# Patient Record
Sex: MALE | Race: WHITE | NOT HISPANIC OR LATINO | Employment: OTHER | URBAN - METROPOLITAN AREA
[De-identification: names, ages, dates, MRNs, and addresses within clinical notes are randomized per-mention and may not be internally consistent; named-entity substitution may affect disease eponyms.]

---

## 2017-03-17 ENCOUNTER — HOSPITAL ENCOUNTER (OUTPATIENT)
Dept: OTHER | Facility: HOSPITAL | Age: 37
Discharge: HOME | End: 2017-03-17
Attending: SURGERY | Admitting: SURGERY

## 2017-03-17 LAB
ALBUMIN SERPL-MCNC: 3.6 G/DL (ref 3.5–4.8)
ALBUMIN/GLOB SERPL: 0.8 {RATIO} (ref 1–1.7)
ALP SERPL-CCNC: 63 IU/L (ref 32–91)
ALT SERPL-CCNC: 31 IU/L (ref 17–63)
ANION GAP SERPL CALC-SCNC: 15 MMOL/L (ref 10–20)
AST SERPL-CCNC: 26 IU/L (ref 15–41)
BASOPHILS # BLD AUTO: 0.1 10*3/UL (ref 0–0.2)
BASOPHILS NFR BLD AUTO: 1 % (ref 0–2)
BILIRUB SERPL-MCNC: 0.7 MG/DL (ref 0.3–1.2)
BUN SERPL-MCNC: 12 MG/DL (ref 8–20)
BUN/CREAT SERPL: 17.1 (ref 6.2–20.3)
CALCIUM SERPL-MCNC: 8.9 MG/DL (ref 8.9–10.3)
CHLORIDE SERPL-SCNC: 104 MMOL/L (ref 101–111)
CONV CO2: 23 MMOL/L (ref 22–32)
CONV TOTAL PROTEIN: 8 G/DL (ref 6.1–7.9)
CREAT UR-MCNC: 0.7 MG/DL (ref 0.7–1.2)
DIFFERENTIAL METHOD BLD: (no result)
EOSINOPHIL # BLD AUTO: 0.2 10*3/UL (ref 0–0.3)
EOSINOPHIL # BLD AUTO: 3 % (ref 0–3)
ERYTHROCYTE [DISTWIDTH] IN BLOOD BY AUTOMATED COUNT: 14.1 % (ref 11.5–14.5)
GLOBULIN UR ELPH-MCNC: 4.4 G/DL (ref 2.5–3.8)
GLUCOSE SERPL-MCNC: 93 MG/DL (ref 65–99)
HCT VFR BLD AUTO: 43.8 % (ref 40–54)
HGB BLD-MCNC: 14.7 G/DL (ref 14–18)
LYMPHOCYTES # BLD AUTO: 2.6 10*3/UL (ref 0.8–4.8)
LYMPHOCYTES NFR BLD AUTO: 29 % (ref 18–42)
MCH RBC QN AUTO: 29.1 PG (ref 26–32)
MCHC RBC AUTO-ENTMCNC: 33.6 G/DL (ref 32–36)
MCV RBC AUTO: 86.8 FL (ref 80–94)
MONOCYTES # BLD AUTO: 0.5 10*3/UL (ref 0.1–1.3)
MONOCYTES NFR BLD AUTO: 6 % (ref 2–11)
NEUTROPHILS # BLD AUTO: 5.4 10*3/UL (ref 2.3–8.6)
NEUTROPHILS NFR BLD AUTO: 61 % (ref 50–75)
NRBC BLD AUTO-RTO: 0 /100{WBCS}
NRBC/RBC NFR BLD MANUAL: 0 10*3/UL
PLATELET # BLD AUTO: 221 10*3/UL (ref 150–450)
PMV BLD AUTO: 9.5 FL (ref 7.4–10.4)
POTASSIUM SERPL-SCNC: 4 MMOL/L (ref 3.6–5.1)
RBC # BLD AUTO: 5.05 10*6/UL (ref 4.6–6)
SODIUM SERPL-SCNC: 138 MMOL/L (ref 136–144)
WBC # BLD AUTO: 8.8 10*3/UL (ref 4.5–11.5)

## 2017-03-22 ENCOUNTER — HOSPITAL ENCOUNTER (OUTPATIENT)
Dept: PREOP | Facility: HOSPITAL | Age: 37
Setting detail: HOSPITAL OUTPATIENT SURGERY
Discharge: HOME | End: 2017-03-22
Attending: SURGERY | Admitting: SURGERY

## 2017-03-22 LAB
APTT BLD: 24.4 SEC (ref 24–31)
GLUCOSE BLD-MCNC: 100 MG/DL (ref 70–105)
GLUCOSE BLD-MCNC: 213 MG/DL (ref 70–105)
INR PPP: 0.9
PROTHROMBIN TIME: 10.8 SEC (ref 9.6–11.7)

## 2021-04-02 ENCOUNTER — TELEPHONE (OUTPATIENT)
Dept: SURGERY | Facility: CLINIC | Age: 41
End: 2021-04-02

## 2021-04-02 NOTE — TELEPHONE ENCOUNTER
Spoke with pt to see if he could bring disc of last imaging and medical records to us on Tuesday April 6,2021 for his appt.  Pt agreed to  all records needed.  I called [facility] to request those images.

## 2023-07-17 PROBLEM — D68.51 FACTOR V LEIDEN MUTATION: Status: ACTIVE | Noted: 2017-08-31

## 2023-07-17 PROBLEM — I73.9 PERIPHERAL VASCULAR DISEASE: Status: ACTIVE | Noted: 2019-07-27

## 2023-07-17 PROBLEM — J44.9 CHRONIC OBSTRUCTIVE PULMONARY DISEASE: Status: ACTIVE | Noted: 2023-07-17

## 2023-07-17 PROBLEM — E11.9 TYPE 2 DIABETES MELLITUS: Status: ACTIVE | Noted: 2019-07-27

## 2023-07-17 PROBLEM — I63.9 CEREBROVASCULAR ACCIDENT: Status: ACTIVE | Noted: 2023-07-17

## 2023-07-17 PROBLEM — G40.909 SEIZURE DISORDER: Status: ACTIVE | Noted: 2023-07-17

## 2023-07-18 PROBLEM — E66.01 MORBID OBESITY WITH BMI OF 60.0-69.9, ADULT: Status: ACTIVE | Noted: 2023-07-18

## 2023-07-18 PROBLEM — I82.409 DEEP VEIN THROMBOSIS (DVT): Status: ACTIVE | Noted: 2023-07-18

## 2023-07-18 PROBLEM — D64.9 ANEMIA: Status: ACTIVE | Noted: 2021-04-22

## 2023-07-18 PROBLEM — F41.9 ANXIETY AND DEPRESSION: Status: ACTIVE | Noted: 2023-07-18

## 2023-07-18 PROBLEM — R29.818 SUSPECTED SLEEP APNEA: Status: ACTIVE | Noted: 2023-07-18

## 2023-07-18 PROBLEM — K21.9 GERD (GASTROESOPHAGEAL REFLUX DISEASE): Status: ACTIVE | Noted: 2023-07-18

## 2023-07-18 PROBLEM — Z76.89 ENCOUNTER TO ESTABLISH CARE: Status: ACTIVE | Noted: 2023-07-18

## 2023-07-18 PROBLEM — G89.29 CHRONIC BACK PAIN: Status: ACTIVE | Noted: 2023-07-18

## 2023-07-18 PROBLEM — F32.A ANXIETY AND DEPRESSION: Status: ACTIVE | Noted: 2023-07-18

## 2023-07-18 PROBLEM — N18.2 CKD (CHRONIC KIDNEY DISEASE) STAGE 2, GFR 60-89 ML/MIN: Status: ACTIVE | Noted: 2021-10-07

## 2023-07-18 PROBLEM — G40.909 SEIZURE DISORDER: Status: RESOLVED | Noted: 2023-07-17 | Resolved: 2023-07-18

## 2023-07-18 PROBLEM — M54.9 CHRONIC BACK PAIN: Status: ACTIVE | Noted: 2023-07-18

## 2023-09-14 ENCOUNTER — TELEPHONE (OUTPATIENT)
Dept: FAMILY MEDICINE CLINIC | Facility: CLINIC | Age: 43
End: 2023-09-14

## 2023-09-14 NOTE — TELEPHONE ENCOUNTER
Caller: Farhat Hein    Relationship: Self    Best call back number: [phone]     What orders are you requesting (i.e. lab or imaging): SLEEP STUDY     In what timeframe would the patient need to come in: ASAP    Where will you receive your lab/imaging services: [facility]    Additional notes: PLEASE ADVISE

## 2023-09-21 ENCOUNTER — OFFICE VISIT (OUTPATIENT)
Dept: SLEEP MEDICINE | Facility: CLINIC | Age: 43
End: 2023-09-21
Payer: MEDICARE

## 2023-09-21 VITALS
SYSTOLIC BLOOD PRESSURE: 150 MMHG | HEIGHT: 71 IN | RESPIRATION RATE: 14 BRPM | WEIGHT: 315 LBS | HEART RATE: 75 BPM | OXYGEN SATURATION: 95 % | BODY MASS INDEX: 44.1 KG/M2 | DIASTOLIC BLOOD PRESSURE: 86 MMHG

## 2023-09-21 DIAGNOSIS — J44.9 CHRONIC OBSTRUCTIVE PULMONARY DISEASE, UNSPECIFIED COPD TYPE: ICD-10-CM

## 2023-09-21 DIAGNOSIS — E66.01 MORBID (SEVERE) OBESITY DUE TO EXCESS CALORIES: ICD-10-CM

## 2023-09-21 DIAGNOSIS — Z86.73 HISTORY OF STROKE: ICD-10-CM

## 2023-09-21 DIAGNOSIS — R06.83 SNORING: ICD-10-CM

## 2023-09-21 DIAGNOSIS — G47.10 HYPERSOMNOLENCE: Primary | ICD-10-CM

## 2023-09-21 PROCEDURE — 1160F RVW MEDS BY RX/DR IN RCRD: CPT | Performed by: NURSE PRACTITIONER

## 2023-09-21 PROCEDURE — G0463 HOSPITAL OUTPT CLINIC VISIT: HCPCS

## 2023-09-21 PROCEDURE — 99204 OFFICE O/P NEW MOD 45 MIN: CPT | Performed by: NURSE PRACTITIONER

## 2023-09-21 PROCEDURE — 1159F MED LIST DOCD IN RCRD: CPT | Performed by: NURSE PRACTITIONER

## 2023-09-21 PROCEDURE — 3077F SYST BP >= 140 MM HG: CPT | Performed by: NURSE PRACTITIONER

## 2023-09-21 PROCEDURE — 3079F DIAST BP 80-89 MM HG: CPT | Performed by: NURSE PRACTITIONER

## 2023-09-21 NOTE — PROGRESS NOTES
Expand All Collapse All      [address] [address]  Phone   Fax         Farhat Hein  [phone]   1980  43 y.o.  male        PCP:Mis Boggs APRN     Type of service: Initial New Patient Office Visit  Date of service: 9/21/2023              CHIEF COMPLAINT: Suspected sleep apnea        HISTORY OF PRESENT ILLNESS:  Farhat Hein 43 y.o.  presents to the clinic today as a new patient to me and was seen for sleep-related problems of snoring, non-restorative sleep, and witnessed apneas. The symptoms are chronic and persistent in nature.  Patient has no history of tonsillectomy, adenoidectomy, nasal surgery, UPPP.  He is going to be having gastric bypass surgery but has to be treated for sleep apnea 6 weeks prior to this.           PATIENT HISTORY:  Sleep schedule:  Bedtime: 11 PM to midnight  Wake time: 7 to 8 AM  Normally takes 1 to 2 hours to fall asleep  Average hours of sleep: 4-5  Number of naps per day: 2-3     Symptoms:  In addition to the above, patient reports:   Have you ever awakened gasping for breath, coughing, choking: Yes   Change in weight:  Yes, gained 150 pounds  Morning headaches:  Yes   Awaken with a sore throat or dry mouth:  Yes   Leg jerking at night:  Yes   Creepy crawly feeling in legs/urge to move legs:    Teeth grinding: Yes   Have you ever awakened at night with a sour taste or burning sensation in your chest:  No   Do you have muscle weakness with laughing or anger:  No   Have you ever felt paralyzed while going to sleep or waking up:  No   Sleepwalking: No   Nightmares: No   Nocturia (urination at night): 4-5 times per night  Memory Problem: No      Past medical history: (Relevant to sleep medicine)  Hypertension  COPD  Factor V Leiden  History of stroke  Peripheral neuropathy        Social history:  Do you drive a commercial vehicle:  No   Shift work:  No   Tobacco use:  No  Alcohol use:  0 per  "week  Caffeinated drinks: 2 per day  Occupation:         Family history (parents, siblings, children) (relevant to sleep medicine):  Heart disease  Hypertension  Thyroid disorder  COPD  Asthma  Diabetes  Obesity  Stroke  Sleep apnea  Cancer     Medications: reviewed      ALLERGIES: Promethazine, Calamine-zinc oxide, and Vancomycin           REVIEW OF SYSTEMS:  Full review of systems available on the intake form which is scanned in the media tab.  The relevant positives are noted below:  [place] Sleepiness Scale:   16  Fatigue  Snoring  Frequent urination  Chronic dyspnea  Heartburn        PHYSICAL EXAM:  Vitals:    09/21/23 0900   BP: 150/86   BP Location: Right arm   Patient Position: Sitting   Pulse: 75   Resp: 14   SpO2: 95%   Weight: (!) 205 kg (451 lb 12.8 oz)   Height: 180.3 cm (71\")        There is no height or weight on file to calculate BMI.    HEAD: atraumatic, normocephalic  RESPIRATORY SYSTEM: Respirations even, unlabored, normal rate  CARDIOVASULAR SYSTEM: Normal rate  EXTREMITES: No cyanosis or clubbing  NEUROLOGICAL SYSTEM: Alert and oriented x 3, no gross motor defects, gait normal     Office notes from care team reviewed. Office note(s) dated 7/18/2023, reviewed.        Labs/ Test Results Reviewed:  TSH Results:         Most Recent A1C Result:   Most Recent A1C            7/18/2023    14:03   HGBA1C Most Recent   Hemoglobin A1C 5.90                      ASSESSMENT AND PLAN:   Witnessed apnea (R06.81): patient's symptoms and physical examination are concerning for possible sleep apnea (G47.30).   I discussed the signs, symptoms, and pathophysiology of sleep apnea with this patient.  I also discussed the potential complications of untreated sleep apnea including but not limited to resistant hypertension, insulin resistance, pulmonary hypertension, atrial fibrillation, stroke, nonrestorative sleep with hypersomnia which can increase risk for motor vehicle accidents, etc.   Different testing " methods including home-based and lab based sleep studies were discussed with this patient.   Based on patient history and physical examination, will proceed with in-lab polysomnogram.  The order for the sleep study is placed in [facility].  The test will be scheduled after prior authorization has been obtained through patient's insurance.  Treatment and management will be discussed in more detail with this patient after the test is completed.  All questions were answered to patient's satisfaction.  Did discuss possible one-time low-dose Ambien night of in lab sleep study only to help aid in sleep efficiency as patient does have a lot of trouble sleeping at night.  Discussed potential risks including potential risk of fall.  Patient does not have a history of falls.  He would not take with alcohol or other sedative medications.  He would not take for home study, only in lab setting which is monitored.  He would not take before arriving or drive or operate heavy machinery on medication.  Snoring (R06.83): snoring is the sound created by turbulent airflow vibrating upper airway soft tissue.  I have also discussed factors affecting snoring including sleep deprivation, sleeping on the back and alcohol ingestion. To minimize snoring, patient is advised to have adequate sleep, sleep on their side, and avoid alcohol and sedative medications around bedtime.   Excessive daytime sleepiness: [place] Sleepiness Scale of  16.  There are many causes of excessive daytime sleepiness including but not limited to sleep apnea, shiftwork syndrome, depression, and other medical disorders such as heart disease, kidney disease, and liver failure.  The most serious cause of excessive sleepiness is due to neurological conditions such as narcolepsy/cataplexy.  More commonly excessive sleepiness is caused by sleep apnea with frequent awakenings during sleep time.  I have discussed safety of driving and to remain vigilant while driving.  Obesity: BMI  63.  Patients who are overweight or obese are at increased risk of sleep apnea/ sleep disordered breathing. Weight reduction and healthy lifestyle are encouraged in overweight/ obese patients as part of a comprehensive approach to sleep apnea treatment.    Hypertension  History of RLS  Hyperlipidemia  Factor V  History of CVA  Type 2 diabetes  CKD  Chronic back pain     I have also discussed the following:  Sleep hygiene: try to maintain a regular bed time and wake time, avoid watching TV/ using electronic devices in bed (including cell phones), limit caffeinated and alcoholic beverages before bed, try to maintain a cool and quiet sleep environment, avoid daytime naps  Adequate amount of sleep: most people need around 7 to 8 hours of sleep each night           Patient will follow-up after study, 31 to 90 days after PAP therapy initiated if applicable, or sooner for issues or concerns.  Patient's questions were answered.           Thank you for allowing me to participate in the care of this patient.     Toshia Kline DNP, APRN  [facility]

## 2023-11-03 ENCOUNTER — OFFICE VISIT (OUTPATIENT)
Dept: FAMILY MEDICINE CLINIC | Facility: CLINIC | Age: 43
End: 2023-11-03
Payer: MEDICARE

## 2023-11-03 VITALS
HEART RATE: 84 BPM | SYSTOLIC BLOOD PRESSURE: 162 MMHG | HEIGHT: 71 IN | BODY MASS INDEX: 44.1 KG/M2 | OXYGEN SATURATION: 96 % | DIASTOLIC BLOOD PRESSURE: 97 MMHG | TEMPERATURE: 99.1 F | WEIGHT: 315 LBS

## 2023-11-03 DIAGNOSIS — J02.9 SORE THROAT: ICD-10-CM

## 2023-11-03 DIAGNOSIS — R05.9 COUGH, UNSPECIFIED TYPE: ICD-10-CM

## 2023-11-03 DIAGNOSIS — R51.9 NONINTRACTABLE HEADACHE, UNSPECIFIED CHRONICITY PATTERN, UNSPECIFIED HEADACHE TYPE: Primary | ICD-10-CM

## 2023-11-03 DIAGNOSIS — S81.801A WOUND OF RIGHT LOWER EXTREMITY, INITIAL ENCOUNTER: ICD-10-CM

## 2023-11-03 DIAGNOSIS — R52 GENERALIZED BODY ACHES: ICD-10-CM

## 2023-11-03 DIAGNOSIS — E66.01 MORBID OBESITY WITH BMI OF 60.0-69.9, ADULT: ICD-10-CM

## 2023-11-03 DIAGNOSIS — R09.89 CHEST CONGESTION: ICD-10-CM

## 2023-11-03 PROBLEM — Z76.89 ENCOUNTER TO ESTABLISH CARE: Status: RESOLVED | Noted: 2023-07-18 | Resolved: 2023-11-03

## 2023-11-03 LAB
EXPIRATION DATE: NORMAL
EXPIRATION DATE: NORMAL
FLUAV AG UPPER RESP QL IA.RAPID: NOT DETECTED
FLUBV AG UPPER RESP QL IA.RAPID: NOT DETECTED
INTERNAL CONTROL: NORMAL
INTERNAL CONTROL: NORMAL
Lab: NORMAL
Lab: NORMAL
S PYO AG THROAT QL: NEGATIVE
SARS-COV-2 AG UPPER RESP QL IA.RAPID: NOT DETECTED

## 2023-11-03 RX ORDER — TORSEMIDE 100 MG/1
100 TABLET ORAL DAILY
Qty: 90 TABLET | Refills: 3 | Status: SHIPPED | OUTPATIENT
Start: 2023-11-03

## 2023-11-03 RX ORDER — AZITHROMYCIN 250 MG/1
TABLET, FILM COATED ORAL
Qty: 6 TABLET | Refills: 0 | Status: SHIPPED | OUTPATIENT
Start: 2023-11-03

## 2023-11-03 RX ORDER — PREDNISONE 10 MG/1
TABLET ORAL
Qty: 48 TABLET | Refills: 0 | Status: SHIPPED | OUTPATIENT
Start: 2023-11-03

## 2023-11-03 RX ORDER — POTASSIUM CHLORIDE 750 MG/1
10 CAPSULE, EXTENDED RELEASE ORAL DAILY
Qty: 90 CAPSULE | Refills: 3 | Status: SHIPPED | OUTPATIENT
Start: 2023-11-03

## 2023-11-03 RX ORDER — BENZONATATE 100 MG/1
100 CAPSULE ORAL 3 TIMES DAILY PRN
Qty: 30 CAPSULE | Refills: 0 | Status: SHIPPED | OUTPATIENT
Start: 2023-11-03

## 2023-11-03 NOTE — PATIENT INSTRUCTIONS
Prescribed predisone, Z-pack, and tesslon perles   Encourage to stay hydrated   Clean your eyes out

## 2023-11-03 NOTE — ASSESSMENT & PLAN NOTE
Poor circulation of RLE, two small wounds noted with serous drainage    Wound dressing changed in office- good erythema noted, small serous drainage    Patient has follow-up with wound care center

## 2023-11-03 NOTE — PROGRESS NOTES
"Chief Complaint  Chief Complaint   Patient presents with    Headache     SOS - yesterday 11-3-23; possible exposure at work     Cough    Vomiting    Generalized Body Aches    Sore Throat        Subjective          Farhat Hein is a 43-year-old male who presents to the office today with complaints of congestion.    Congestion- Started yesterday where he started to feel bad. He explains he vomited a couple of times. He has felt like he has been \"hit by a irwin truck.\" He has had fever, chills, body aches, cough non sputum productions. Sickness is going around at work. He has two employees that have COVID. He has had COVID before. He has not received his flu vaccine this year yet. He has taken some mucinex to try to help.          Review of Systems   Constitutional:  Positive for chills and fatigue.   HENT:  Positive for congestion and sore throat. Negative for ear pain.    Respiratory:  Positive for cough. Negative for shortness of breath.    Cardiovascular:  Negative for chest pain.   Gastrointestinal:  Positive for nausea and vomiting. Negative for abdominal pain.   Genitourinary:  Negative for difficulty urinating.   Musculoskeletal:  Positive for myalgias.   Skin: Negative.    Allergic/Immunologic: Positive for environmental allergies.   Neurological:  Positive for dizziness and headache. Negative for light-headedness.        Objective   Vital Signs:   Vitals:    11/03/23 1415   BP: 162/97   Pulse: 84   Temp: 99.1 °F (37.3 °C)   SpO2: 96%      Estimated body mass index is 63.04 kg/m² as calculated from the following:    Height as of this encounter: 180.3 cm (70.98\").    Weight as of this encounter: 205 kg (451 lb 12.8 oz).                  Patient screened positive for depression based on a PHQ-9 score of 11 on 7/18/2023. Follow-up recommendations include: PCP managing depression.        Physical Exam  Vitals reviewed.   Constitutional:       Appearance: He is obese. He is ill-appearing.   HENT:      Head: " Normocephalic and atraumatic.      Nose: Nose normal. Congestion present.      Mouth/Throat:      Mouth: Mucous membranes are moist.      Pharynx: Oropharynx is clear.   Eyes:      Extraocular Movements: Extraocular movements intact.      Conjunctiva/sclera: Conjunctivae normal.      Pupils: Pupils are equal, round, and reactive to light.   Cardiovascular:      Rate and Rhythm: Normal rate and regular rhythm.      Pulses: Normal pulses.      Heart sounds: Normal heart sounds.      Comments: S1, S2 audible  Pulmonary:      Effort: Pulmonary effort is normal.      Breath sounds: Normal breath sounds.      Comments: On room air - diminished breath sounds at the bases   Abdominal:      General: Abdomen is flat. Bowel sounds are normal.      Palpations: Abdomen is soft.   Musculoskeletal:         General: Normal range of motion.      Cervical back: Normal range of motion.   Skin:     General: Skin is warm and dry.      Comments: RLE wound noted, erythema noted with serous drainage noted, two small areas noted   Neurological:      General: No focal deficit present.      Mental Status: He is alert and oriented to person, place, and time. Mental status is at baseline.   Psychiatric:         Mood and Affect: Mood normal.         Behavior: Behavior normal.         Thought Content: Thought content normal.         Judgment: Judgment normal.                Physical Exam   Result Review :             Procedures       Assessment and Plan     Diagnoses and all orders for this visit:    1. Nonintractable headache, unspecified chronicity pattern, unspecified headache type (Primary)  -     POCT SARS-CoV-2 + Flu Antigen IGOR    2. Generalized body aches  -     POCT SARS-CoV-2 + Flu Antigen IGOR    3. Cough, unspecified type  -     POCT SARS-CoV-2 + Flu Antigen IGOR    4. Sore throat  -     POC Rapid Strep A    5. Chest congestion  Assessment & Plan:  Started yesterday where he started to feel bad. He explains he vomited a couple of times.  "He has felt like he has been \"hit by a mack truck.\" He has had fever, chills, body aches, cough non sputum productions. Sickness is going around at work. He has two employees that have COVID. He has had COVID before. He has not received his flu vaccine this year yet. He has taken some mucinex to try to help.     COVID/Flu negative     Prescribed predisone, Z-pack, and tesslon perles   Encourage to stay hydrated   Clean your eyes out       6. Wound of right lower extremity, initial encounter  Assessment & Plan:  Poor circulation of RLE, two small wounds noted with serous drainage    Wound dressing changed in office- good erythema noted, small serous drainage    Patient has follow-up with wound care center       7. Morbid obesity with BMI of 60.0-69.9, adult  Assessment & Plan:  Patient's (Body mass index is 63.04 kg/m².)     Referral to Bariatric surgery/nutritionist    Patient has been evaluated for bariatric surgery in the past, however his sleep apnea machine got recalled and his stars have not aligned to do so, he's been watching what he eats     Orders:  -     Ambulatory Referral to Bariatric Surgery    Other orders  -     predniSONE (DELTASONE) 10 MG (48) dose pack; Take per package instructions  Dispense: 48 tablet; Refill: 0  -     torsemide (DEMADEX) 100 MG tablet; Take 1 tablet by mouth Daily.  Dispense: 90 tablet; Refill: 3  -     potassium chloride (MICRO-K) 10 MEQ CR capsule; Take 1 capsule by mouth Daily.  Dispense: 90 capsule; Refill: 3  -     azithromycin (Zithromax Z-Vincent) 250 MG tablet; Take 2 tablets by mouth on day 1, then 1 tablet daily on days 2-5  Dispense: 6 tablet; Refill: 0  -     benzonatate (Tessalon Perles) 100 MG capsule; Take 1 capsule by mouth 3 (Three) Times a Day As Needed for Cough.  Dispense: 30 capsule; Refill: 0              Follow Up   Return for Next scheduled follow up.   Patient was given instructions and counseling regarding her condition or for health maintenance advice. " Please see specific information pulled into the AVS if appropriate.

## 2023-11-03 NOTE — ASSESSMENT & PLAN NOTE
Patient's (Body mass index is 63.04 kg/m².)     Referral to Bariatric surgery/nutritionist    Patient has been evaluated for bariatric surgery in the past, however his sleep apnea machine got recalled and his stars have not aligned to do so, he's been watching what he eats

## 2023-11-03 NOTE — LETTER
November 3, 2023     Patient: Farhat Hein   YOB: 1980   Date of Visit: 11/3/2023       To Whom It May Concern:    It is my medical opinion that Farhat Hein may return to November 7th, please excuse patient from work on 11/3, 11/4, and 11/5 due to patient having respiratory illness.         Sincerely,        SAMI Sherman    CC: No Recipients

## 2023-11-03 NOTE — ASSESSMENT & PLAN NOTE
"Started yesterday where he started to feel bad. He explains he vomited a couple of times. He has felt like he has been \"hit by a irwin truck.\" He has had fever, chills, body aches, cough non sputum productions. Sickness is going around at work. He has two employees that have COVID. He has had COVID before. He has not received his flu vaccine this year yet. He has taken some mucinex to try to help.     COVID/Flu negative     Prescribed predisone, Z-pack, and anikasjesus perles   Encourage to stay hydrated   Clean your eyes out   "

## 2023-11-09 ENCOUNTER — HOSPITAL ENCOUNTER (EMERGENCY)
Facility: HOSPITAL | Age: 43
Discharge: HOME | End: 2023-11-10
Attending: EMERGENCY MEDICINE
Payer: MEDICARE

## 2023-11-09 ENCOUNTER — APPOINTMENT (OUTPATIENT)
Dept: GENERAL RADIOLOGY | Facility: HOSPITAL | Age: 43
End: 2023-11-09
Payer: MEDICARE

## 2023-11-09 VITALS
RESPIRATION RATE: 22 BRPM | BODY MASS INDEX: 42.66 KG/M2 | WEIGHT: 315 LBS | HEIGHT: 72 IN | HEART RATE: 76 BPM | DIASTOLIC BLOOD PRESSURE: 87 MMHG | OXYGEN SATURATION: 95 % | TEMPERATURE: 98.3 F | SYSTOLIC BLOOD PRESSURE: 134 MMHG

## 2023-11-09 DIAGNOSIS — J40 BRONCHITIS: Primary | ICD-10-CM

## 2023-11-09 PROCEDURE — 99283 EMERGENCY DEPT VISIT LOW MDM: CPT

## 2023-11-09 PROCEDURE — 71045 X-RAY EXAM CHEST 1 VIEW: CPT

## 2023-11-09 RX ORDER — DOXYCYCLINE 100 MG/1
100 CAPSULE ORAL 2 TIMES DAILY
Qty: 20 CAPSULE | Refills: 0 | Status: SHIPPED | OUTPATIENT
Start: 2023-11-09 | End: 2023-11-19

## 2023-11-09 RX ORDER — ALBUTEROL SULFATE 2.5 MG/3ML
2.5 SOLUTION RESPIRATORY (INHALATION) ONCE
Status: COMPLETED | OUTPATIENT
Start: 2023-11-09 | End: 2023-11-09

## 2023-11-09 RX ORDER — DOXYCYCLINE 100 MG/1
100 CAPSULE ORAL ONCE
Status: COMPLETED | OUTPATIENT
Start: 2023-11-09 | End: 2023-11-09

## 2023-11-09 RX ADMIN — ALBUTEROL SULFATE 2.5 MG: 2.5 SOLUTION RESPIRATORY (INHALATION) at 23:24

## 2023-11-09 RX ADMIN — DOXYCYCLINE 100 MG: 100 CAPSULE ORAL at 23:24

## 2023-11-09 NOTE — Clinical Note
[facility] [place] [id number] [place] [address] [place] [address]  Phone: [phone]    Farhat Hein was seen and treated in our emergency department on 11/9/2023.  He may return to work on 11/13/2023.         Thank you for choosing [facility].    Jj Perez MD

## 2023-11-10 NOTE — FSED PROVIDER NOTE
Subjective   History of Present Illness  Patient 43-year-old man who presents complaining of persistent cough with sputum production.  Symptoms have been ongoing for approximately 1 week.  Patient states at onset of his symptoms have been seen and evaluated the following day by his primary provider.  Strep test and COVID and flu test were all negative.  Patient had presumed pneumonia and was placed on a Z-Vincent with steroids.  Patient finished his medications 1 to 2 days ago however still feels similar symptoms and is not feeling any better.  He denies any vomiting or diarrhea.  No fevers.  No extremity pain or swelling.  At times with his cough he does have difficulty breathing.      Review of Systems    Past Medical History:   Diagnosis Date    Allergic 1980    seasonal    Arthritis 01/05/2020    legs    Chronic obstructive pulmonary disease 07/17/2023    COPD (chronic obstructive pulmonary disease) 04/01/2011    GERD (gastroesophageal reflux disease) 06/06/2006    Hypertension 06/06/2006    Kidney stone 08/08/2010    Low back pain 01/01/2020    Obesity 01/21/1997    Stroke 01/21/2015 01/21/2016    Type 2 diabetes mellitus 07/27/2019    Urinary tract infection 03/15/2020       Allergies   Allergen Reactions    Promethazine Itching    Calamine-Zinc Oxide Itching and Rash    Vancomycin Rash       Past Surgical History:   Procedure Laterality Date    CHOLECYSTECTOMY  02/2016       Family History   Problem Relation Age of Onset    Arthritis Mother     COPD Mother     Diabetes Mother     Hyperlipidemia Mother     Hypertension Mother     Alcohol abuse Father     Arthritis Father     Cancer Father         SKIN    Diabetes Father     Heart disease Father     Hyperlipidemia Father     Hypertension Father     Obesity Father     Arthritis Sister     Cancer Sister         BREAST    Diabetes Sister     Hyperlipidemia Sister     Heart disease Sister     Hypertension Sister     Obesity Sister     Sleep apnea Sister      Arthritis Brother     Cancer Brother         KIDNEY/BRAIN/SKIN    Hyperlipidemia Brother     Kidney disease Brother     Hypertension Brother     Alcohol abuse Brother     Cancer Sister         COLON    Diabetes Sister             Hyperlipidemia Sister     Hypertension Sister     Diabetes Brother     Heart disease Brother     Hyperlipidemia Brother     Hypertension Brother     Alcohol abuse Brother     Cancer Maternal Grandmother     Cancer Maternal Uncle        Social History     Socioeconomic History    Marital status:    Tobacco Use    Smoking status: Former     Packs/day: 2.00     Years: 10.00     Additional pack years: 0.00     Total pack years: 20.00     Types: Cigarettes     Start date: 1998     Quit date: 2008     Years since quitting: 15.4     Passive exposure: Past    Smokeless tobacco: Never   Vaping Use    Vaping Use: Never used   Substance and Sexual Activity    Alcohol use: Not Currently     Alcohol/week: 12.0 standard drinks of alcohol     Types: 12 Cans of beer per week     Comment: PER DAY    Drug use: Yes     Frequency: 7.0 times per week     Types: Marijuana    Sexual activity: Yes     Partners: Female     Birth control/protection: Condom           Objective   Physical Exam  Vitals and nursing note reviewed.   Constitutional:       General: He is not in acute distress.     Appearance: Normal appearance. He is well-developed. He is not ill-appearing or toxic-appearing.   HENT:      Head: Normocephalic and atraumatic.      Nose: Nose normal. No rhinorrhea.      Mouth/Throat:      Mouth: Mucous membranes are moist.      Pharynx: Oropharynx is clear.   Eyes:      Extraocular Movements: Extraocular movements intact.      Pupils: Pupils are equal, round, and reactive to light.   Cardiovascular:      Rate and Rhythm: Normal rate and regular rhythm.      Pulses: Normal pulses.      Heart sounds: Normal heart sounds.   Pulmonary:      Effort: Pulmonary effort is normal. No  respiratory distress.      Breath sounds: Normal breath sounds. No stridor.   Abdominal:      Palpations: Abdomen is soft.      Tenderness: There is no abdominal tenderness.   Musculoskeletal:         General: No swelling or tenderness. Normal range of motion.      Cervical back: Normal range of motion and neck supple. No tenderness.      Right lower leg: No edema.      Left lower leg: No edema.   Skin:     General: Skin is warm and dry.      Capillary Refill: Capillary refill takes less than 2 seconds.   Neurological:      General: No focal deficit present.      Mental Status: He is alert.      Sensory: No sensory deficit.      Motor: No weakness.         Procedures           ED Course                                           Medical Decision Making  Amount and/or Complexity of Data Reviewed  Radiology: ordered.    Risk  Prescription drug management.    Patient 43-year-old male with presents complaining of cough with sputum production for approximately 1 week.  Patient had been treated with a Z-Vincent and also steroids which she completed 1 or 2 days ago however continues to have symptoms.  He denies any vomiting or diarrhea.  On examination he does have clear lungs on auscultation but room air O2 sat 95%.  Pulse within normal range.  He is afebrile.  Patient will undergo chest x-ray will be given albuterol neb treatment.  Based on the patient's recent treatment with azithromycin we will place patient on doxycycline.    Final diagnoses:   Bronchitis       ED Disposition  ED Disposition       ED Disposition   Discharge    Condition   Stable    Comment   --               Mis Boggs, APRN  [address] [facility] [address]  [place] [address]  [phone]    In 1 week           Medication List        New Prescriptions      doxycycline 100 MG capsule  Commonly known as: MONODOX  Take 1 capsule by mouth 2 (Two) Times a Day for 10 days.               Where to Get Your Medications        These medications were sent to [id number] - [place] - [address] [address] [address]  - [phone]  - [phone] FX  [address] [address], [place] [address]      Phone: [phone]   doxycycline 100 MG capsule

## 2023-11-10 NOTE — DISCHARGE INSTRUCTIONS
Please continue taking your steroids until completed.  Take doxycycline as prescribed.  Please follow-up with your provider.  Seek immediate medical attention if having worsening symptoms or any concerns.

## 2023-11-28 ENCOUNTER — OFFICE VISIT (OUTPATIENT)
Dept: FAMILY MEDICINE CLINIC | Facility: CLINIC | Age: 43
End: 2023-11-28
Payer: MEDICARE

## 2023-11-28 VITALS
DIASTOLIC BLOOD PRESSURE: 85 MMHG | BODY MASS INDEX: 42.66 KG/M2 | WEIGHT: 315 LBS | HEART RATE: 90 BPM | SYSTOLIC BLOOD PRESSURE: 125 MMHG | HEIGHT: 72 IN | TEMPERATURE: 97.5 F | OXYGEN SATURATION: 98 %

## 2023-11-28 DIAGNOSIS — S81.801A WOUND OF RIGHT LOWER EXTREMITY, INITIAL ENCOUNTER: Primary | ICD-10-CM

## 2023-11-28 DIAGNOSIS — Z00.00 ANNUAL PHYSICAL EXAM: ICD-10-CM

## 2023-11-28 PROCEDURE — 87147 CULTURE TYPE IMMUNOLOGIC: CPT | Performed by: NURSE PRACTITIONER

## 2023-11-28 PROCEDURE — 87205 SMEAR GRAM STAIN: CPT | Performed by: NURSE PRACTITIONER

## 2023-11-28 PROCEDURE — 87186 SC STD MICRODIL/AGAR DIL: CPT | Performed by: NURSE PRACTITIONER

## 2023-11-28 PROCEDURE — 87070 CULTURE OTHR SPECIMN AEROBIC: CPT | Performed by: NURSE PRACTITIONER

## 2023-11-28 PROCEDURE — 87077 CULTURE AEROBIC IDENTIFY: CPT | Performed by: NURSE PRACTITIONER

## 2023-11-28 RX ORDER — TRAMADOL HYDROCHLORIDE 50 MG/1
50 TABLET ORAL EVERY 6 HOURS PRN
Qty: 30 TABLET | Refills: 0 | Status: SHIPPED | OUTPATIENT
Start: 2023-11-28

## 2023-11-28 RX ORDER — GABAPENTIN 400 MG/1
400 CAPSULE ORAL 3 TIMES DAILY
COMMUNITY
Start: 2023-11-27

## 2023-11-28 RX ORDER — CEPHALEXIN 500 MG/1
500 CAPSULE ORAL 2 TIMES DAILY
Qty: 14 CAPSULE | Refills: 0 | Status: SHIPPED | OUTPATIENT
Start: 2023-11-28 | End: 2023-12-01

## 2023-11-28 NOTE — PATIENT INSTRUCTIONS
Follow-up with wound care center  Prescribed keflex  Wound culture ordered  Work note   ultram prescribed

## 2023-11-28 NOTE — PROGRESS NOTES
Immunization  Immunization flu performed in left arm  by Jeanna Liu MA. Patient tolerated the procedure well without complications.  11/28/23   Jeanna Liu MA          Immunization  Immunization prevnar 13performed in right arm  by Jeanna Liu MA. Patient tolerated the procedure well without complications.  11/28/23   Jeanna Liu MA

## 2023-11-28 NOTE — LETTER
November 28, 2023     Patient: Farhat Hein   YOB: 1980   Date of Visit: 11/28/2023       To Whom It May Concern:    It is my medical opinion that Farhat Hein needs to be excused for Saturday and Sunday. He will need to go part time as of the first of the year due to his chronic medical conditions.          Sincerely,        SAMI Sherman    CC: No Recipients

## 2023-11-28 NOTE — ASSESSMENT & PLAN NOTE
Wounds on right leg- Started last week. He has been putting mepilex on it and creams and it has not helped. He reports it is painful and he missed two days of work because of this. Right leg pain currently 7/10. Elevating leg helps a little. Denies aggravating factors. His leg wound is draining yellow. Denies fever. He denies any injuries. He reports the wounds come and go.     Wound culture ordered    Prescribed keflex and ultram    Follows up with wound center next week

## 2023-11-28 NOTE — PROGRESS NOTES
"Chief Complaint  Chief Complaint   Patient presents with    Wound Check     SOS - about a week ago        Subjective          Farhat Hein is a 43-year-old male who presents to the office today due to wounds on the right leg.    Wounds on right leg- Started last week. He has been putting mepilex on it and creams and it has not helped. He reports it is painful and he missed two days of work because of this. Right leg pain currently 7/10. Elevating leg helps a little. Denies aggravating factors. His leg wound is draining yellow. Denies fever. He denies any injuries. He reports the wounds come and go.     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 He wants to go part time as it is difficult for him to get around and physically demanding job. He needs a work note.          Review of Systems   Constitutional:  Negative for chills and fever.   HENT:  Negative for congestion.    Respiratory:  Negative for shortness of breath.    Cardiovascular:  Negative for chest pain.   Gastrointestinal:  Negative for abdominal pain, nausea and vomiting.   Genitourinary:  Negative for difficulty urinating.   Musculoskeletal:  Negative for myalgias.   Skin:  Positive for wound.   Neurological:  Negative for dizziness, light-headedness and headache.        Objective   Vital Signs:   Vitals:    11/28/23 1130   BP: 125/85   Pulse:    Temp:    SpO2:       Estimated body mass index is 62.51 kg/m² as calculated from the following:    Height as of this encounter: 182.9 cm (72.01\").    Weight as of this encounter: 209 kg (461 lb).                        Physical Exam  Vitals reviewed.   Constitutional:       Appearance: Normal appearance. He is obese.   HENT:      Head: Normocephalic and atraumatic.      Nose: Nose normal.      Mouth/Throat:      Mouth: Mucous membranes are moist.      Pharynx: Oropharynx is clear.   Eyes:      Extraocular Movements: Extraocular movements intact.      Conjunctiva/sclera: Conjunctivae normal. "   Cardiovascular:      Rate and Rhythm: Normal rate and regular rhythm.      Pulses: Normal pulses.      Heart sounds: Normal heart sounds.      Comments: S1, S2 audible  Pulmonary:      Effort: Pulmonary effort is normal.      Breath sounds: Normal breath sounds.      Comments: On room air   Abdominal:      General: Abdomen is flat.      Palpations: Abdomen is soft.   Musculoskeletal:         General: Normal range of motion.      Cervical back: Normal range of motion.   Skin:     General: Skin is warm and dry.      Comments: Nonhealing diabetic ulcer noted RLE with yellow drainage, wound the size of 4X4cm   Neurological:      General: No focal deficit present.      Mental Status: He is alert and oriented to person, place, and time. Mental status is at baseline.   Psychiatric:         Mood and Affect: Mood normal.         Behavior: Behavior normal.         Thought Content: Thought content normal.         Judgment: Judgment normal.                Physical Exam   Result Review :             Procedures       Assessment and Plan     Diagnoses and all orders for this visit:    1. Wound of right lower extremity, initial encounter (Primary)  Assessment & Plan:  Wounds on right leg- Started last week. He has been putting mepilex on it and creams and it has not helped. He reports it is painful and he missed two days of work because of this. Right leg pain currently 7/10. Elevating leg helps a little. Denies aggravating factors. His leg wound is draining yellow. Denies fever. He denies any injuries. He reports the wounds come and go.     Wound culture ordered    Prescribed keflex and ultram    Follows up with wound center next week     Orders:  -     traMADol (Ultram) 50 MG tablet; Take 1 tablet by mouth Every 6 (Six) Hours As Needed for Moderate Pain.  Dispense: 30 tablet; Refill: 0    2. Annual physical exam  -     pneumococcal conj. 13-valent (PREVNAR-13) vaccine 0.5 mL    Other orders  -     cephalexin (Keflex) 500 MG  capsule; Take 1 capsule by mouth 2 (Two) Times a Day.  Dispense: 14 capsule; Refill: 0  -     Fluzone (or Fluarix & Flulaval for VFC) >6mos              Follow Up   Return for Next scheduled follow up.   Patient was given instructions and counseling regarding her condition or for health maintenance advice. Please see specific information pulled into the AVS if appropriate.

## 2023-11-30 ENCOUNTER — TELEPHONE (OUTPATIENT)
Dept: FAMILY MEDICINE CLINIC | Facility: CLINIC | Age: 43
End: 2023-11-30

## 2023-11-30 NOTE — TELEPHONE ENCOUNTER
Caller: Farhat Hein    Relationship: Self    Best call back number:     [phone] (Home)       What was the call regarding: POSITIVE FOR   Streptococcus.  AND WANTED TO KNOW IF HE IS CONTAGIOUS ? CAN HE GO BACK TO WORK     Is it okay if the provider responds through MyChart: CALL OR MYCHART

## 2023-11-30 NOTE — PROGRESS NOTES
Please call patient and let him know that his wound culture did come back growing Streptococcus.  Please make sure that he is following up with the wound care center on this.  Please verify that he has made his wound care appointment.

## 2023-12-01 ENCOUNTER — TELEPHONE (OUTPATIENT)
Dept: FAMILY MEDICINE CLINIC | Facility: CLINIC | Age: 43
End: 2023-12-01
Payer: MEDICARE

## 2023-12-01 LAB
BACTERIA SPEC AEROBE CULT: ABNORMAL
BACTERIA SPEC AEROBE CULT: ABNORMAL
GRAM STN SPEC: ABNORMAL

## 2023-12-01 RX ORDER — CEFDINIR 300 MG/1
300 CAPSULE ORAL 2 TIMES DAILY
Qty: 20 CAPSULE | Refills: 0 | Status: SHIPPED | OUTPATIENT
Start: 2023-12-01

## 2023-12-01 NOTE — PROGRESS NOTES
Please call patient and let him know that I switch his antibiotic due to his wound culture coming back.  He can stop the Keflex.  I prescribed him Omnicef.

## 2023-12-01 NOTE — TELEPHONE ENCOUNTER
RELAYED THIS INFORMATION TO PATIENT. HE HAS AN APPT WITH WOUND CARE ON WED. HE WILL STOP KEFLEX AND  NEW PRESCRIPTION AS WELL.

## 2023-12-08 ENCOUNTER — TELEPHONE (OUTPATIENT)
Dept: FAMILY MEDICINE CLINIC | Facility: CLINIC | Age: 43
End: 2023-12-08
Payer: MEDICARE

## 2023-12-08 NOTE — TELEPHONE ENCOUNTER
PATIENT CALLED STATING HE STARTED HAVING A BAD COUGH 2 DAYS AFTER STARTING ANTIBIOTIC. IT IS GETTING WORSE AND MAKES IT HARD TO BREATH AND TALK. PATIENT IS ASKING IF HE SHOULD CONTINUE WITH THIS ANTIBIOTIC OR IF HE SHOULD STOP IT. HE HAS 2 DAYS LEFT OF IT. PATIENT HAS ALSO SCHEDULED AN APPT FOR MONDAY ABOUT THIS AND IS WONDERING IF HE SHOULD KEEP IT OR CANCEL. PLEASE ADVISE.

## 2023-12-08 NOTE — TELEPHONE ENCOUNTER
RELAY.    LEFT MESSAGE LETTING PATIENT KNOW CHANCE STATES HE SHOULD STILL COME IN MONDAY. THE ANTIBIOTIC SHOULD NOT CAUSE A COUGH AND HE SHOULD CONTINUE THE ANTIBIOTIC.

## 2023-12-13 ENCOUNTER — OFFICE VISIT (OUTPATIENT)
Dept: FAMILY MEDICINE CLINIC | Facility: CLINIC | Age: 43
End: 2023-12-13
Payer: MEDICARE

## 2023-12-13 VITALS
WEIGHT: 315 LBS | TEMPERATURE: 98.2 F | HEIGHT: 72 IN | BODY MASS INDEX: 42.66 KG/M2 | HEART RATE: 88 BPM | SYSTOLIC BLOOD PRESSURE: 185 MMHG | DIASTOLIC BLOOD PRESSURE: 100 MMHG | OXYGEN SATURATION: 99 %

## 2023-12-13 DIAGNOSIS — R05.9 COUGH, UNSPECIFIED TYPE: Primary | ICD-10-CM

## 2023-12-13 DIAGNOSIS — H10.31 ACUTE BACTERIAL CONJUNCTIVITIS OF RIGHT EYE: ICD-10-CM

## 2023-12-13 DIAGNOSIS — J06.9 UPPER RESPIRATORY TRACT INFECTION, UNSPECIFIED TYPE: ICD-10-CM

## 2023-12-13 DIAGNOSIS — I10 PRIMARY HYPERTENSION: ICD-10-CM

## 2023-12-13 DIAGNOSIS — R09.81 NASAL CONGESTION: ICD-10-CM

## 2023-12-13 DIAGNOSIS — R09.89 CHEST CONGESTION: ICD-10-CM

## 2023-12-13 LAB
EXPIRATION DATE: NORMAL
FLUAV AG UPPER RESP QL IA.RAPID: NOT DETECTED
FLUBV AG UPPER RESP QL IA.RAPID: NOT DETECTED
INTERNAL CONTROL: NORMAL
Lab: NORMAL
SARS-COV-2 AG UPPER RESP QL IA.RAPID: NOT DETECTED

## 2023-12-13 PROCEDURE — 3080F DIAST BP >= 90 MM HG: CPT | Performed by: NURSE PRACTITIONER

## 2023-12-13 PROCEDURE — 99214 OFFICE O/P EST MOD 30 MIN: CPT | Performed by: NURSE PRACTITIONER

## 2023-12-13 PROCEDURE — 3077F SYST BP >= 140 MM HG: CPT | Performed by: NURSE PRACTITIONER

## 2023-12-13 PROCEDURE — 87428 SARSCOV & INF VIR A&B AG IA: CPT | Performed by: NURSE PRACTITIONER

## 2023-12-13 PROCEDURE — 3044F HG A1C LEVEL LT 7.0%: CPT | Performed by: NURSE PRACTITIONER

## 2023-12-13 RX ORDER — POLYMYXIN B SULFATE AND TRIMETHOPRIM 1; 10000 MG/ML; [USP'U]/ML
1 SOLUTION OPHTHALMIC EVERY 4 HOURS
Qty: 10 ML | Refills: 0 | Status: SHIPPED | OUTPATIENT
Start: 2023-12-13

## 2023-12-13 RX ORDER — AZITHROMYCIN 500 MG/1
1000 TABLET, FILM COATED ORAL DAILY
Qty: 10 TABLET | Refills: 0 | Status: SHIPPED | OUTPATIENT
Start: 2023-12-13

## 2023-12-13 RX ORDER — HYDROCODONE POLISTIREX AND CHLORPHENIRAMINE POLISTIREX 10; 8 MG/5ML; MG/5ML
5 SUSPENSION, EXTENDED RELEASE ORAL EVERY 12 HOURS PRN
Qty: 60 ML | Refills: 0 | Status: SHIPPED | OUTPATIENT
Start: 2023-12-13

## 2023-12-13 RX ORDER — ALBUTEROL SULFATE 90 UG/1
2 AEROSOL, METERED RESPIRATORY (INHALATION) EVERY 4 HOURS PRN
Qty: 6.7 G | Refills: 1 | Status: SHIPPED | OUTPATIENT
Start: 2023-12-13

## 2023-12-13 RX ORDER — DOXYCYCLINE HYCLATE 100 MG/1
100 CAPSULE ORAL 2 TIMES DAILY
Qty: 14 CAPSULE | Refills: 0 | Status: SHIPPED | OUTPATIENT
Start: 2023-12-13

## 2023-12-13 RX ORDER — PREDNISONE 10 MG/1
TABLET ORAL
Qty: 48 TABLET | Refills: 0 | Status: SHIPPED | OUTPATIENT
Start: 2023-12-13

## 2023-12-13 NOTE — PROGRESS NOTES
"Chief Complaint  Chief Complaint   Patient presents with    Cough     Pt stated SOS about a week     URI    Conjunctivitis     SOS last night     Nasal Congestion     SOS stated about a week         Subjective          Farhat Hein is a 43 year old male who presents to the office today due to eye issues and congestion.    Eye issues/congestion-  He reports the cough started 1 week ago. He has been coughing up green sputum. Denies fever or chills. He has had a headache and body aches. He reports last night his right eye started to get drainage and itchy. His right eye is draining yellow. He reports tesslon perles do not help and he has always tried robitussin for the cough and that does not help either. He just finished an antibiotic for his leg wound.          Review of Systems   Constitutional:  Negative for chills and fever.   HENT:  Positive for congestion and sore throat.    Eyes:  Positive for discharge and itching.   Respiratory:  Positive for cough.    Cardiovascular:  Negative for chest pain.   Gastrointestinal:  Negative for abdominal pain, nausea and vomiting.   Genitourinary:  Negative for difficulty urinating.   Musculoskeletal:  Negative for myalgias.   Skin: Negative.    Neurological:  Positive for dizziness, light-headedness and headache.        Objective   Vital Signs:   Vitals:    12/13/23 1527   BP: (!) 185/100   Pulse:    Temp:    SpO2:       Estimated body mass index is 62.51 kg/m² as calculated from the following:    Height as of this encounter: 182.9 cm (72.01\").    Weight as of this encounter: 209 kg (461 lb).                          Physical Exam  Vitals reviewed.   Constitutional:       Appearance: He is obese. He is ill-appearing.   HENT:      Head: Normocephalic and atraumatic.      Ears:      Comments: Moisture noted in both ears with erythema     Nose: Congestion present.      Mouth/Throat:      Mouth: Mucous membranes are moist.      Pharynx: Oropharynx is clear.   Eyes:      General:   "       Right eye: Discharge present.      Extraocular Movements: Extraocular movements intact.      Conjunctiva/sclera: Conjunctivae normal.      Pupils: Pupils are equal, round, and reactive to light.   Cardiovascular:      Rate and Rhythm: Normal rate and regular rhythm.      Pulses: Normal pulses.      Heart sounds: Normal heart sounds.      Comments: S1, S2 audible  Pulmonary:      Effort: Pulmonary effort is normal.      Comments: On room air   Inspiratory wheezing noted upper lobes  Abdominal:      General: Abdomen is flat.      Palpations: Abdomen is soft.   Musculoskeletal:         General: Normal range of motion.      Cervical back: Normal range of motion.   Skin:     General: Skin is warm and dry.   Neurological:      General: No focal deficit present.      Mental Status: He is alert and oriented to person, place, and time. Mental status is at baseline.   Psychiatric:         Mood and Affect: Mood normal.         Behavior: Behavior normal.         Thought Content: Thought content normal.         Judgment: Judgment normal.                Physical Exam   Result Review :             Procedures       Assessment and Plan     Diagnoses and all orders for this visit:    1. Cough, unspecified type (Primary)  -     POCT SARS-CoV-2 + Flu Antigen IGOR    2. Nasal congestion  -     POCT SARS-CoV-2 + Flu Antigen IGOR    3. Chest congestion  -     POCT SARS-CoV-2 + Flu Antigen IGOR    4. Upper respiratory tract infection, unspecified type  Assessment & Plan:  Eye issues/congestion-  He reports the cough started 1 week ago. He has been coughing up green sputum. Denies fever or chills. He has had a headache and body aches. He reports last night his right eye started to get drainage and itchy. His right eye is draining yellow. He reports tesslon perles do not help and he has always tried robitussin for the cough and that does not help either. He just finished an antibiotic for his leg wound.     COVID/Flu  negative    Prescribed tussionex, steroid, azithromycin, doxycycline  Encourage to use albuterol  Encourage to stay hydration     Orders:  -     Hydrocod Edward-Chlorphe Edward ER (TUSSIONEX PENNKINETIC) 10-8 MG/5ML ER suspension; Take 5 mL by mouth Every 12 (Twelve) Hours As Needed for Cough.  Dispense: 60 mL; Refill: 0    5. Acute bacterial conjunctivitis of right eye  Assessment & Plan:  Eye issues/congestion-  He reports the cough started 1 week ago. He has been coughing up green sputum. Denies fever or chills. He has had a headache and body aches. He reports last night his right eye started to get drainage and itchy. His right eye is draining yellow. He reports tesslon perles do not help and he has always tried robitussin for the cough and that does not help either. He just finished an antibiotic for his leg wound.     Prescribed polytrim       6. Primary hypertension  Assessment & Plan:  Denies CP, SOA, lightheadedness. He has had headache and dizziness   Reports he took his medication this morning    Patient to send me BP log for next 7 days       Other orders  -     doxycycline (VIBRAMYCIN) 100 MG capsule; Take 1 capsule by mouth 2 (Two) Times a Day.  Dispense: 14 capsule; Refill: 0  -     azithromycin (Zithromax) 500 MG tablet; Take 2 tablets by mouth Daily.  Dispense: 10 tablet; Refill: 0  -     trimethoprim-polymyxin b (Polytrim) 38844-5.1 UNIT/ML-% ophthalmic solution; Administer 1 drop to both eyes Every 4 (Four) Hours.  Dispense: 10 mL; Refill: 0  -     predniSONE (DELTASONE) 10 MG (48) dose pack; Take per package instructions  Dispense: 48 tablet; Refill: 0  -     albuterol sulfate  (90 Base) MCG/ACT inhaler; Inhale 2 puffs Every 4 (Four) Hours As Needed for Wheezing or Shortness of Air.  Dispense: 6.7 g; Refill: 1              Follow Up   Return for Next scheduled follow up.   Patient was given instructions and counseling regarding her condition or for health maintenance advice. Please see  specific information pulled into the AVS if appropriate.

## 2023-12-13 NOTE — ASSESSMENT & PLAN NOTE
Eye issues/congestion-  He reports the cough started 1 week ago. He has been coughing up green sputum. Denies fever or chills. He has had a headache and body aches. He reports last night his right eye started to get drainage and itchy. His right eye is draining yellow. He reports tesslon perles do not help and he has always tried robitussin for the cough and that does not help either. He just finished an antibiotic for his leg wound.     COVID/Flu negative    Prescribed tussionex, steroid, azithromycin, doxycycline  Encourage to use albuterol  Encourage to stay hydration

## 2023-12-13 NOTE — ASSESSMENT & PLAN NOTE
Eye issues/congestion-  He reports the cough started 1 week ago. He has been coughing up green sputum. Denies fever or chills. He has had a headache and body aches. He reports last night his right eye started to get drainage and itchy. His right eye is draining yellow. He reports tesslon perles do not help and he has always tried robitussin for the cough and that does not help either. He just finished an antibiotic for his leg wound.     Prescribed polytrim

## 2023-12-13 NOTE — LETTER
December 13, 2023     Patient: Farhat Hein   YOB: 1980   Date of Visit: 12/13/2023       To Whom It May Concern:    It is my medical opinion that Farhat Hein may return to work on 12/18/2024.       Sincerely,        SAMI Sherman    CC: No Recipients

## 2023-12-13 NOTE — ASSESSMENT & PLAN NOTE
Denies CP, SOA, lightheadedness. He has had headache and dizziness   Reports he took his medication this morning    Patient to send me BP log for next 7 days

## 2023-12-19 ENCOUNTER — OFFICE VISIT (OUTPATIENT)
Dept: FAMILY MEDICINE CLINIC | Facility: CLINIC | Age: 43
End: 2023-12-19
Payer: MEDICARE

## 2023-12-19 ENCOUNTER — NURSE TRIAGE (OUTPATIENT)
Dept: CALL CENTER | Facility: HOSPITAL | Age: 43
End: 2023-12-19
Payer: MEDICARE

## 2023-12-19 VITALS
SYSTOLIC BLOOD PRESSURE: 130 MMHG | TEMPERATURE: 97.8 F | HEIGHT: 72 IN | DIASTOLIC BLOOD PRESSURE: 85 MMHG | OXYGEN SATURATION: 95 % | BODY MASS INDEX: 42.66 KG/M2 | HEART RATE: 80 BPM | WEIGHT: 315 LBS

## 2023-12-19 DIAGNOSIS — J06.9 UPPER RESPIRATORY TRACT INFECTION, UNSPECIFIED TYPE: Primary | ICD-10-CM

## 2023-12-19 PROCEDURE — 99213 OFFICE O/P EST LOW 20 MIN: CPT | Performed by: NURSE PRACTITIONER

## 2023-12-19 PROCEDURE — 1159F MED LIST DOCD IN RCRD: CPT | Performed by: NURSE PRACTITIONER

## 2023-12-19 PROCEDURE — 3079F DIAST BP 80-89 MM HG: CPT | Performed by: NURSE PRACTITIONER

## 2023-12-19 PROCEDURE — 3044F HG A1C LEVEL LT 7.0%: CPT | Performed by: NURSE PRACTITIONER

## 2023-12-19 PROCEDURE — 3075F SYST BP GE 130 - 139MM HG: CPT | Performed by: NURSE PRACTITIONER

## 2023-12-19 PROCEDURE — 1160F RVW MEDS BY RX/DR IN RCRD: CPT | Performed by: NURSE PRACTITIONER

## 2023-12-19 RX ORDER — BUDESONIDE AND FORMOTEROL FUMARATE DIHYDRATE 80; 4.5 UG/1; UG/1
2 AEROSOL RESPIRATORY (INHALATION)
Qty: 10.2 G | Refills: 11 | Status: SHIPPED | OUTPATIENT
Start: 2023-12-19

## 2023-12-19 RX ORDER — METHYLPREDNISOLONE SODIUM SUCCINATE 40 MG/ML
40 INJECTION, POWDER, LYOPHILIZED, FOR SOLUTION INTRAMUSCULAR; INTRAVENOUS ONCE
Status: COMPLETED | OUTPATIENT
Start: 2023-12-19 | End: 2023-12-19

## 2023-12-19 RX ORDER — PREDNISONE 10 MG/1
TABLET ORAL
Qty: 48 TABLET | Refills: 0 | Status: SHIPPED | OUTPATIENT
Start: 2023-12-19 | End: 2023-12-22

## 2023-12-19 RX ORDER — CEFDINIR 300 MG/1
300 CAPSULE ORAL 2 TIMES DAILY
Qty: 20 CAPSULE | Refills: 0 | Status: SHIPPED | OUTPATIENT
Start: 2023-12-19

## 2023-12-19 RX ADMIN — METHYLPREDNISOLONE SODIUM SUCCINATE 40 MG: 40 INJECTION, POWDER, LYOPHILIZED, FOR SOLUTION INTRAMUSCULAR; INTRAVENOUS at 14:34

## 2023-12-19 NOTE — TELEPHONE ENCOUNTER
"Saw PCP last week for pink eye and bronchitis. Steroids and antibiotics given. Still wheezing. Has missed 2 weeks of work. Feeling better but still coughing and wheezing. No other issues. Asked to use pulse ox to check o2 sat. Used while on phone. Reading was 4% and HR 79.     Primarily keeps him up at night with the cough.     Covid flu etc negative     Reason for Disposition   Continuous (nonstop) coughing interferes with work, school, or sleeping    Additional Information   Negative: SEVERE difficulty breathing (e.g., struggling for each breath, speaks in single words)   Negative: [1] Breathing stopped AND [2] hasn't returned   Negative: Choking on something   Negative: Bluish (or gray) lips or face now   Negative: Difficult to awaken or acting confused (e.g., disoriented, slurred speech)   Negative: Passed out (i.e., lost consciousness, collapsed and was not responding)   Negative: Wheezing started suddenly after medicine, an allergic food or bee sting   Negative: Stridor (harsh sound while breathing in)   Negative: Slow, shallow and weak breathing   Negative: Sounds like a life-threatening emergency to the triager   Negative: Chest pain   Negative: [1] Wheezing (high pitched whistling sound) AND [2] previous asthma attacks or use of asthma medicines   Negative: [1] Difficulty breathing AND [2] only present when coughing   Negative: [1] Difficulty breathing AND [2] only from stuffy or runny nose   Negative: [1] Difficulty breathing AND [2] within 14 days of COVID-19 Exposure   Negative: [1] MODERATE difficulty breathing (e.g., speaks in phrases, SOB even at rest, pulse 100-120) AND [2] NEW-onset or WORSE than normal   Negative: Oxygen level (e.g., pulse oximetry) 90 percent or lower   Negative: Wheezing can be heard across the room   Negative: Drooling or spitting out saliva (because can't swallow)   Negative: History of prior \"blood clot\" in leg or lungs (i.e., deep vein thrombosis, pulmonary embolism)   " "Negative: History of inherited increased risk of blood clots (e.g., Factor 5 Leiden, Anti-thrombin 3, Protein C or Protein S deficiency, Prothrombin mutation)   Negative: Major surgery in the past month   Negative: Hip or leg fracture (broken bone) in past month (or had cast on leg or ankle in past month)   Negative: Illness requiring prolonged bedrest in past month (e.g., immobilization, long hospital stay)   Negative: Long-distance travel in past month (e.g., car, bus, train, plane; with trip lasting 6 or more hours)   Negative: Cancer treatment in past six months (or has cancer now)   Negative: Extra heartbeats, irregular heart beating, or heart is beating very fast  (i.e., \"palpitations\")   Negative: Fever > 103 F (39.4 C)   Negative: [1] Fever > 101 F (38.3 C) AND [2] age > 60 years   Negative: [1] Fever > 100.0 F (37.8 C) AND [2] bedridden (e.g., CVA, chronic illness, recovering from surgery)   Negative: [1] Fever > 100.0 F (37.8 C) AND [2] diabetes mellitus or weak immune system (e.g., HIV positive, cancer chemo, splenectomy, organ transplant, chronic steroids)   Negative: [1] Periods where breathing stops and then resumes normally AND [2] bedridden (e.g., CVA)   Negative: Pregnant or postpartum (from 0 to 6 weeks after delivery)   Negative: Patient sounds very sick or weak to the triager   Negative: [1] MILD difficulty breathing (e.g., minimal/no SOB at rest, SOB with walking, pulse <100) AND [2] NEW-onset or WORSE than normal   Negative: [1] Longstanding difficulty breathing (e.g., CHF, COPD, emphysema) AND [2] WORSE than normal   Negative: [1] Longstanding difficulty breathing AND [2] not responding to usual therapy    Answer Assessment - Initial Assessment Questions  1. RESPIRATORY STATUS: \"Describe your breathing?\" (e.g., wheezing, shortness of breath, unable to speak, severe coughing)       Saw PCP last week for pink eye and bronchitis. Steroids and antibiotics given. Still wheezing. Has missed 2 weeks " "of work. Feeling better but still coughing and wheezing.  2. ONSET: \"When did this breathing problem begin?\"       A week or so agin   3. PATTERN \"Does the difficult breathing come and go, or has it been constant since it started?\"       Comes and goes   4. SEVERITY: \"How bad is your breathing?\" (e.g., mild, moderate, severe)     - MILD: No SOB at rest, mild SOB with walking, speaks normally in sentences, can lie down, no retractions, pulse < 100.     - MODERATE: SOB at rest, SOB with minimal exertion and prefers to sit, cannot lie down flat, speaks in phrases, mild retractions, audible wheezing, pulse 100-120.     - SEVERE: Very SOB at rest, speaks in single words, struggling to breathe, sitting hunched forward, retractions, pulse > 120       mild  5. RECURRENT SYMPTOM: \"Have you had difficulty breathing before?\" If Yes, ask: \"When was the last time?\" and \"What happened that time?\"       no  6. CARDIAC HISTORY: \"Do you have any history of heart disease?\" (e.g., heart attack, angina, bypass surgery, angioplasty)       no  7. LUNG HISTORY: \"Do you have any history of lung disease?\"  (e.g., pulmonary embolus, asthma, emphysema)      no  8. CAUSE: \"What do you think is causing the breathing problem?\"       Bronchitis   9. OTHER SYMPTOMS: \"Do you have any other symptoms? (e.g., dizziness, runny nose, cough, chest pain, fever)      no  10. O2 SATURATION MONITOR:  \"Do you use an oxygen saturation monitor (pulse oximeter) at home?\" If Yes, ask: \"What is your reading (oxygen level) today?\" \"What is your usual oxygen saturation reading?\" (e.g., 95%)        no  11. PREGNANCY: \"Is there any chance you are pregnant?\" \"When was your last menstrual period?\"        no  12. TRAVEL: \"Have you traveled out of the country in the last month?\" (e.g., travel history, exposures)        no    Protocols used: Breathing Difficulty-ADULT-AH    "

## 2023-12-19 NOTE — PROGRESS NOTES
The ABCs of the Annual Wellness Visit  Subsequent Medicare Wellness Visit    Subjective    Farhat Hein is a 43 y.o. male who presents for a Subsequent Medicare Wellness Visit.    The following portions of the patient's history were reviewed and   updated as appropriate: allergies, current medications, past family history, past medical history, past social history, past surgical history, and problem list.    Compared to one year ago, the patient feels his physical   health is better.    Compared to one year ago, the patient feels his mental   health is better.    Recent Hospitalizations:  He was admitted within the past 365 days at [facility].       Current Medical Providers:  Patient Care Team:  Mis Boggs APRN as PCP - General (Nurse Practitioner)    Outpatient Medications Prior to Visit   Medication Sig Dispense Refill    albuterol sulfate  (90 Base) MCG/ACT inhaler Inhale 2 puffs Every 4 (Four) Hours As Needed for Wheezing or Shortness of Air. 6.7 g 1    apixaban (ELIQUIS) 5 MG tablet tablet Take 1 tablet by mouth 2 (Two) Times a Day. 60 tablet 3    atorvastatin (LIPITOR) 20 MG tablet Take 0.5 tablets by mouth Daily.      budesonide-formoterol (Symbicort) 80-4.5 MCG/ACT inhaler Inhale 2 puffs 2 (Two) Times a Day. 10.2 g 11    carvedilol (COREG) 25 MG tablet Take 1 tablet by mouth 2 (Two) Times a Day With Meals.      cefdinir (OMNICEF) 300 MG capsule Take 1 capsule by mouth 2 (Two) Times a Day. 20 capsule 0    gabapentin (NEURONTIN) 400 MG capsule Take 1 capsule by mouth 3 (Three) Times a Day.      hydroCHLOROthiazide (HYDRODIURIL) 25 MG tablet Take 1 tablet by mouth Daily.      Hydrocod Edward-Chlorphe Edward ER (TUSSIONEX PENNKINETIC) 10-8 MG/5ML ER suspension Take 5 mL by mouth Every 12 (Twelve) Hours As Needed for Cough. 60 mL 0    multivitamin (THERAGRAN) tablet tablet Take  by mouth Daily. mens      NON FORMULARY Accucheck glucose strips      pantoprazole (PROTONIX) 40 MG EC tablet  Take 1 tablet by mouth.      potassium chloride (MICRO-K) 10 MEQ CR capsule Take 1 capsule by mouth Daily. 90 capsule 3    torsemide (DEMADEX) 100 MG tablet Take 1 tablet by mouth Daily. 90 tablet 3    traMADol (Ultram) 50 MG tablet Take 1 tablet by mouth Every 6 (Six) Hours As Needed for Moderate Pain. 30 tablet 0    vitamin D3 125 MCG (5000 UT) capsule capsule Take 1 capsule by mouth Daily.      predniSONE (DELTASONE) 10 MG (48) dose pack Take per package instructions 48 tablet 0    trimethoprim-polymyxin b (Polytrim) 80635-6.1 UNIT/ML-% ophthalmic solution Administer 1 drop to both eyes Every 4 (Four) Hours. 10 mL 0    azithromycin (Zithromax) 500 MG tablet Take 2 tablets by mouth Daily. (Patient not taking: Reported on 12/22/2023) 10 tablet 0    doxycycline (VIBRAMYCIN) 100 MG capsule Take 1 capsule by mouth 2 (Two) Times a Day. (Patient not taking: Reported on 12/22/2023) 14 capsule 0    predniSONE (DELTASONE) 10 MG (48) dose pack Take per package instructions (Patient not taking: Reported on 12/22/2023) 48 tablet 0     No facility-administered medications prior to visit.       Opioid medication/s are on active medication list.  and I have evaluated his active treatment plan and pain score trends (see table).  There were no vitals filed for this visit.  I have reviewed the chart for potential of high risk medication and harmful drug interactions in the elderly.          Aspirin is not on active medication list.  Aspirin use is not indicated based on review of current medical condition/s. Risk of harm outweighs potential benefits.  .    Patient Active Problem List   Diagnosis    Chronic cholecystitis    Chronic obstructive pulmonary disease    Compression fracture of lumbar vertebra    Diabetic peripheral neuropathy    Factor V Leiden mutation    Hyperlipidemia    Hypertension    Peripheral vascular disease    Restless legs syndrome    Type 2 diabetes mellitus    Anemia    Anxiety and depression    CKD (chronic  "kidney disease) stage 2, GFR 60-89 ml/min    Deep vein thrombosis (DVT)    Morbid obesity with BMI of 60.0-69.9, adult    GERD (gastroesophageal reflux disease)    Suspected sleep apnea    Chronic back pain    Wound of right leg    History of CVA (cerebrovascular accident)     Advance Care Planning   Advance Care Planning     Advance Directive is not on file.  ACP discussion was declined by the patient. Patient does not have an advance directive, declines further assistance.     Objective    Vitals:    23 0823 23 0859   BP: 148/96 (!) 172/109   Pulse: 67    Temp: 97.5 °F (36.4 °C)    TempSrc: Infrared    SpO2: 97%    Weight: (!) 215 kg (474 lb)    Height: 182.9 cm (72.01\")      Estimated body mass index is 64.27 kg/m² as calculated from the following:    Height as of this encounter: 182.9 cm (72.01\").    Weight as of this encounter: 215 kg (474 lb).           Does the patient have evidence of cognitive impairment? No          HEALTH RISK ASSESSMENT    Smoking Status:  Social History     Tobacco Use   Smoking Status Former    Packs/day: 2.00    Years: 10.00    Additional pack years: 0.00    Total pack years: 20.00    Types: Cigarettes    Start date: 1998    Quit date: 2008    Years since quitting: 15.5    Passive exposure: Past   Smokeless Tobacco Never     Alcohol Consumption:  Social History     Substance and Sexual Activity   Alcohol Use Not Currently    Alcohol/week: 12.0 standard drinks of alcohol    Types: 12 Cans of beer per week    Comment: PER DAY     Fall Risk Screen:    STEADI Fall Risk Assessment was completed, and patient is at MODERATE risk for falls. Assessment completed on:2023    Depression Screenin/22/2023     8:00 AM   PHQ-2/PHQ-9 Depression Screening   Little Interest or Pleasure in Doing Things 1-->several days   Feeling Down, Depressed or Hopeless 1-->several days   Trouble Falling or Staying Asleep, or Sleeping Too Much 3-->nearly every day   Feeling Tired " or Having Little Energy 3-->nearly every day   Poor Appetite or Overeating 0-->not at all   Feeling Bad about Yourself - or that You are a Failure or Have Let Yourself or Your Family Down 1-->several days   Trouble Concentrating on Things, Such as Reading the Newspaper or Watching Television 1-->several days   Moving or Speaking So Slowly that Other People Could Have Noticed? Or the Opposite - Being So Fidgety 0-->not at all   Thoughts that You Would be Better Off Dead or of Hurting Yourself in Some Way 0-->not at all   PHQ-9: Brief Depression Severity Measure Score 10   If You Checked Off Any Problems, How Difficult Have These Problems Made It For You to Do Your Work, Take Care of Things at Home, or Get Along with Other People? somewhat difficult       Health Habits and Functional and Cognitive Screenin/22/2023     8:00 AM   Functional & Cognitive Status   Do you have difficulty preparing food and eating? No   Do you have difficulty bathing yourself, getting dressed or grooming yourself? No   Do you have difficulty using the toilet? No   Do you have difficulty moving around from place to place? Yes   Do you have trouble with steps or getting out of a bed or a chair? No   Current Diet Well Balanced Diet   Dental Exam Up to date   Eye Exam Up to date   Exercise (times per week) 0 times per week   Current Exercises Include No Regular Exercise   Do you need help using the phone?  No   Are you deaf or do you have serious difficulty hearing?  No   Do you need help to go to places out of walking distance? No   Do you need help shopping? No   Do you need help preparing meals?  No   Do you need help with housework?  No   Do you need help with laundry? Yes   Do you need help taking your medications? No   Do you need help managing money? No   Do you ever drive or ride in a car without wearing a seat belt? Yes   Have you felt unusual stress, anger or loneliness in the last month? No   Who do you live with? Spouse    If you need help, do you have trouble finding someone available to you? No   Have you been bothered in the last four weeks by sexual problems? No   Do you have difficulty concentrating, remembering or making decisions? No       Age-appropriate Screening Schedule:  Refer to the list below for future screening recommendations based on patient's age, sex and/or medical conditions. Orders for these recommended tests are listed in the plan section. The patient has been provided with a written plan.    Health Maintenance   Topic Date Due    HEPATITIS C SCREENING  Never done    LIPID PANEL  07/17/2023    COVID-19 Vaccine (4 - 2023-24 season) 09/01/2023    HEMOGLOBIN A1C  01/18/2024    DIABETIC EYE EXAM  03/01/2024    URINE MICROALBUMIN  07/18/2024    ANNUAL WELLNESS VISIT  12/22/2024    DIABETIC FOOT EXAM  12/22/2024    BMI FOLLOWUP  12/22/2024    TDAP/TD VACCINES (2 - Td or Tdap) 04/27/2025    Pneumococcal Vaccine 0-64 (3 - PPSV23 or PCV20) 01/21/2044    INFLUENZA VACCINE  Completed    Hepatitis B  Discontinued                  CMS Preventative Services Quick Reference  Risk Factors Identified During Encounter  None Identified  The above risks/problems have been discussed with the patient.  Pertinent information has been shared with the patient in the After Visit Summary.  An After Visit Summary and PPPS were made available to the patient.    Follow Up:   Next Medicare Wellness visit to be scheduled in 1 year.       Additional E&M Note during same encounter follows:  Patient has multiple medical problems which are significant and separately identifiable that require additional work above and beyond the Medicare Wellness Visit.      Chief Complaint  Medicare Wellness-subsequent    Subjective        Farhat Hein is a 43-year-old male who presents to the office today for subsequent Medicare wellness visit.    HTN- Denies CP, SOA, Dizziness, HA, lightheadedness. He checks his BP at home and has been 150/90 is the highest it  has been. Compliant on medication.      HLD- Compliant on medication.     Factor V- Compliant eliquis. He does not see a Hematology.      History of CVA- He denies any residual affects from the stroke.      RLS- He is not currently on medication for this. Is controlled somedays. He would like to try a medication.      Type II DM- He checks his BG at home and has been 140-150's. He is not currently on any medication.He would like to try medication. He has had an eye exam this year. Due for foot exam.      CKD Stage II-  Reports he was septic and was on dialysis for 6 months, but kidneys have bounced back- happened in 2021. He does not see a kidney doctor- he was released from nephrologist.      Morbid Obesity- He is going to start exercising the first of the year. He tries to eat healthy. He is trying to have weight loss surgery- Jess in [place]- but he decided not to do. He goes to see Dr. Giraldo in January.      Insomnia- Does not sleep well at night, sleep for a couple hours and then up. He has never been tested for sleep apnea. He has been witnessed to stop breathing during the night per wife. He wants to do the at home sleep study at hospital- but he's waiting to switch insurances.      Chronic back pain- he reports he would like to come off the flexeril and do physical therapy. His current back pain is 4/10. Describes the pain as stabbing. Walking makes the pain worse. Denies any alleviating factors.         Labs- Due   PSA- Due       Vaccines:  Flu- Received   PNA- Received   Covid-19-    Dental exam-  Eye exam-      Farhat Hein is also being seen today for Subsequent medicare wellness.     Review of Systems   Constitutional:  Negative for chills and fever.   HENT:  Negative for congestion.    Eyes: Negative.    Respiratory:  Negative for shortness of breath.    Cardiovascular:  Negative for chest pain.   Gastrointestinal:  Negative for abdominal pain, nausea and vomiting.   Genitourinary:  Negative for  "dysuria.   Musculoskeletal:  Negative for myalgias.   Skin:  Positive for wound.   Neurological:  Negative for dizziness and light-headedness.   Psychiatric/Behavioral:  The patient is not nervous/anxious.        Objective   Vital Signs:  BP (!) 172/109   Pulse 67   Temp 97.5 °F (36.4 °C) (Infrared)   Ht 182.9 cm (72.01\")   Wt (!) 215 kg (474 lb)   SpO2 97%   BMI 64.27 kg/m²     Physical Exam  Vitals reviewed.   Constitutional:       Appearance: Normal appearance. He is obese.   HENT:      Head: Normocephalic and atraumatic.      Nose: Nose normal.      Mouth/Throat:      Mouth: Mucous membranes are moist.      Pharynx: Oropharynx is clear.   Eyes:      Extraocular Movements: Extraocular movements intact.      Conjunctiva/sclera: Conjunctivae normal.   Cardiovascular:      Rate and Rhythm: Normal rate and regular rhythm.      Pulses: Normal pulses.      Heart sounds: Normal heart sounds.      Comments: S1, S2 audible  Pulmonary:      Effort: Pulmonary effort is normal.      Breath sounds: Normal breath sounds.      Comments: On room air   Abdominal:      General: Abdomen is flat.      Palpations: Abdomen is soft.   Musculoskeletal:         General: Normal range of motion.      Cervical back: Normal range of motion.   Feet:      Right foot:      Skin integrity: Dry skin present.      Toenail Condition: Right toenails are abnormally thick.      Left foot:      Skin integrity: Dry skin present.      Toenail Condition: Left toenails are abnormally thick.   Skin:     General: Skin is warm and dry.   Neurological:      General: No focal deficit present.      Mental Status: He is alert and oriented to person, place, and time. Mental status is at baseline.   Psychiatric:         Mood and Affect: Mood normal.         Behavior: Behavior normal.         Thought Content: Thought content normal.         Judgment: Judgment normal.          The following data was reviewed by: SAMI Sherman on 12/22/2023:  Common " labs          7/18/2023    14:03   Common Labs   Glucose 103    BUN 15    Creatinine 0.78    Sodium 139    Potassium 4.0    Chloride 104    Calcium 9.6    Albumin 4.0    Total Bilirubin 0.5    Alkaline Phosphatase 74    AST (SGOT) 24    ALT (SGPT) 23    Hemoglobin A1C 5.90    Microalbumin, Urine 8.0                 Assessment and Plan   Diagnoses and all orders for this visit:    1. Primary hypertension (Primary)  Assessment & Plan:  HTN- Denies CP, SOA, Dizziness, HA, lightheadedness. He checks his BP at home and has been 150/90 is the highest it has been. Compliant on medication.     On HCTZ and Coreg     BP: 148/96, 172/109- He has not taken his BP med this morning, he has to take it with food        2. Hyperlipidemia, unspecified hyperlipidemia type  Assessment & Plan:  HLD- Compliant on medication.    Check lipid panel    Orders:  -     Lipid Panel    3. Factor V Leiden mutation  Assessment & Plan:  Factor V- Compliant eliquis. He does not see a Hematology.       4. History of CVA (cerebrovascular accident)  Assessment & Plan:  History of CVA- He denies any residual affects from the stroke.       5. Restless legs syndrome  Assessment & Plan:  RLS- He is not currently on medication for this. Is controlled somedays. He would like to try a medication.       6. Type 2 diabetes mellitus with hyperglycemia, unspecified whether long term insulin use  Assessment & Plan:  Type II DM- He checks his BG at home and has been 140-150's. He is not currently on any medication.He would like to try medication. He has had an eye exam this year. Due for foot exam.     Eye exam: UTD  Foot Exam: Completed    Check CMP and hbg A1C     Prescribed Ozempic     Orders:  -     Hemoglobin A1c    7. CKD (chronic kidney disease) stage 2, GFR 60-89 ml/min  Assessment & Plan:  CKD Stage II-  Reports he was septic and was on dialysis for 6 months, but kidneys have bounced back- happened in 2021. He does not see a kidney doctor- he was released  from nephrologist.     Check Cmp     Consider nephrology referral     Orders:  -     Comprehensive Metabolic Panel    8. Morbid obesity with BMI of 60.0-69.9, adult  Assessment & Plan:  Patient's (Body mass index is 64.27 kg/m².)    Morbid Obesity- He is going to start exercising the first of the year. He tries to eat healthy. He is trying to have weight loss surgery- Jess in [place]- but he decided not to do. He goes to see Dr. Giraldo in January.     Encourage healthy diet and exercise       9. Suspected sleep apnea  Assessment & Plan:  Insomnia- Does not sleep well at night, sleep for a couple hours and then up. He has never been tested for sleep apnea. He has been witnessed to stop breathing during the night per wife. He wants to do the at home sleep study at hospital- but he's waiting to switch insurances.       10. Chronic low back pain with bilateral sciatica, unspecified back pain laterality  Assessment & Plan:  Chronic back pain- he reports he would like to come off the flexeril and do physical therapy. His current back pain is 4/10. Describes the pain as stabbing. Walking makes the pain worse. Denies any alleviating factors.       11. Wound of right lower extremity, initial encounter  Assessment & Plan:  It is healed   Has referral to wound care center- he was sick when he had the appointment       12. Chronic obstructive pulmonary disease, unspecified COPD type  Assessment & Plan:  On symbicort and albuterol           13. Diabetic peripheral neuropathy  Assessment & Plan:  He denies nerve pain    He is going to taper off his gabapentin       14. Screening PSA (prostate specific antigen)    15. Need for hepatitis C screening test  -     Hepatitis C Antibody    Other orders  -     rOPINIRole (REQUIP) 0.25 MG tablet; Take 1 tablet by mouth Every Night. Take 1 hour before bedtime.  Dispense: 90 tablet; Refill: 0  -     Semaglutide,0.25 or 0.5MG/DOS, (Ozempic, 0.25 or 0.5 MG/DOSE,) 2 MG/1.5ML solution  pen-injector; Inject 0.25 mg under the skin into the appropriate area as directed 1 (One) Time Per Week.  Dispense: 1.5 mL; Refill: 0             Follow Up   Return in about 3 months (around 3/22/2024) for Diabetes , Recheck.  Patient was given instructions and counseling regarding his condition or for health maintenance advice. Please see specific information pulled into the AVS if appropriate.

## 2023-12-19 NOTE — PROGRESS NOTES
Injection  Injection performed in right ventroglueatel  by Jeanna Liu MA. Patient tolerated the procedure well without complications.  12/19/23   Jeanna Liu MA

## 2023-12-19 NOTE — LETTER
December 19, 2023     Patient: Farhat Hein   YOB: 1980   Date of Visit: 12/19/2023       To Whom It May Concern:    It is my medical opinion that Farhat Hein may return on 12/23/2023.          Sincerely,        SAMI Sherman    CC: No Recipients

## 2023-12-19 NOTE — ASSESSMENT & PLAN NOTE
Congestion-patient was just seen last week for pinkeye and congestion.  He reports he is still wheezing.  Patient was prescribed antibiotics, steroids, and cough medication at that time.  Patient reports he is still having a cough. He is coughing up greenish color. He finished his antibiotics. His pinkeye has gotten a lot better. He has not been around anyone sick. He has been checking his oxygen sats at home and have been 94%. He reports he has a couple of more doses of the predisone left.     Recent COVID/Flu negative  Reports he was diagnosed with COPD in past but never given anything for this    Prescribed symbicort  Continue albuterol  Steroid injection given in office  Prescribed omnicef and prednisone pack     Encourage if his oxygen sats do not stay above 94% he needs to go to the ER

## 2023-12-19 NOTE — PATIENT INSTRUCTIONS
Prescribed symbicort  Continue albuterol  Steroid injection given in office  Prescribed omnicef and prednisone pack      Encourage if his oxygen sats do not stay above 94% he needs to go to the ER

## 2023-12-19 NOTE — PROGRESS NOTES
"Chief Complaint  Chief Complaint   Patient presents with    Cough        Subjective          Farhat Hein is a 43-year-old male who presents to the office today on 12/19/2023 due to congestion.    Congestion-patient was just seen last week for pinkeye and congestion.  He reports he is still wheezing.  Patient was prescribed antibiotics, steroids, and cough medication at that time.  Patient reports he is still having a cough. He is coughing up greenish color. He finished his antibiotics. His pinkeye has gotten a lot better. He has not been around anyone sick. He has been checking his oxygen sats at home and have been 94%. He reports he has a couple of more doses of the predisone left.          Review of Systems   Constitutional:  Negative for chills and fever.   HENT:  Positive for congestion. Negative for ear pain and sore throat.    Respiratory:  Positive for cough and shortness of breath.    Cardiovascular:  Negative for chest pain.   Gastrointestinal:  Negative for nausea and vomiting.   Genitourinary:  Negative for difficulty urinating.   Musculoskeletal:  Positive for myalgias.   Skin: Negative.    Neurological:  Negative for dizziness, light-headedness and headache.        Objective   Vital Signs:   Vitals:    12/19/23 1343   BP: 130/85   Pulse: 80   Temp: 97.8 °F (36.6 °C)   SpO2: 95%      Estimated body mass index is 62.51 kg/m² as calculated from the following:    Height as of this encounter: 182.9 cm (72.01\").    Weight as of this encounter: 209 kg (461 lb).                          Physical Exam  Vitals reviewed.   Constitutional:       Appearance: He is obese. He is ill-appearing.   HENT:      Head: Normocephalic and atraumatic.      Nose: Nose normal.      Mouth/Throat:      Mouth: Mucous membranes are moist.      Pharynx: Oropharynx is clear.   Eyes:      Extraocular Movements: Extraocular movements intact.      Conjunctiva/sclera: Conjunctivae normal.   Cardiovascular:      Rate and Rhythm: Normal rate " and regular rhythm.      Pulses: Normal pulses.      Heart sounds: Normal heart sounds.      Comments: S1, S2 audible  Pulmonary:      Effort: Pulmonary effort is normal.      Comments: Slight expiratory wheezing with diminished breath sounds at the bases, on room air   Abdominal:      General: Abdomen is flat.      Palpations: Abdomen is soft.   Musculoskeletal:         General: Normal range of motion.      Cervical back: Normal range of motion.   Skin:     General: Skin is warm and dry.   Neurological:      General: No focal deficit present.      Mental Status: He is alert and oriented to person, place, and time. Mental status is at baseline.   Psychiatric:         Mood and Affect: Mood normal.         Behavior: Behavior normal.         Thought Content: Thought content normal.         Judgment: Judgment normal.                Physical Exam   Result Review :             Procedures       Assessment and Plan     Diagnoses and all orders for this visit:    1. Upper respiratory tract infection, unspecified type (Primary)  Assessment & Plan:  Congestion-patient was just seen last week for pinkeye and congestion.  He reports he is still wheezing.  Patient was prescribed antibiotics, steroids, and cough medication at that time.  Patient reports he is still having a cough. He is coughing up greenish color. He finished his antibiotics. His pinkeye has gotten a lot better. He has not been around anyone sick. He has been checking his oxygen sats at home and have been 94%. He reports he has a couple of more doses of the predisone left.     Recent COVID/Flu negative  Reports he was diagnosed with COPD in past but never given anything for this    Prescribed symbicort  Continue albuterol  Steroid injection given in office  Prescribed omnicef and prednisone pack     Encourage if his oxygen sats do not stay above 94% he needs to go to the ER     Orders:  -     methylPREDNISolone sodium succinate (SOLU-Medrol) injection 40  mg    Other orders  -     budesonide-formoterol (Symbicort) 80-4.5 MCG/ACT inhaler; Inhale 2 puffs 2 (Two) Times a Day.  Dispense: 10.2 g; Refill: 11  -     cefdinir (OMNICEF) 300 MG capsule; Take 1 capsule by mouth 2 (Two) Times a Day.  Dispense: 20 capsule; Refill: 0  -     predniSONE (DELTASONE) 10 MG (48) dose pack; Take per package instructions  Dispense: 48 tablet; Refill: 0              Follow Up   Return for Next scheduled follow up.   Patient was given instructions and counseling regarding her condition or for health maintenance advice. Please see specific information pulled into the AVS if appropriate.

## 2023-12-22 ENCOUNTER — OFFICE VISIT (OUTPATIENT)
Dept: FAMILY MEDICINE CLINIC | Facility: CLINIC | Age: 43
End: 2023-12-22
Payer: MEDICARE

## 2023-12-22 VITALS
HEART RATE: 67 BPM | DIASTOLIC BLOOD PRESSURE: 109 MMHG | WEIGHT: 315 LBS | TEMPERATURE: 97.5 F | HEIGHT: 72 IN | OXYGEN SATURATION: 97 % | SYSTOLIC BLOOD PRESSURE: 172 MMHG | BODY MASS INDEX: 42.66 KG/M2

## 2023-12-22 DIAGNOSIS — J44.9 CHRONIC OBSTRUCTIVE PULMONARY DISEASE, UNSPECIFIED COPD TYPE: ICD-10-CM

## 2023-12-22 DIAGNOSIS — M54.41 CHRONIC LOW BACK PAIN WITH BILATERAL SCIATICA, UNSPECIFIED BACK PAIN LATERALITY: ICD-10-CM

## 2023-12-22 DIAGNOSIS — G89.29 CHRONIC LOW BACK PAIN WITH BILATERAL SCIATICA, UNSPECIFIED BACK PAIN LATERALITY: ICD-10-CM

## 2023-12-22 DIAGNOSIS — G25.81 RESTLESS LEGS SYNDROME: ICD-10-CM

## 2023-12-22 DIAGNOSIS — E78.5 HYPERLIPIDEMIA, UNSPECIFIED HYPERLIPIDEMIA TYPE: ICD-10-CM

## 2023-12-22 DIAGNOSIS — M54.42 CHRONIC LOW BACK PAIN WITH BILATERAL SCIATICA, UNSPECIFIED BACK PAIN LATERALITY: ICD-10-CM

## 2023-12-22 DIAGNOSIS — R29.818 SUSPECTED SLEEP APNEA: ICD-10-CM

## 2023-12-22 DIAGNOSIS — E66.01 MORBID OBESITY WITH BMI OF 60.0-69.9, ADULT: ICD-10-CM

## 2023-12-22 DIAGNOSIS — Z11.59 NEED FOR HEPATITIS C SCREENING TEST: ICD-10-CM

## 2023-12-22 DIAGNOSIS — I10 PRIMARY HYPERTENSION: Primary | ICD-10-CM

## 2023-12-22 DIAGNOSIS — E11.65 TYPE 2 DIABETES MELLITUS WITH HYPERGLYCEMIA, UNSPECIFIED WHETHER LONG TERM INSULIN USE: ICD-10-CM

## 2023-12-22 DIAGNOSIS — D68.51 FACTOR V LEIDEN MUTATION: ICD-10-CM

## 2023-12-22 DIAGNOSIS — Z12.5 SCREENING PSA (PROSTATE SPECIFIC ANTIGEN): ICD-10-CM

## 2023-12-22 DIAGNOSIS — S81.801A WOUND OF RIGHT LOWER EXTREMITY, INITIAL ENCOUNTER: ICD-10-CM

## 2023-12-22 DIAGNOSIS — N18.2 CKD (CHRONIC KIDNEY DISEASE) STAGE 2, GFR 60-89 ML/MIN: ICD-10-CM

## 2023-12-22 DIAGNOSIS — Z86.73 HISTORY OF CVA (CEREBROVASCULAR ACCIDENT): ICD-10-CM

## 2023-12-22 DIAGNOSIS — E11.42 DIABETIC PERIPHERAL NEUROPATHY: ICD-10-CM

## 2023-12-22 PROBLEM — H10.31 ACUTE BACTERIAL CONJUNCTIVITIS OF RIGHT EYE: Status: RESOLVED | Noted: 2023-12-13 | Resolved: 2023-12-22

## 2023-12-22 PROBLEM — J06.9 URI (UPPER RESPIRATORY INFECTION): Status: RESOLVED | Noted: 2023-12-13 | Resolved: 2023-12-22

## 2023-12-22 PROBLEM — I63.9 CEREBROVASCULAR ACCIDENT: Status: RESOLVED | Noted: 2023-07-17 | Resolved: 2023-12-22

## 2023-12-22 PROBLEM — R09.89 CHEST CONGESTION: Status: RESOLVED | Noted: 2023-11-03 | Resolved: 2023-12-22

## 2023-12-22 LAB
ALBUMIN SERPL-MCNC: 3.8 G/DL (ref 3.5–5.2)
ALBUMIN/GLOB SERPL: 1.1 G/DL
ALP SERPL-CCNC: 65 U/L (ref 39–117)
ALT SERPL W P-5'-P-CCNC: 29 U/L (ref 1–41)
ANION GAP SERPL CALCULATED.3IONS-SCNC: 10 MMOL/L (ref 5–15)
AST SERPL-CCNC: 21 U/L (ref 1–40)
BILIRUB SERPL-MCNC: 0.5 MG/DL (ref 0–1.2)
BUN SERPL-MCNC: 26 MG/DL (ref 6–20)
BUN/CREAT SERPL: 28.6 (ref 7–25)
CALCIUM SPEC-SCNC: 8.9 MG/DL (ref 8.6–10.5)
CHLORIDE SERPL-SCNC: 101 MMOL/L (ref 98–107)
CHOLEST SERPL-MCNC: 146 MG/DL (ref 0–200)
CO2 SERPL-SCNC: 27 MMOL/L (ref 22–29)
CREAT SERPL-MCNC: 0.91 MG/DL (ref 0.76–1.27)
EGFRCR SERPLBLD CKD-EPI 2021: 106.6 ML/MIN/1.73
GLOBULIN UR ELPH-MCNC: 3.6 GM/DL
GLUCOSE SERPL-MCNC: 132 MG/DL (ref 65–99)
HBA1C MFR BLD: 6.8 % (ref 4.8–5.6)
HDLC SERPL-MCNC: 38 MG/DL (ref 40–60)
LDLC SERPL CALC-MCNC: 87 MG/DL (ref 0–100)
LDLC/HDLC SERPL: 2.23 {RATIO}
POTASSIUM SERPL-SCNC: 4 MMOL/L (ref 3.5–5.2)
PROT SERPL-MCNC: 7.4 G/DL (ref 6–8.5)
SODIUM SERPL-SCNC: 138 MMOL/L (ref 136–145)
TRIGL SERPL-MCNC: 117 MG/DL (ref 0–150)
VLDLC SERPL-MCNC: 21 MG/DL (ref 5–40)

## 2023-12-22 PROCEDURE — 86803 HEPATITIS C AB TEST: CPT | Performed by: NURSE PRACTITIONER

## 2023-12-22 PROCEDURE — 80061 LIPID PANEL: CPT | Performed by: NURSE PRACTITIONER

## 2023-12-22 PROCEDURE — 83036 HEMOGLOBIN GLYCOSYLATED A1C: CPT | Performed by: NURSE PRACTITIONER

## 2023-12-22 PROCEDURE — 80053 COMPREHEN METABOLIC PANEL: CPT | Performed by: NURSE PRACTITIONER

## 2023-12-22 RX ORDER — SEMAGLUTIDE 1.34 MG/ML
0.25 INJECTION, SOLUTION SUBCUTANEOUS WEEKLY
Qty: 1.5 ML | Refills: 0 | Status: SHIPPED | OUTPATIENT
Start: 2023-12-22

## 2023-12-22 RX ORDER — ROPINIROLE 0.25 MG/1
0.25 TABLET, FILM COATED ORAL NIGHTLY
Qty: 90 TABLET | Refills: 0 | Status: SHIPPED | OUTPATIENT
Start: 2023-12-22

## 2023-12-22 NOTE — ASSESSMENT & PLAN NOTE
Type II DM- He checks his BG at home and has been 140-150's. He is not currently on any medication.He would like to try medication. He has had an eye exam this year. Due for foot exam.     Eye exam: UTD  Foot Exam: Completed    Check CMP and hbg A1C     Prescribed Ozempic

## 2023-12-22 NOTE — ASSESSMENT & PLAN NOTE
Insomnia- Does not sleep well at night, sleep for a couple hours and then up. He has never been tested for sleep apnea. He has been witnessed to stop breathing during the night per wife. He wants to do the at home sleep study at hospital- but he's waiting to switch insurances.

## 2023-12-22 NOTE — ASSESSMENT & PLAN NOTE
RLS- He is not currently on medication for this. Is controlled somedays. He would like to try a medication.

## 2023-12-22 NOTE — ASSESSMENT & PLAN NOTE
Chronic back pain- he reports he would like to come off the flexeril and do physical therapy. His current back pain is 4/10. Describes the pain as stabbing. Walking makes the pain worse. Denies any alleviating factors.

## 2023-12-22 NOTE — ASSESSMENT & PLAN NOTE
HTN- Denies CP, SOA, Dizziness, HA, lightheadedness. He checks his BP at home and has been 150/90 is the highest it has been. Compliant on medication.     On HCTZ and Coreg     BP: 148/96, 172/109- He has not taken his BP med this morning, he has to take it with food

## 2023-12-22 NOTE — ASSESSMENT & PLAN NOTE
Patient's (Body mass index is 64.27 kg/m².)    Morbid Obesity- He is going to start exercising the first of the year. He tries to eat healthy. He is trying to have weight loss surgery- [facility] in [place]- but he decided not to do. He goes to see Dr. Giraldo in January.     Encourage healthy diet and exercise

## 2023-12-22 NOTE — PROGRESS NOTES
Venipuncture performed in R HAND by RT Luz  with good hemostasis. Patient tolerated well. 12/22/23 Claudia Jiménez, RT

## 2023-12-22 NOTE — PATIENT INSTRUCTIONS
Check labs  Encourage monthly self testicular exams   Encourage healthy diet and exercise  Prescribed requip   Let me know about sleep study   Prescribed Ozempic- once weekly injection   Encouraged to follow-up with wound care  Send me BP readings over the next 7 days

## 2023-12-23 LAB — HCV AB SER DONR QL: NORMAL

## 2023-12-26 ENCOUNTER — OFFICE VISIT (OUTPATIENT)
Dept: FAMILY MEDICINE CLINIC | Facility: CLINIC | Age: 43
End: 2023-12-26
Payer: MEDICARE

## 2023-12-26 VITALS
TEMPERATURE: 98.2 F | HEART RATE: 89 BPM | WEIGHT: 315 LBS | SYSTOLIC BLOOD PRESSURE: 156 MMHG | HEIGHT: 72 IN | BODY MASS INDEX: 42.66 KG/M2 | OXYGEN SATURATION: 98 % | DIASTOLIC BLOOD PRESSURE: 94 MMHG

## 2023-12-26 DIAGNOSIS — J06.9 UPPER RESPIRATORY TRACT INFECTION, UNSPECIFIED TYPE: ICD-10-CM

## 2023-12-26 DIAGNOSIS — J20.9 ACUTE BRONCHITIS, UNSPECIFIED ORGANISM: Primary | ICD-10-CM

## 2023-12-26 PROCEDURE — 3080F DIAST BP >= 90 MM HG: CPT | Performed by: NURSE PRACTITIONER

## 2023-12-26 PROCEDURE — 3077F SYST BP >= 140 MM HG: CPT | Performed by: NURSE PRACTITIONER

## 2023-12-26 PROCEDURE — 99214 OFFICE O/P EST MOD 30 MIN: CPT | Performed by: NURSE PRACTITIONER

## 2023-12-26 PROCEDURE — 3044F HG A1C LEVEL LT 7.0%: CPT | Performed by: NURSE PRACTITIONER

## 2023-12-26 RX ORDER — HYDROCODONE POLISTIREX AND CHLORPHENIRAMINE POLISTIREX 10; 8 MG/5ML; MG/5ML
5 SUSPENSION, EXTENDED RELEASE ORAL EVERY 12 HOURS PRN
Qty: 60 ML | Refills: 0 | Status: SHIPPED | OUTPATIENT
Start: 2023-12-26

## 2023-12-26 RX ORDER — PREDNISONE 10 MG/1
TABLET ORAL
Qty: 48 TABLET | Refills: 0 | Status: SHIPPED | OUTPATIENT
Start: 2023-12-26

## 2023-12-26 NOTE — ASSESSMENT & PLAN NOTE
Shortness of breath- He reports he was feeling better from previous illness and then woke up this morning short of breath. He used his symbicort and albuterol with no relief. He has not been around anyone sick. He is not coughing up anything. He reports he gets chest pain when he coughs. Denies fever, chills. He has had a headache and bodyache.     Undiagnosed sleep apnea likely contributing     COVID/Flu-negative    Prescribed tussionex and steroid pack     CXR ordered

## 2023-12-26 NOTE — PROGRESS NOTES
"Chief Complaint  Chief Complaint   Patient presents with    Shortness of Breath        Subjective          Farhat Hein is a 43 year old male who presents to the office today still having issues breathing.     Shortness of breath- He reports he was feeling better from previous illness and then woke up this morning short of breath. He used his symbicort and albuterol with no relief. He has not been around anyone sick. He is not coughing up anything. He reports he gets chest pain when he coughs. Denies fever, chills. He has had a headache and bodyache.              Review of Systems   Constitutional:  Negative for chills and fever.   HENT:  Positive for congestion and sore throat.    Respiratory:  Positive for cough, shortness of breath and wheezing.    Cardiovascular:  Positive for chest pain.   Gastrointestinal:  Negative for abdominal pain, nausea and vomiting.   Genitourinary:  Negative for difficulty urinating.   Musculoskeletal:  Positive for myalgias.   Skin: Negative.    Neurological:  Positive for headache.        Objective   Vital Signs:   Vitals:    12/26/23 1450   BP: 156/94   Pulse: 89   Temp: 98.2 °F (36.8 °C)   SpO2: 98%      Estimated body mass index is 64.27 kg/m² as calculated from the following:    Height as of this encounter: 182.9 cm (72.01\").    Weight as of this encounter: 215 kg (474 lb).                          Physical Exam  Vitals reviewed.   Constitutional:       Appearance: He is obese. He is ill-appearing.   HENT:      Head: Normocephalic and atraumatic.      Nose: Nose normal.      Mouth/Throat:      Mouth: Mucous membranes are moist.      Pharynx: Oropharynx is clear.   Eyes:      Extraocular Movements: Extraocular movements intact.      Conjunctiva/sclera: Conjunctivae normal.   Cardiovascular:      Rate and Rhythm: Normal rate and regular rhythm.      Pulses: Normal pulses.      Heart sounds: Normal heart sounds.      Comments: S1, S2 audible  Pulmonary:      Effort: Pulmonary effort " is normal.      Comments: On room air   Diminished breath sounds throughout all lobes  Abdominal:      General: Abdomen is flat.      Palpations: Abdomen is soft.   Musculoskeletal:         General: Normal range of motion.      Cervical back: Normal range of motion.   Skin:     General: Skin is warm and dry.   Neurological:      General: No focal deficit present.      Mental Status: He is alert and oriented to person, place, and time. Mental status is at baseline.   Psychiatric:         Mood and Affect: Mood normal.         Behavior: Behavior normal.         Thought Content: Thought content normal.         Judgment: Judgment normal.                Physical Exam   Result Review :             Procedures       Assessment and Plan     Diagnoses and all orders for this visit:    1. Acute bronchitis, unspecified organism (Primary)  Assessment & Plan:  Shortness of breath- He reports he was feeling better from previous illness and then woke up this morning short of breath. He used his symbicort and albuterol with no relief. He has not been around anyone sick. He is not coughing up anything. He reports he gets chest pain when he coughs. Denies fever, chills. He has had a headache and bodyache.     Undiagnosed sleep apnea likely contributing     COVID/Flu-negative    Prescribed tussionex and steroid pack     CXR ordered    Orders:  -     Hydrocod Edward-Chlorphe Edward ER (TUSSIONEX PENNKINETIC) 10-8 MG/5ML ER suspension; Take 5 mL by mouth Every 12 (Twelve) Hours As Needed for Cough.  Dispense: 60 mL; Refill: 0  -     XR Chest PA & Lateral (In Office)    2. Upper respiratory tract infection, unspecified type  -     Hydrocod Edward-Chlorphe Edward ER (TUSSIONEX PENNKINETIC) 10-8 MG/5ML ER suspension; Take 5 mL by mouth Every 12 (Twelve) Hours As Needed for Cough.  Dispense: 60 mL; Refill: 0    Other orders  -     predniSONE (DELTASONE) 10 MG (48) dose pack; Take per package instructions  Dispense: 48 tablet; Refill: 0               Follow Up   Return for Next scheduled follow up.   Patient was given instructions and counseling regarding her condition or for health maintenance advice. Please see specific information pulled into the AVS if appropriate.

## 2024-01-02 RX ORDER — HYDRALAZINE HYDROCHLORIDE 25 MG/1
25 TABLET, FILM COATED ORAL 3 TIMES DAILY
Qty: 90 TABLET | Refills: 0 | Status: SHIPPED | OUTPATIENT
Start: 2024-01-02

## 2024-01-24 NOTE — PROGRESS NOTES
Nutrition Services    Patient Name: Farhat Hein  YOB: 1980  MRN: [id number]  Date of Service: 01/25/24      ICD-10-CM ICD-9-CM   1. Super obese  E66.9 278.00   2. Class 3 obesity  E66.01 278.01   3. Abnormal weight gain  R63.5 783.1   4. Body mass index (BMI) 60.0-69.9, adult  Z68.44 V85.44        NUTRITION ASSESSMENT - BARIATRIC SURGERY      Reason for Visit [facility] new patient appt 1     H&P      Past Medical History:   Diagnosis Date    Allergic 1980    seasonal    Arthritis 01/05/2020    legs    Chronic obstructive pulmonary disease 07/17/2023    COPD (chronic obstructive pulmonary disease) 04/01/2011    GERD (gastroesophageal reflux disease) 06/06/2006    Hypertension 06/06/2006    Kidney stone 08/08/2010    Low back pain 01/01/2020    Obesity 01/21/1997    Pneumonia 11/2023    Renal insufficiency 03/2021    Stroke 01/21/2015 01/21/2016    Substance abuse     Type 2 diabetes mellitus 07/27/2019    Urinary tract infection 03/15/2020       Past Surgical History:   Procedure Laterality Date    CHOLECYSTECTOMY  02/2016        Previous Goals          Encounter Information        Visit Narrative     Patient reports portions tend to be issues. States he usually overeats at meals. Reviewed importance of balanced meals and having protein carb and color at all meals. Patient planning to add color to breakfast. Patient to add protein to snacks. Patient occasionally drinks big red but usually sticks to diet sodas. Encouraged patient to try eliminating carbonation or aim for 1 diet soda each day.     Diet Recall:   Breakfast: 8 pieces of pierce and 4 pieces of toast coffee with milk  Lunch: leftovers or sandwich or pizza rolls  Dinner: veggies with meat and starch (hot dogs with sauerkraut and mashed potatoes)   Snacks: chip and dip or popcorn or sweets  Beverages: tea with splenda, water (30oz of water), diet pepsi or big red 1-2x/day    Exercise: patient and wife planning to join a gym    Supplements:  "mens MV and vit D    Self Monitoring: patient may start tracking intake         Anthropometrics        Current Height, Weight Height: 178.4 cm (70.25\")  Weight: (!) 215 kg (473 lb) (01/25/24 0856)            Wt Readings from Last 30 Encounters:   01/25/24 0856 (!) 215 kg (473 lb)   12/26/23 1450 (!) 215 kg (474 lb)   12/22/23 0823 (!) 215 kg (474 lb)   12/19/23 1343 (!) 209 kg (461 lb)   12/13/23 1450 (!) 209 kg (461 lb)   11/28/23 1042 (!) 209 kg (461 lb)   11/09/23 2136 (!) 200 kg (440 lb)   11/03/23 1415 (!) 205 kg (451 lb 12.8 oz)   09/21/23 0900 (!) 205 kg (451 lb 12.8 oz)   07/18/23 1301 (!) 207 kg (457 lb)   04/03/17 1354 (!) 195 kg (430 lb 15.9 oz)   03/02/17 0722 (!) 195 kg (430 lb 15.9 oz)   11/15/16 1105 (!) 195 kg (430 lb 15.9 oz)      BMI kg/m2 Body mass index is 67.39 kg/m².       Nutrition Diagnosis         Nutrition Dx Statement Overweight/obesity RT multifactorial biochemical, behavioral and environmental contributors to disease AEB BMI 67.39 kg/m^2         Nutrition Intervention         Nutrition Intervention Nutrition education related to diet modification and physical activity        Monitor/Evaluation        New Goals Patient to add color to breakfast with fruits or vegetables  Patient to aim for 1 diet soda each day and cut out big red  Patient to add protein source with snacks  Patient to try eating more slowly to help with portions       Total time spent with pt 15 minutes of which 15 minutes were spent on education.       Electronically signed by:  Samerea Bethea RD  01/25/24 10:15 EST  "

## 2024-01-25 ENCOUNTER — OFFICE VISIT (OUTPATIENT)
Dept: BARIATRICS/WEIGHT MGMT | Facility: CLINIC | Age: 44
End: 2024-01-25
Payer: MEDICARE

## 2024-01-25 VITALS
BODY MASS INDEX: 45.1 KG/M2 | WEIGHT: 315 LBS | HEIGHT: 70 IN | SYSTOLIC BLOOD PRESSURE: 140 MMHG | DIASTOLIC BLOOD PRESSURE: 95 MMHG | OXYGEN SATURATION: 95 % | HEART RATE: 88 BPM

## 2024-01-25 DIAGNOSIS — K42.9 UMBILICAL HERNIA WITHOUT OBSTRUCTION AND WITHOUT GANGRENE: Primary | ICD-10-CM

## 2024-01-25 DIAGNOSIS — E66.01 CLASS 3 OBESITY: ICD-10-CM

## 2024-01-25 DIAGNOSIS — E66.9 SUPER OBESE: Primary | ICD-10-CM

## 2024-01-25 DIAGNOSIS — Z79.899 MEDICATION MANAGEMENT: ICD-10-CM

## 2024-01-25 DIAGNOSIS — R63.5 ABNORMAL WEIGHT GAIN: ICD-10-CM

## 2024-01-25 DIAGNOSIS — R10.33 PERIUMBILICAL ABDOMINAL PAIN: ICD-10-CM

## 2024-01-25 RX ORDER — SEMAGLUTIDE 0.68 MG/ML
0.5 INJECTION, SOLUTION SUBCUTANEOUS WEEKLY
Qty: 3 ML | Refills: 0 | Status: SHIPPED | OUTPATIENT
Start: 2024-01-25

## 2024-01-25 NOTE — PROGRESS NOTES
"[facility] BAR SURG [facility]  [address] [place] [address]  [address] [place] [address]  Dept: [phone]  1/25/2024      Farhat Hein.  [id number]  [id number]  1980  male      Chief Complaint of weight gain; unable to maintain weight loss    History of Present Illness:   Farhat is a 44 y.o. male who presents today for evaluation, education and consultation regarding medical weight management.      Diet History:See dietician/RN/MA documentation for complete history of weight and diet.          Past weight loss attempts: weight watchers, keto, high protein diet, low carb diet, fasting, calorie counting, slim fast, truv, [place],         Past Medical History:   Diagnosis Date    Allergic 1979    seasonal    Arthritis 01/05/2020    legs    Chronic obstructive pulmonary disease 07/17/2023    COPD (chronic obstructive pulmonary disease) 04/01/2011    GERD (gastroesophageal reflux disease) 06/06/2006    Hypertension 06/06/2006    Kidney stone 08/08/2010    Low back pain 01/01/2020    Obesity 01/21/1997    Pneumonia 11/2023    Renal insufficiency 03/2021    Stroke 01/21/2015 01/21/2016    Substance abuse     Type 2 diabetes mellitus 07/27/2019    Urinary tract infection 03/15/2020   GERD controlled with PPI   Dialysis for 6 months following scope a few year ago- went into septic shock?  Stroke  x 2 [id number]   DVT right leg x several   Factor 5 leiden - managed by PCP   Umbilical hernia- not repaired - having pain   Started bariatric surgery process in [facility] - per pt denied surgery due to hx of factor 5 leiden and \"too high risk for surgery\"      Past Surgical History:   Procedure Laterality Date    CHOLECYSTECTOMY  02/2016   Left knee surgery - meniscus       Allergies   Allergen Reactions    Promethazine Itching    Calamine-Zinc Oxide Itching and Rash    Vancomycin Rash         Current Outpatient " Medications:     albuterol sulfate  (90 Base) MCG/ACT inhaler, Inhale 2 puffs Every 4 (Four) Hours As Needed for Wheezing or Shortness of Air., Disp: 6.7 g, Rfl: 1    apixaban (ELIQUIS) 5 MG tablet tablet, Take 1 tablet by mouth 2 (Two) Times a Day., Disp: 60 tablet, Rfl: 3    atorvastatin (LIPITOR) 20 MG tablet, Take 0.5 tablets by mouth Daily., Disp: , Rfl:     budesonide-formoterol (Symbicort) 80-4.5 MCG/ACT inhaler, Inhale 2 puffs 2 (Two) Times a Day., Disp: 10.2 g, Rfl: 11    carvedilol (COREG) 25 MG tablet, Take 1 tablet by mouth 2 (Two) Times a Day With Meals., Disp: , Rfl:     gabapentin (NEURONTIN) 400 MG capsule, Take 1 capsule by mouth 3 (Three) Times a Day., Disp: , Rfl:     hydrALAZINE (APRESOLINE) 25 MG tablet, Take 1 tablet by mouth 3 (Three) Times a Day., Disp: 90 tablet, Rfl: 0    hydroCHLOROthiazide (HYDRODIURIL) 25 MG tablet, Take 1 tablet by mouth Daily., Disp: , Rfl:     multivitamin (THERAGRAN) tablet tablet, Take  by mouth Daily. mens, Disp: , Rfl:     NON FORMULARY, Accucheck glucose strips, Disp: , Rfl:     pantoprazole (PROTONIX) 40 MG EC tablet, Take 1 tablet by mouth., Disp: , Rfl:     potassium chloride (MICRO-K) 10 MEQ CR capsule, Take 1 capsule by mouth Daily., Disp: 90 capsule, Rfl: 3    rOPINIRole (REQUIP) 0.25 MG tablet, Take 1 tablet by mouth Every Night. Take 1 hour before bedtime., Disp: 90 tablet, Rfl: 0    Semaglutide,0.25 or 0.5MG/DOS, (Ozempic, 0.25 or 0.5 MG/DOSE,) 2 MG/1.5ML solution pen-injector, Inject 0.25 mg under the skin into the appropriate area as directed 1 (One) Time Per Week., Disp: 1.5 mL, Rfl: 0    torsemide (DEMADEX) 100 MG tablet, Take 1 tablet by mouth Daily., Disp: 90 tablet, Rfl: 3    traMADol (Ultram) 50 MG tablet, Take 1 tablet by mouth Every 6 (Six) Hours As Needed for Moderate Pain., Disp: 30 tablet, Rfl: 0    vitamin D3 125 MCG (5000 UT) capsule capsule, Take 1 capsule by mouth Daily., Disp: , Rfl:     cefdinir (OMNICEF) 300 MG capsule, Take 1  capsule by mouth 2 (Two) Times a Day. (Patient not taking: Reported on 2023), Disp: 20 capsule, Rfl: 0    Hydrocod Edward-Chlorphe Edward ER (TUSSIONEX PENNKINETIC) 10-8 MG/5ML ER suspension, Take 5 mL by mouth Every 12 (Twelve) Hours As Needed for Cough., Disp: 60 mL, Rfl: 0    predniSONE (DELTASONE) 10 MG (48) dose pack, Take per package instructions, Disp: 48 tablet, Rfl: 0    Social History     Socioeconomic History    Marital status:    Tobacco Use    Smoking status: Former     Packs/day: 2.00     Years: 10.00     Additional pack years: 0.00     Total pack years: 20.00     Types: Cigarettes     Start date: 1998     Quit date: 2008     Years since quitting: 15.6     Passive exposure: Past    Smokeless tobacco: Never   Vaping Use    Vaping Use: Never used   Substance and Sexual Activity    Alcohol use: Not Currently     Alcohol/week: 12.0 standard drinks of alcohol     Types: 12 Cans of beer per week     Comment: PER DAY    Drug use: Yes     Frequency: 7.0 times per week     Types: Marijuana    Sexual activity: Yes     Partners: Female     Birth control/protection: Condom       Family History   Problem Relation Age of Onset    Arthritis Mother     COPD Mother     Diabetes Mother     Hyperlipidemia Mother     Hypertension Mother     Alcohol abuse Father     Arthritis Father     Cancer Father         SKIN    Diabetes Father     Heart disease Father     Hyperlipidemia Father     Hypertension Father     Obesity Father     Arthritis Sister     Cancer Sister         BREAST    Diabetes Sister     Hyperlipidemia Sister     Heart disease Sister     Hypertension Sister     Obesity Sister     Sleep apnea Sister     Arthritis Brother     Cancer Brother         KIDNEY/BRAIN/SKIN    Hyperlipidemia Brother     Kidney disease Brother     Hypertension Brother     Alcohol abuse Brother     Mental illness Brother         schizophrenia    Cancer Sister         COLON    Diabetes Sister              "Hyperlipidemia Sister     Hypertension Sister     Diabetes Brother     Heart disease Brother     Hyperlipidemia Brother     Hypertension Brother     Alcohol abuse Brother     Cancer Maternal Grandmother     Cancer Maternal Uncle          Review of Systems:  Review of Systems   Constitutional:  Positive for fatigue and unexpected weight change.   Respiratory:  Positive for shortness of breath.         Sleep study Mon 1/26/24- apnea at night, COPD- not controlled per pt   Cardiovascular:  Positive for leg swelling.        Stroke x 2   Gastrointestinal:  Positive for abdominal pain.        Gerd, \" Bulge at umbilicus\"   Endocrine:        DMII   Genitourinary:         On dialysis for 6 months a few years ago after septic shock- per pt?   Musculoskeletal:  Positive for arthralgias, back pain, gait problem and myalgias.   Neurological:         RLS   Hematological:         Factor 5 leiden- managed by pcp, DVT leg       Physical Exam:  Vital Signs:  Weight: (!) 215 kg (473 lb)   Body mass index is 67.39 kg/m².      Heart Rate: 88   BP: 140/95     Physical Exam  Constitutional:       Appearance: Normal appearance. He is obese.   Cardiovascular:      Rate and Rhythm: Normal rate.   Pulmonary:      Effort: Pulmonary effort is normal.      Breath sounds: Normal breath sounds.   Abdominal:      General: Abdomen is flat.      Palpations: Abdomen is soft.   Skin:     General: Skin is warm and dry.   Neurological:      General: No focal deficit present.      Mental Status: He is alert and oriented to person, place, and time.   Psychiatric:         Mood and Affect: Mood normal.         Behavior: Behavior normal.         Thought Content: Thought content normal.         Judgment: Judgment normal.            Assessment:         Farhat Hein is a 44 y.o. year old male with medically complicated severe obesity. Weight: (!) 215 kg (473 lb), Body mass index is 67.39 kg/m². and weight related problems.    CBC,  TSH, Vitamin D  will be drawn. " ( Other labs reviewed from PCP orders)      The patient was advised to start a high protein, low fat and low carbohydrate diet. The patient was given individualized information  along with general group information and handouts.     The patient was encouraged to start routine exercise including but not limited to 150 minutes per week.     The consultation plan was reviewed with the patient.        I think he is a good candidate for medical weight management.Pt was already started on ozempic by his pcp for his DMII. He has done 3 weeks of the 0.25 mg dosage. Pt would like to transfer his care regarding this medication to this office. Will refill at the 0.5 mg weekly dosage. Pt has not had any nausea / vomiting or abdominal pain with ozempic so far. I did alsp speak with the pt about possibly trying mounjaro at some point if needed. Pt did state he started the bariatric process in [place] , [place] but dropped out after the surgeon would not do surgery on him with his factor 5 leiden dx. I spoke with the pt and informed him that this office has done surgery on pt's with this history but pt would like to proceed with non surgical options first. Pt denies hx of medullary thyroid cancer for himself or his immediate family. Pt also denies hx of pancreatitis or hx of multiple endocrine neoplasia type 2. Pt to call office with any nausea, vomiting or abdominal pain. If pt decides to pursue surgery at some point, will need cardiac , pulmonary ( due to uncontrolled copd), and hematology clearances.     Pt is also having pain at him umbilicus that is worse with palpation. Pt did have a noticeable bulge at this area and umbilical hernia suspected. PT states the bariatric surgeon in [place] was going to repair his hernia at the time of his bariatric surgery. Pt reports no imaging done before. I explained to the pt that typically these are not repaired at the time of bariatric surgery due to chance of needing a new repair with rapid  weight loss but since pt was having acute pain, would order CT scan and depending on results send a referral to DR. Hernandez for consult. Pt was informed of symptoms to watch out for to proceed to ER for possible incarceration of the hernia.     Encounter Diagnosis   Name Primary?    Super obese Yes       Plan:    BMI 67. 39, class 3 obesity, medication management: ozempic   Increase ozempic to 0.5 mg weekly  Labs - cbc, tsh, vitamin d  Ct scan to evaluate umbilical hernia     Patient will have evaluations and follow up with bariatric dietician before undergoing a multidisciplinary review of their candidacy.  We also discussed other program requirements.      Total time spent with pt 60 minutes of which 45 minutes were spent on education.     Penny Rueda, APRN  1/25/2024

## 2024-01-29 ENCOUNTER — OFFICE VISIT (OUTPATIENT)
Dept: FAMILY MEDICINE CLINIC | Facility: CLINIC | Age: 44
End: 2024-01-29
Payer: MEDICARE

## 2024-01-29 VITALS
OXYGEN SATURATION: 96 % | WEIGHT: 315 LBS | DIASTOLIC BLOOD PRESSURE: 80 MMHG | BODY MASS INDEX: 45.1 KG/M2 | SYSTOLIC BLOOD PRESSURE: 130 MMHG | TEMPERATURE: 98.2 F | HEART RATE: 85 BPM | HEIGHT: 70 IN

## 2024-01-29 DIAGNOSIS — I87.2 CHRONIC VENOUS INSUFFICIENCY OF LOWER EXTREMITY: ICD-10-CM

## 2024-01-29 DIAGNOSIS — S81.801A WOUND OF RIGHT LOWER EXTREMITY, INITIAL ENCOUNTER: ICD-10-CM

## 2024-01-29 DIAGNOSIS — E66.01 MORBID OBESITY WITH BMI OF 60.0-69.9, ADULT: ICD-10-CM

## 2024-01-29 DIAGNOSIS — F32.A ANXIETY AND DEPRESSION: Primary | ICD-10-CM

## 2024-01-29 DIAGNOSIS — F41.9 ANXIETY AND DEPRESSION: Primary | ICD-10-CM

## 2024-01-29 DIAGNOSIS — I10 ESSENTIAL (PRIMARY) HYPERTENSION: ICD-10-CM

## 2024-01-29 LAB
BASOPHILS # BLD AUTO: 0.06 10*3/MM3 (ref 0–0.2)
BASOPHILS NFR BLD AUTO: 0.7 % (ref 0–1.5)
DEPRECATED RDW RBC AUTO: 46.5 FL (ref 37–54)
EOSINOPHIL # BLD AUTO: 0.2 10*3/MM3 (ref 0–0.4)
EOSINOPHIL NFR BLD AUTO: 2.5 % (ref 0.3–6.2)
ERYTHROCYTE [DISTWIDTH] IN BLOOD BY AUTOMATED COUNT: 14.6 % (ref 12.3–15.4)
HCT VFR BLD AUTO: 41.9 % (ref 37.5–51)
HGB BLD-MCNC: 14.1 G/DL (ref 13–17.7)
IMM GRANULOCYTES # BLD AUTO: 0.02 10*3/MM3 (ref 0–0.05)
IMM GRANULOCYTES NFR BLD AUTO: 0.2 % (ref 0–0.5)
LYMPHOCYTES # BLD AUTO: 2.26 10*3/MM3 (ref 0.7–3.1)
LYMPHOCYTES NFR BLD AUTO: 27.9 % (ref 19.6–45.3)
MCH RBC QN AUTO: 29.7 PG (ref 26.6–33)
MCHC RBC AUTO-ENTMCNC: 33.7 G/DL (ref 31.5–35.7)
MCV RBC AUTO: 88.4 FL (ref 79–97)
MONOCYTES # BLD AUTO: 0.61 10*3/MM3 (ref 0.1–0.9)
MONOCYTES NFR BLD AUTO: 7.5 % (ref 5–12)
NEUTROPHILS NFR BLD AUTO: 4.94 10*3/MM3 (ref 1.7–7)
NEUTROPHILS NFR BLD AUTO: 61.2 % (ref 42.7–76)
NRBC BLD AUTO-RTO: 0 /100 WBC (ref 0–0.2)
PLATELET # BLD AUTO: 235 10*3/MM3 (ref 140–450)
PMV BLD AUTO: 11.1 FL (ref 6–12)
RBC # BLD AUTO: 4.74 10*6/MM3 (ref 4.14–5.8)
T4 FREE SERPL-MCNC: 1.45 NG/DL (ref 0.93–1.7)
TSH SERPL DL<=0.05 MIU/L-ACNC: 0.49 UIU/ML (ref 0.27–4.2)
WBC NRBC COR # BLD AUTO: 8.09 10*3/MM3 (ref 3.4–10.8)

## 2024-01-29 PROCEDURE — 3077F SYST BP >= 140 MM HG: CPT | Performed by: NURSE PRACTITIONER

## 2024-01-29 PROCEDURE — 84439 ASSAY OF FREE THYROXINE: CPT | Performed by: NURSE PRACTITIONER

## 2024-01-29 PROCEDURE — 99214 OFFICE O/P EST MOD 30 MIN: CPT | Performed by: NURSE PRACTITIONER

## 2024-01-29 PROCEDURE — 82306 VITAMIN D 25 HYDROXY: CPT | Performed by: NURSE PRACTITIONER

## 2024-01-29 PROCEDURE — 87070 CULTURE OTHR SPECIMN AEROBIC: CPT | Performed by: NURSE PRACTITIONER

## 2024-01-29 PROCEDURE — 84443 ASSAY THYROID STIM HORMONE: CPT | Performed by: NURSE PRACTITIONER

## 2024-01-29 PROCEDURE — 85025 COMPLETE CBC W/AUTO DIFF WBC: CPT | Performed by: NURSE PRACTITIONER

## 2024-01-29 PROCEDURE — 87186 SC STD MICRODIL/AGAR DIL: CPT | Performed by: NURSE PRACTITIONER

## 2024-01-29 PROCEDURE — 3079F DIAST BP 80-89 MM HG: CPT | Performed by: NURSE PRACTITIONER

## 2024-01-29 PROCEDURE — 87205 SMEAR GRAM STAIN: CPT | Performed by: NURSE PRACTITIONER

## 2024-01-29 RX ORDER — ALBUTEROL SULFATE 90 UG/1
2 AEROSOL, METERED RESPIRATORY (INHALATION) EVERY 4 HOURS PRN
Qty: 6.7 G | Refills: 1 | Status: SHIPPED | OUTPATIENT
Start: 2024-01-29

## 2024-01-29 RX ORDER — SERTRALINE HYDROCHLORIDE 25 MG/1
25 TABLET, FILM COATED ORAL DAILY
Qty: 90 TABLET | Refills: 0 | Status: SHIPPED | OUTPATIENT
Start: 2024-01-29

## 2024-01-29 RX ORDER — CLINDAMYCIN HYDROCHLORIDE 300 MG/1
300 CAPSULE ORAL 3 TIMES DAILY
Qty: 30 CAPSULE | Refills: 0 | Status: SHIPPED | OUTPATIENT
Start: 2024-01-29

## 2024-01-29 RX ORDER — HYDRALAZINE HYDROCHLORIDE 25 MG/1
25 TABLET, FILM COATED ORAL 3 TIMES DAILY
Qty: 90 TABLET | Refills: 0 | Status: SHIPPED | OUTPATIENT
Start: 2024-01-29

## 2024-01-29 NOTE — ASSESSMENT & PLAN NOTE
Depression- He is depressed. Denies SI or HI. He has never been on medication. He has never seen a counselor.     Prescribed zoloft- Takes 4-6 weeks to get in system.   Encourage counseling and exercises

## 2024-01-29 NOTE — PATIENT INSTRUCTIONS
Prescribed zoloft- take at night  Encourage exercise and counseling   Prescribed clindamycin for lower extremity wound

## 2024-01-29 NOTE — PROGRESS NOTES
Venipuncture performed in right hand by Jeanna Liu MA with good hemostasis. Patient tolerated well. Jeanna Liu MA 04/14/23

## 2024-01-29 NOTE — PROGRESS NOTES
"Chief Complaint  Chief Complaint   Patient presents with    Cellulitis        Subjective          Farhat Hein is a 44-year-old male who presents to the office today due to right leg cellulitis.       Right leg cellulitis-  He has right leg wound for a while and it comes and goes. Winter and summer it gets worse. He is having drainage. He was previously on antibiotic before for this. He has not felt very good. He has not had a fever.     Depression- He is depressed. Denies SI or HI. He has never been on medication. He has never seen a counselor.          Review of Systems   Constitutional:  Negative for chills and fever.   HENT:  Negative for congestion.    Respiratory:  Negative for shortness of breath.    Cardiovascular:  Negative for chest pain.   Gastrointestinal:  Negative for abdominal pain, nausea and vomiting.   Genitourinary:  Negative for difficulty urinating.   Musculoskeletal:  Negative for myalgias.   Skin:  Positive for wound.   Neurological:  Negative for dizziness, light-headedness and headache.   Psychiatric/Behavioral:  Positive for depressed mood. Negative for self-injury and suicidal ideas. The patient is nervous/anxious.         Objective   Vital Signs:   Vitals:    01/29/24 1014   BP: 140/88   Pulse: 85   Temp: 98.2 °F (36.8 °C)   SpO2: 96%      Estimated body mass index is 67.84 kg/m² as calculated from the following:    Height as of this encounter: 178.4 cm (70.24\").    Weight as of this encounter: 216 kg (476 lb).                  Patient screened positive for depression based on a PHQ-9 score of 10 on 12/22/2023. Follow-up recommendations include: PCP managing depression.        Physical Exam  Vitals reviewed.   Constitutional:       Appearance: Normal appearance. He is obese.   HENT:      Head: Normocephalic and atraumatic.      Nose: Nose normal.      Mouth/Throat:      Mouth: Mucous membranes are moist.      Pharynx: Oropharynx is clear.   Eyes:      Extraocular Movements: Extraocular " movements intact.      Conjunctiva/sclera: Conjunctivae normal.   Cardiovascular:      Rate and Rhythm: Normal rate and regular rhythm.      Pulses: Normal pulses.      Heart sounds: Normal heart sounds.      Comments: S1, S2 audible  Pulmonary:      Effort: Pulmonary effort is normal.      Breath sounds: Normal breath sounds.      Comments: On room air   Abdominal:      General: Abdomen is flat.      Palpations: Abdomen is soft.   Musculoskeletal:         General: Normal range of motion.      Cervical back: Normal range of motion.   Skin:     General: Skin is warm.      Comments: Right leg wound notes, open, serous drainage    Neurological:      General: No focal deficit present.      Mental Status: He is alert and oriented to person, place, and time. Mental status is at baseline.   Psychiatric:         Mood and Affect: Mood normal.         Behavior: Behavior normal.         Thought Content: Thought content normal.         Judgment: Judgment normal.                Physical Exam   Result Review :             Procedures       Assessment and Plan     Diagnoses and all orders for this visit:    1. Anxiety and depression (Primary)  Assessment & Plan:  Depression- He is depressed. Denies SI or HI. He has never been on medication. He has never seen a counselor.     Prescribed zoloft- Takes 4-6 weeks to get in system.   Encourage counseling and exercises       2. Wound of right lower extremity, initial encounter  Assessment & Plan:  Right leg cellulitis-  He has right leg wound for a while and it comes and goes. Winter and summer it gets worse. He is having drainage. He was previously on antibiotic before for this. He has not felt very good. He has not had a fever.     Wound culture obtained    Serous drainage noted on dressing  Wound culture ordered  Prescribed clindamycin     Orders:  -     Wound Culture - Wound, Leg, Right  -     CBC & Differential    3. Chronic venous insufficiency of lower extremity    4. Morbid  obesity with BMI of 60.0-69.9, adult  -     TSH+Free T4  -     Vitamin D,25-Hydroxy    5. Essential (primary) hypertension  -     TSH+Free T4    Other orders  -     sertraline (Zoloft) 25 MG tablet; Take 1 tablet by mouth Daily.  Dispense: 90 tablet; Refill: 0  -     clindamycin (Cleocin) 300 MG capsule; Take 1 capsule by mouth 3 (Three) Times a Day.  Dispense: 30 capsule; Refill: 0              Follow Up   Return for Depression , Next scheduled follow up.   Patient was given instructions and counseling regarding her condition or for health maintenance advice. Please see specific information pulled into the AVS if appropriate.

## 2024-01-29 NOTE — ASSESSMENT & PLAN NOTE
Right leg cellulitis-  He has right leg wound for a while and it comes and goes. Winter and summer it gets worse. He is having drainage. He was previously on antibiotic before for this. He has not felt very good. He has not had a fever.     Wound culture obtained    Serous drainage noted on dressing  Wound culture ordered  Prescribed clindamycin

## 2024-01-30 ENCOUNTER — TELEPHONE (OUTPATIENT)
Dept: FAMILY MEDICINE CLINIC | Facility: CLINIC | Age: 44
End: 2024-01-30
Payer: MEDICARE

## 2024-01-30 LAB — 25(OH)D3 SERPL-MCNC: 33.6 NG/ML (ref 30–100)

## 2024-01-30 NOTE — TELEPHONE ENCOUNTER
[facility] CALLED ASKING FOR US TO FAX OVER WOUND CULTURE RESULTS FROM YESTERDAY TO [facility]. I TOLD THEM ONLY THE PRELIM WERE AVAILABLE AND THEY STATED THIS WAS FINE BUT THEY WANT US TO FAX THE FULL RESULT WHEN WE GET THAT BACK, THEY WILL ALSO FAX US NOTES FROM THEIR VISIT AS WELL. THE FAX NUMBER FOR THE WOUND CENTER [phone].

## 2024-01-31 ENCOUNTER — TELEPHONE (OUTPATIENT)
Dept: BARIATRICS/WEIGHT MGMT | Facility: CLINIC | Age: 44
End: 2024-01-31
Payer: MEDICARE

## 2024-01-31 ENCOUNTER — PRIOR AUTHORIZATION (OUTPATIENT)
Dept: BARIATRICS/WEIGHT MGMT | Facility: CLINIC | Age: 44
End: 2024-01-31
Payer: MEDICARE

## 2024-02-01 LAB
BACTERIA SPEC AEROBE CULT: ABNORMAL
BACTERIA SPEC AEROBE CULT: ABNORMAL
GRAM STN SPEC: ABNORMAL

## 2024-02-02 RX ORDER — CEFDINIR 300 MG/1
300 CAPSULE ORAL 2 TIMES DAILY
Qty: 20 CAPSULE | Refills: 0 | Status: SHIPPED | OUTPATIENT
Start: 2024-02-02

## 2024-02-02 NOTE — PROGRESS NOTES
Please call patient and let him know I reviewed his wound culture.  Please advise him to stop the clindamycin and it does not cover the organism that is growing.  I prescribed him Omnicef.  He is to start taking his antibiotic.  I encouraged him to follow-up with wound care.

## 2024-02-05 ENCOUNTER — TELEPHONE (OUTPATIENT)
Dept: FAMILY MEDICINE CLINIC | Facility: CLINIC | Age: 44
End: 2024-02-05
Payer: MEDICARE

## 2024-02-05 NOTE — TELEPHONE ENCOUNTER
TI Finksea LORELEI Boggs, APRN  2/2/2024  7:52 AM EST       Please let him know I reviewed his wound culture.  Please advise him to stop the clindamycin and it does not cover the organism that is growing.  I prescribed him Omnicef.  He is to start taking his antibiotic.  I encouraged him to follow-up with wound care.        LVM informing pt

## 2024-02-16 NOTE — PROGRESS NOTES
"Nutrition Services    Patient Name: Farhat Hein  YOB: 1980  MRN: [id number]  Date of Service: 02/19/24      ICD-10-CM ICD-9-CM   1. Super obese  E66.9 278.00        NUTRITION ASSESSMENT - BARIATRIC SURGERY      Reason for Visit MWM follow up, appt 2     H&P      Past Medical History:   Diagnosis Date    Allergic 1979    seasonal    Arthritis 01/05/2020    legs    Chronic obstructive pulmonary disease 07/17/2023    COPD (chronic obstructive pulmonary disease) 04/01/2011    GERD (gastroesophageal reflux disease) 06/06/2006    Hypertension 06/06/2006    Kidney stone 08/08/2010    Low back pain 01/01/2020    Obesity 01/21/1997    Pneumonia 11/2023    Renal insufficiency 03/2021    Stroke 01/21/2015 01/21/2016    Substance abuse     Type 2 diabetes mellitus 07/27/2019    Urinary tract infection 03/15/2020       Past Surgical History:   Procedure Laterality Date    CHOLECYSTECTOMY  02/2016        Previous Goals   Patient to add color to breakfast with fruits or vegetables - met  Patient to aim for 1 diet soda each day and cut out big red - met  Patient to add protein source with snacks - met   Patient to try eating more slowly to help with portions - working on       Encounter Information        Visit Narrative     Patient has been trying to get in more color (fruits/veggies). Patient reports eating more slowly and states he has been going back for seconds of veggies. Patient and wife have been trying to make healthier choices together.     Diet Recall:   Breakfast: eggs, pierce, biscuits/gravy  Lunch: sandwich  Dinner: meat with potatoes and veggies  Snacks: PB crackers, popcorn  Beverages:  oz of water, 3-4x/week will have a big red or diet soda    Exercise: sees  Thursday     Supplements:    Self Monitoring: patient is tracking on My Fitness Pal         Anthropometrics        Current Height, Weight Height: 178.4 cm (70.25\")  Weight: (!) 215 kg (473 lb 14.4 oz) (02/19/24 " 1245)            Wt Readings from Last 30 Encounters:   02/19/24 1245 (!) 215 kg (473 lb 14.4 oz)   01/29/24 1014 (!) 216 kg (476 lb)   01/25/24 0856 (!) 215 kg (473 lb)   12/26/23 1450 (!) 215 kg (474 lb)   12/22/23 0823 (!) 215 kg (474 lb)   12/19/23 1343 (!) 209 kg (461 lb)   12/13/23 1450 (!) 209 kg (461 lb)   11/28/23 1042 (!) 209 kg (461 lb)   11/09/23 2136 (!) 200 kg (440 lb)   11/03/23 1415 (!) 205 kg (451 lb 12.8 oz)   09/21/23 0900 (!) 205 kg (451 lb 12.8 oz)   07/18/23 1301 (!) 207 kg (457 lb)   04/03/17 1354 (!) 195 kg (430 lb 15.9 oz)   03/02/17 0722 (!) 195 kg (430 lb 15.9 oz)   11/15/16 1105 (!) 195 kg (430 lb 15.9 oz)      BMI kg/m2 Body mass index is 67.51 kg/m².       Nutrition Diagnosis         Nutrition Dx Statement Overweight/obesity RT multifactorial biochemical, behavioral and environmental contributors to disease AEB BMI 67.51 kg/m^2         Nutrition Intervention         Nutrition Intervention Nutrition education related to diet modification and physical activity        Monitor/Evaluation        New Goals Patient to continue balanced meals with protein, color and carbs  Patient to start exercise, working with   Patient to continue water intake       Total time spent with pt 15 minutes of which 15 minutes were spent on education.       Electronically signed by:  Sameera Bethea RD  02/19/24 13:22 EST

## 2024-02-19 ENCOUNTER — OFFICE VISIT (OUTPATIENT)
Dept: BARIATRICS/WEIGHT MGMT | Facility: CLINIC | Age: 44
End: 2024-02-19
Payer: MEDICARE

## 2024-02-19 VITALS
SYSTOLIC BLOOD PRESSURE: 139 MMHG | BODY MASS INDEX: 45.1 KG/M2 | HEIGHT: 70 IN | OXYGEN SATURATION: 97 % | WEIGHT: 315 LBS | DIASTOLIC BLOOD PRESSURE: 87 MMHG | HEART RATE: 77 BPM

## 2024-02-19 DIAGNOSIS — E66.9 SUPER OBESE: Primary | ICD-10-CM

## 2024-02-19 DIAGNOSIS — Z79.899 MEDICATION MANAGEMENT: ICD-10-CM

## 2024-02-19 DIAGNOSIS — E66.01 CLASS 3 OBESITY: ICD-10-CM

## 2024-02-19 PROCEDURE — 3075F SYST BP GE 130 - 139MM HG: CPT | Performed by: NURSE PRACTITIONER

## 2024-02-19 PROCEDURE — 3079F DIAST BP 80-89 MM HG: CPT | Performed by: NURSE PRACTITIONER

## 2024-02-19 PROCEDURE — 99214 OFFICE O/P EST MOD 30 MIN: CPT | Performed by: NURSE PRACTITIONER

## 2024-02-19 PROCEDURE — 1159F MED LIST DOCD IN RCRD: CPT | Performed by: NURSE PRACTITIONER

## 2024-02-19 PROCEDURE — 1160F RVW MEDS BY RX/DR IN RCRD: CPT | Performed by: NURSE PRACTITIONER

## 2024-02-19 RX ORDER — SEMAGLUTIDE 0.68 MG/ML
INJECTION, SOLUTION SUBCUTANEOUS
Qty: 3 ML | Refills: 0 | Status: SHIPPED | OUTPATIENT
Start: 2024-02-19 | End: 2024-02-19

## 2024-02-19 RX ORDER — SEMAGLUTIDE 1.34 MG/ML
1 INJECTION, SOLUTION SUBCUTANEOUS WEEKLY
Qty: 3 ML | Refills: 0 | Status: SHIPPED | OUTPATIENT
Start: 2024-02-19

## 2024-02-19 NOTE — PROGRESS NOTES
[facility] BAR SURG [facility] BARIATRIC SURGERY  [address] [place] [address]  [address] [place] [address]  Dept: [phone]  2/19/2024      Farhat Hein.  [id number]  [id number]  1980  male      Chief Complaint   Patient presents with    Weight Loss     MWM #2       The patient is here for month 2 of medical weight management. He had a loss of 3 lbs. The patient states that he is following the recommendations given by our office and dietician including a high lean protein, low carb and low fat diet. We recommended adequate fruits and vegetable intake along with limited portion sizes. Patient is working on eliminating fast foods, fried foods, sweets and soda. Farhat Hein has been increasing his daily water intake. He has been exercising: walking some.  Wt Readings from Last 10 Encounters:   02/19/24 (!) 215 kg (473 lb 14.4 oz)   01/29/24 (!) 216 kg (476 lb)   01/25/24 (!) 215 kg (473 lb)   12/26/23 (!) 215 kg (474 lb)   12/22/23 (!) 215 kg (474 lb)   12/19/23 (!) 209 kg (461 lb)   12/13/23 (!) 209 kg (461 lb)   11/28/23 (!) 209 kg (461 lb)   11/09/23 (!) 200 kg (440 lb)   11/03/23 (!) 205 kg (451 lb 12.8 oz)     Patient states they have made positive changes including helping slightly with portion control   The patient admits to be struggling with none    135-140 blood sugars ( after eating cake)  No nausea or vomiting   Currently on the 0.5 mg weekly dosage of ozempic- per pt, this office is going to take over prescribing his ozempic  Ct scan to rule out umbilical hernia- hospital called to schedule but pt has to go to [facility] due to weight capacities, has not been scheduled for this yet  Also dealing with cellulitis of leg   Currently wanting to proceed with medical program - may think about surgery eventually but high risk with blood clotting disorder      Breakfast: eggs, pierce, biscuits, gravy  Lunch: [place]  Dinner: meat, potatoes, veggie   Snacks:  pb crackers, popcorn   Drinks: water     Review of Systems   Constitutional:  Positive for activity change and appetite change.   Respiratory: Negative.     Cardiovascular: Negative.    Gastrointestinal:  Positive for abdominal pain.   Musculoskeletal:  Positive for arthralgias, back pain and myalgias.     Vitals:    02/19/24 1245   BP: 139/87   Pulse: 77   SpO2: 97%         Body mass index is 67.51 kg/m².    The following portions of the patient's history were reviewed and updated as appropriate: active problem list, medication list, allergies, social history, notes from last encounter  Past Medical History:   Diagnosis Date    Allergic 1980    seasonal    Arthritis 01/05/2020    legs    Chronic obstructive pulmonary disease 07/17/2023    COPD (chronic obstructive pulmonary disease) 04/01/2011    GERD (gastroesophageal reflux disease) 06/06/2006    Hypertension 06/06/2006    Kidney stone 08/08/2010    Low back pain 01/01/2020    Obesity 01/21/1997    Pneumonia 11/2023    Renal insufficiency 03/2021    Stroke 01/21/2015 01/21/2016    Substance abuse     Type 2 diabetes mellitus 07/27/2019    Urinary tract infection 03/15/2020     Past Surgical History:   Procedure Laterality Date    CHOLECYSTECTOMY  02/2016        Physical Exam  Constitutional:       Appearance: Normal appearance. He is obese.   Pulmonary:      Effort: Pulmonary effort is normal.   Abdominal:      General: Abdomen is flat.      Palpations: Abdomen is soft.   Skin:     General: Skin is warm and dry.   Neurological:      General: No focal deficit present.      Mental Status: He is alert and oriented to person, place, and time.   Psychiatric:         Mood and Affect: Mood normal.         Behavior: Behavior normal.         Thought Content: Thought content normal.         Judgment: Judgment normal.         Discussion/Plan:    BMI 67.51 , Class 3 obesity, medication management: ozempic     Obesity/Morbid Obesity: Currently the patient's weight is  decreased. Treatment plan includes prescribed diet, prescribed exercise regimen and behavior modification.     Medication options for weight loss were discussed with the pt and based upon the patient's history and insurance , ozempic will be continued. Ok to increase to 1 mg weekly after finishing 4 weeks of the 0.5 mg dosage. Pt denies any nausea/ vomiting or abdominal pain. Pt to call office with any symptoms listed above.    Pt  denies any hx of multiple endocrine neoplasia type 2 or medullary thyroid cancer for herself or her immediate family with GLP-1's. Pt encouraged to call the office with any nausea/ vomiting or abdominal pain with GLP-1's. Pt also informed constipation can happen with these medications due to slowing of gut. Pt encouraged to take a stool softener/ laxative if constipation occurs.     I reviewed the appropriate dietary choices with the patient and encouraged the necessary changes. Recommended at least 70 grams of protein per day, around 35 grams of fats and less than 100 grams of carbohydrates. Reviewed calorie intake if patient wanted to calorie count and/or had BMR. Instructed patient to drink half of body weight in ounces per day and exercise a minimum of 150 minutes per week including both cardio and strength training. Discussed the option of keeping a food journal which will help patient become more aware of the nutritional value of foods .     The patient was given written materials from our office for diet plans and medications.   I answered all of the patients questions regarding dietary changes, exercise, and medications.   The patient will follow up in 1 month. The total time spent face to face was 30 minutes with 15 minutes spent counseling.    Encounter Diagnosis   Name Primary?    Super obese Yes       SAMI Hogan  [facility]ariat[facility]

## 2024-02-19 NOTE — TELEPHONE ENCOUNTER
Rx Refill Note  Requested Prescriptions     Pending Prescriptions Disp Refills    Ozempic, 0.25 or 0.5 MG/DOSE, 2 MG/3ML solution pen-injector [Pharmacy Med Name: OZEMPIC 0.25-0.5 MG/DOSE(2 MG/3 ML)] 3 mL 0     Sig: DIAL AND INJECT UNDER THE SKIN 0.25 MG WEEKLY      Last office visit with prescribing clinician: 1/29/2024   Last telemedicine visit with prescribing clinician: Visit date not found   Next office visit with prescribing clinician: 3/22/2024        Lipid Panel (12/22/2023 09:12)                  Would you like a call back once the refill request has been completed: [] Yes [] No    If the office needs to give you a call back, can they leave a voicemail: [] Yes [] No    Claudia Jiménez, RT  02/19/24, 09:25 EST

## 2024-02-26 RX ORDER — HYDRALAZINE HYDROCHLORIDE 25 MG/1
25 TABLET, FILM COATED ORAL 3 TIMES DAILY
Qty: 90 TABLET | Refills: 0 | Status: SHIPPED | OUTPATIENT
Start: 2024-02-26

## 2024-02-26 NOTE — TELEPHONE ENCOUNTER
Rx Refill Note  Requested Prescriptions     Pending Prescriptions Disp Refills    hydrALAZINE (APRESOLINE) 25 MG tablet [Pharmacy Med Name: hydrALAZINE 25 MG TABLET] 90 tablet 0     Sig: TAKE ONE TABLET BY MOUTH THREE TIMES A DAY      Last office visit with prescribing clinician: 1/29/2024   Last telemedicine visit with prescribing clinician: Visit date not found   Next office visit with prescribing clinician: 3/22/2024                         Would you like a call back once the refill request has been completed: [] Yes [] No    If the office needs to give you a call back, can they leave a voicemail: [] Yes [] No    Danya Conley, ROSE  02/26/24, 09:12 EST

## 2024-02-28 RX ORDER — SEMAGLUTIDE 0.68 MG/ML
0.5 INJECTION, SOLUTION SUBCUTANEOUS WEEKLY
Qty: 3 ML | Refills: 0 | OUTPATIENT
Start: 2024-02-28

## 2024-02-28 NOTE — TELEPHONE ENCOUNTER
Called patient pharmacy to verify that updated Rx sent 2/19/24 of 4mg/3ml was received, and Chace pharm tech confirmed they received and that this Rx was ok to deny.

## 2024-03-07 ENCOUNTER — TELEMEDICINE (OUTPATIENT)
Dept: SLEEP MEDICINE | Facility: CLINIC | Age: 44
End: 2024-03-07
Payer: MEDICARE

## 2024-03-07 DIAGNOSIS — R06.83 SNORING: Primary | ICD-10-CM

## 2024-03-07 DIAGNOSIS — E66.01 MORBID (SEVERE) OBESITY DUE TO EXCESS CALORIES: ICD-10-CM

## 2024-03-07 DIAGNOSIS — Z86.73 HISTORY OF STROKE: ICD-10-CM

## 2024-03-07 DIAGNOSIS — J44.9 CHRONIC OBSTRUCTIVE PULMONARY DISEASE, UNSPECIFIED COPD TYPE: ICD-10-CM

## 2024-03-07 DIAGNOSIS — G47.10 HYPERSOMNOLENCE: ICD-10-CM

## 2024-03-07 NOTE — PROGRESS NOTES
[id number] [place] [address]  Phone   Fax         SLEEP CLINIC FOLLOW-UP PROGRESS NOTE    Farhat Hein  [id number]   1980  44 y.o.  male      PCP: Mis Boggs APRN    DATE OF VISIT: 3/7/2024          CHIEF COMPLAINT: Suspected sleep apnea    HPI:  This is a 44 y.o. year old patient who presents today via telehealth MyChart video visit for suspected sleep apnea.  Patient has consented to receive care via telehealth.  Patient and provider are both located in [place].  Patient has history of snoring, nonrestorative sleep, witnessed apnea.  He also was looking to gastric bypass and told he needed to have sleep apnea treated prior to this.  He canceled 10/9/2023 in lab sleep study and did not show up for 10/18/2023 in lab sleep study.  Missed 12/2023 visit as well.  Canceled 2/2024 sleep study and 2/2024 office follow-up with provider.  He now presents today for follow-up via telehealth.  He is ready to proceed with a sleep study.  We discussed home study versus in lab.  I do think that in lab would be more comprehensive especially with his advanced BMI, but I think home study would be better than nothing.  He is okay with doing in lab.  He continues to have the above symptoms of sleep apnea.  Bedtime around midnight, wake up around 7 a.m.  Sleeps fairly well through the night.  Recommend not napping day of study so comes in sleepy that evening.          MEDICATIONS: reviewed     ALLERGIES:  Promethazine, Vancomycin, and Calamine-zinc oxide    SOCIAL HISTORY (habits pertaining to sleep medicine):  Tobacco use: No     REVIEW OF SYSTEMS:   Pertinent positive symptoms are:  Fatigue  Snoring        PHYSICAL EXAMINATION:  CONSTITUTIONAL: Resting comfortably, no acute distress  RESP SYSTEM: Respirations even, unlabored, normal rate  NEURO: Alert and oriented x 3, mood and affect appeared appropriate              ASSESSMENT AND PLAN:  Witnessed  apnea: patient's symptoms and physical examination are still concerning for sleep apnea.   I discussed the signs, symptoms, and pathophysiology of sleep apnea with this patient.  I also reiterated the potential complications of untreated sleep apnea including but not limited to resistant hypertension, insulin resistance, pulmonary hypertension, atrial fibrillation, stroke, nonrestorative sleep with hypersomnia which can increase risk for motor vehicle accidents, etc.   Different testing methods including home-based and lab based sleep studies were discussed with this patient.   Based on patient history and physical examination, will proceed with in-lab polysomnogram.  The order for the sleep study is placed in 9facts.  The test will be scheduled after prior authorization has been obtained through patient's insurance.  Treatment and management will be discussed in more detail with this patient after the test is completed.    Severe Obesity: Patients who are overweight or obese are at increased risk of sleep apnea/ sleep disordered breathing. Weight reduction and healthy lifestyle are encouraged in overweight/ obese patients as part of a comprehensive approach to sleep apnea treatment.  He is still considering possible gastric bypass in the future but for now is working on medical weight loss through specialist.  On ozempic.  Hypertension  History of RLS: Managed by outside provider  Hyperlipidemia  Factor V  History of CVA  Type 2 diabetes  CKD  Chronic back pain      Patient will follow-up after study, 31 to 90 days after PAP initiated if applicable, or follow-up sooner for any issues or concerns.  Patient's questions were answered.          Thank you for allowing me to participate in the care of this patient.     Toshia Kline DNP, APRN  [facility]

## 2024-03-08 ENCOUNTER — TELEPHONE (OUTPATIENT)
Dept: BARIATRICS/WEIGHT MGMT | Facility: CLINIC | Age: 44
End: 2024-03-08
Payer: MEDICARE

## 2024-03-08 NOTE — TELEPHONE ENCOUNTER
Lmvm for patient, due to scheduling conflict, need to reschedule Monday March 11 2024 appt. To either that afternoon or another day.

## 2024-03-13 ENCOUNTER — OFFICE VISIT (OUTPATIENT)
Dept: BARIATRICS/WEIGHT MGMT | Facility: CLINIC | Age: 44
End: 2024-03-13
Payer: MEDICARE

## 2024-03-13 VITALS
HEIGHT: 70 IN | SYSTOLIC BLOOD PRESSURE: 173 MMHG | DIASTOLIC BLOOD PRESSURE: 99 MMHG | WEIGHT: 315 LBS | HEART RATE: 85 BPM | OXYGEN SATURATION: 94 % | BODY MASS INDEX: 45.1 KG/M2

## 2024-03-13 DIAGNOSIS — Z79.899 MEDICATION MANAGEMENT: ICD-10-CM

## 2024-03-13 DIAGNOSIS — E66.01 CLASS 3 OBESITY: Primary | ICD-10-CM

## 2024-03-13 PROCEDURE — 1159F MED LIST DOCD IN RCRD: CPT | Performed by: NURSE PRACTITIONER

## 2024-03-13 PROCEDURE — 99213 OFFICE O/P EST LOW 20 MIN: CPT | Performed by: NURSE PRACTITIONER

## 2024-03-13 PROCEDURE — 3080F DIAST BP >= 90 MM HG: CPT | Performed by: NURSE PRACTITIONER

## 2024-03-13 PROCEDURE — 3077F SYST BP >= 140 MM HG: CPT | Performed by: NURSE PRACTITIONER

## 2024-03-13 PROCEDURE — 1160F RVW MEDS BY RX/DR IN RCRD: CPT | Performed by: NURSE PRACTITIONER

## 2024-03-13 RX ORDER — SEMAGLUTIDE 2.68 MG/ML
2 INJECTION, SOLUTION SUBCUTANEOUS WEEKLY
Qty: 9 ML | Refills: 1 | Status: SHIPPED | OUTPATIENT
Start: 2024-03-13

## 2024-03-13 NOTE — PROGRESS NOTES
[facility] BAR SURG [facility] BARIATRIC SURGERY  [address] [place] [address]  [address] [place] [address]  Dept: [phone]  3/13/2024      Farhat Hein.  [id number]  [id number]  1980  male      Chief Complaint   Patient presents with    Weight Loss     MWM #3       The patient is here for month 3 of medical weight management. He had a loss of 2 lbs. The patient states that they are following the recommendations given by our office and dietician including a high lean protein, low carb and low fat diet. We recommended adequate fruits and vegetable intake along with limited portion sizes. Patient is working on eliminating fast foods, fried foods, sweets and soda. Farhat Hein has been increasing his daily water intake. He has been exercising: walking at [employer],starting  weight training soon.  Wt Readings from Last 10 Encounters:   03/13/24 (!) 214 kg (471 lb)   02/19/24 (!) 215 kg (473 lb 14.4 oz)   01/29/24 (!) 216 kg (476 lb)   01/25/24 (!) 215 kg (473 lb)   12/26/23 (!) 215 kg (474 lb)   12/22/23 (!) 215 kg (474 lb)   12/19/23 (!) 209 kg (461 lb)   12/13/23 (!) 209 kg (461 lb)   11/28/23 (!) 209 kg (461 lb)   11/09/23 (!) 200 kg (440 lb)     Patient states they have made positive changes including cutting back on soda, walking more  The patient admits to be struggling with none      Breakfast: avocado toast ( 2 pieces), egg and meat with coffee and cream   [place] for lunch with chips prn   Dinner: meat and veggie   Snacks: peanut butter, nuts, popcorn, cheese and crackers,   Drinks: SF tea, water, diet pepsi 1 a day   [place] , going to start weight training soon       No nausea or vomiting with ozempic. On 1 mg dosage- has done 3 doses of this dose so far  Review of Systems   Constitutional:  Positive for activity change and appetite change.   Respiratory: Negative.     Cardiovascular: Negative.    Gastrointestinal: Negative.     Musculoskeletal:  Positive for back pain.   Skin:         Leg wound     Vitals:    03/13/24 1251   BP: 173/99   Pulse: 85   SpO2: 94%       Body mass index is 67.1 kg/m².    The following portions of the patient's history were reviewed and updated as appropriate: active problem list, medication list, allergies, social history, notes from last encounter  Past Medical History:   Diagnosis Date    Allergic 1979    seasonal    Arthritis 01/05/2020    legs    Chronic obstructive pulmonary disease 07/17/2023    COPD (chronic obstructive pulmonary disease) 04/01/2011    GERD (gastroesophageal reflux disease) 06/06/2006    Hypertension 06/06/2006    Kidney stone 08/08/2010    Low back pain 01/01/2020    Obesity 01/21/1997    Pneumonia 11/2023    Renal insufficiency 03/2021    Stroke 01/21/2015 01/21/2016    Substance abuse     Type 2 diabetes mellitus 07/27/2019    Urinary tract infection 03/15/2020     Past Surgical History:   Procedure Laterality Date    CHOLECYSTECTOMY  02/2016        Physical Exam  Constitutional:       Appearance: Normal appearance. He is obese.   Pulmonary:      Effort: Pulmonary effort is normal.   Abdominal:      General: Abdomen is flat.      Palpations: Abdomen is soft.   Skin:     General: Skin is warm and dry.   Neurological:      General: No focal deficit present.      Mental Status: He is alert and oriented to person, place, and time.   Psychiatric:         Mood and Affect: Mood normal.         Behavior: Behavior normal.         Thought Content: Thought content normal.         Judgment: Judgment normal.         Discussion/Plan:  BMI 67.10, Class 3 obesity, medication management: ozempic    Obesity/Morbid Obesity: Currently the patient's weight is decreased. Treatment plan includes prescribed diet, prescribed exercise regimen and behavior modification.     Medication options for weight loss were discussed with the pt and based upon the patient's history and insurance , ozempic 2 mg  will be prescribed/ continued. PT is not having any nausea or vomiting with this medication.  Also encourage pt to try 1 protein supplement a day and start strength training once leg Is healed.      Pt denies any hx of uncontrolled HTN, tachycardia, seizures, hyperthyroidism, glaucoma or chance of pregnancy with phentermine. Pt instructed to call the office and stop the medication ( phentermine) with any chest pain, shortness of breath, seizures or chance of pregnancy. Pt informed max duration of phentermine is 6 months due to increased risk of cardiovascular problems with long term usage.     Pt  denies any hx of multiple endocrine neoplasia type 2 or medullary thyroid cancer for herself or her immediate family with GLP-1's. Pt encouraged to call the office with any nausea/ vomiting or abdominal pain with GLP-1's. Pt also informed constipation can happen with these medications due to slowing of gut. Pt encouraged to take a stool softener/ laxative if constipation occurs.     Will refill ozempic 2 mg medication for pt today.     I reviewed the appropriate dietary choices with the patient and encouraged the necessary changes. Recommended at least 70 grams of protein per day, around 35 grams of fats and less than 100 grams of carbohydrates. Reviewed calorie intake if patient wanted to calorie count and/or had BMR. Instructed patient to drink half of body weight in ounces per day and exercise a minimum of 150 minutes per week including both cardio and strength training. Discussed the option of keeping a food journal which will help patient become more aware of the nutritional value of foods .     The patient was given written materials from our office for diet plans and medications.   I answered all of the patients questions regarding dietary changes, exercise, and medications.   The patient will follow up in 1 month. The total time spent face to face was 20 minutes with 15 minutes spent counseling.      Penny Rueda  SAMI  [facility]

## 2024-03-19 RX ORDER — SEMAGLUTIDE 1.34 MG/ML
1 INJECTION, SOLUTION SUBCUTANEOUS WEEKLY
Qty: 3 ML | Refills: 0 | OUTPATIENT
Start: 2024-03-19

## 2024-03-19 NOTE — PROGRESS NOTES
"Chief Complaint  Chief Complaint   Patient presents with    Depression     3 month follow up         Subjective          Farhat Hein is  a 44 year old male who presents to the office today for follow-up on diabetes.     Type II Diabetes- He has been watching what he eats. He has been doing weight lifting. On Ozempic. He is currently seeing Bariatrics. He is due for diabetic eye exam.     Depression- He reports it has been good. Controlled on medication. He is currently on zoloft. Denies SI or HI.     Chronic low back pain- He has never had imaging done. Previously took tramadol. Wants a re-fill, but has never been to pain management. Current pain 8/10 in the lower back and is sharp. Walking makes the pain worse. He denies any alleviating factors.          Review of Systems   Constitutional:  Negative for chills and fever.   HENT:  Negative for congestion.    Respiratory:  Negative for shortness of breath.    Cardiovascular:  Negative for chest pain.   Gastrointestinal:  Negative for abdominal pain, nausea and vomiting.   Genitourinary:  Negative for difficulty urinating.   Musculoskeletal:  Positive for back pain.   Skin: Negative.    Neurological:  Negative for dizziness, light-headedness and headache.   Psychiatric/Behavioral:  Negative for self-injury, suicidal ideas and depressed mood. The patient is not nervous/anxious.         Objective   Vital Signs:   Vitals:    03/22/24 1301   BP: 142/98   Pulse:    Temp:    SpO2:       Estimated body mass index is 66.56 kg/m² as calculated from the following:    Height as of this encounter: 178.4 cm (70.24\").    Weight as of this encounter: 212 kg (467 lb).                  Patient screened positive for depression based on a PHQ-9 score of 10 on 12/22/2023. Follow-up recommendations include: PCP managing depression.        Physical Exam  Vitals reviewed.   Constitutional:       Appearance: Normal appearance. He is obese.   HENT:      Head: Normocephalic and atraumatic.     "  Nose: Nose normal.      Mouth/Throat:      Mouth: Mucous membranes are moist.      Pharynx: Oropharynx is clear.   Eyes:      Extraocular Movements: Extraocular movements intact.      Conjunctiva/sclera: Conjunctivae normal.   Cardiovascular:      Rate and Rhythm: Normal rate and regular rhythm.      Pulses: Normal pulses.      Heart sounds: Normal heart sounds.      Comments: S1, S2 audible  Pulmonary:      Effort: Pulmonary effort is normal.      Breath sounds: Normal breath sounds.      Comments: On room air   Abdominal:      General: Abdomen is flat.   Musculoskeletal:         General: Normal range of motion.      Cervical back: Normal range of motion.   Skin:     General: Skin is warm and dry.   Neurological:      General: No focal deficit present.      Mental Status: He is alert and oriented to person, place, and time. Mental status is at baseline.   Psychiatric:         Mood and Affect: Mood normal.         Behavior: Behavior normal.         Thought Content: Thought content normal.         Judgment: Judgment normal.                Physical Exam   Result Review :             Procedures       Assessment and Plan     Diagnoses and all orders for this visit:    1. Anxiety and depression (Primary)  Assessment & Plan:    Depression- He reports it has been good. Controlled on medication. He is currently on zoloft. Denies SI or HI.     Re-filled zoloft         2. Morbid obesity with BMI of 60.0-69.9, adult  Assessment & Plan:  Patient's (Body mass index is 66.56 kg/m².)     Type II Diabetes- He has been watching what he eats. He has been doing weight lifting. On Ozempic. He is currently seeing Bariatrics. He is due for diabetic eye exam.     Continue Ozempic     Check CMP and hbg A1C       3. Type 2 diabetes mellitus with hyperglycemia, unspecified whether long term insulin use  -     Comprehensive Metabolic Panel  -     Hemoglobin A1c    4. Chronic low back pain with bilateral sciatica, unspecified back pain  laterality  Assessment & Plan:  Chronic low back pain- He has never had imaging done. Previously took tramadol. Wants a re-fill, but has never been to pain management. Current pain 8/10 in the lower back and is sharp. Walking makes the pain worse. He denies any alleviating factors.     X-ray lumbar spine ordered   Encourage stretching  Referral to pain management     Orders:  -     XR Spine Lumbar 4+ View; Future  -     Ambulatory Referral to Pain Management    5. Primary hypertension  Assessment & Plan:  BP: 142/98    On hydralazine, carvedilol, torsemide   Increased HCTZ to 50mg daily   Check CMP    Keep BP log- Check BP 1-2 hours after taking medication   Bring BP monitor to next appointment   Encourage low sodium diet   Check BP if symptomatic- Chest pain, shortness of breath, dizziness, lightheadedness, heart palpitations, or headache        Other orders  -     sertraline (Zoloft) 25 MG tablet; Take 1 tablet by mouth Daily.  Dispense: 90 tablet; Refill: 3  -     hydroCHLOROthiazide 25 MG tablet; Take 1 tablet by mouth Daily.  Dispense: 60 tablet; Refill: 0              Follow Up   Return in about 3 months (around 6/22/2024).   Patient was given instructions and counseling regarding her condition or for health maintenance advice. Please see specific information pulled into the AVS if appropriate.     Answers submitted by the patient for this visit:  Primary Reason for Visit (Submitted on 3/20/2024)  What is the primary reason for your visit?: Other  Other (Submitted on 3/20/2024)  Please describe your symptoms.: Pain in legs and back. Breaths getting short  Have you had these symptoms before?: No  How long have you been having these symptoms?: Greater than 2 weeks  Please list any medications you are currently taking for this condition.: Ropinole for the legs at night

## 2024-03-22 ENCOUNTER — OFFICE VISIT (OUTPATIENT)
Dept: FAMILY MEDICINE CLINIC | Facility: CLINIC | Age: 44
End: 2024-03-22
Payer: MEDICARE

## 2024-03-22 ENCOUNTER — PATIENT ROUNDING (BHMG ONLY) (OUTPATIENT)
Dept: FAMILY MEDICINE CLINIC | Facility: CLINIC | Age: 44
End: 2024-03-22

## 2024-03-22 VITALS
WEIGHT: 315 LBS | TEMPERATURE: 98.2 F | OXYGEN SATURATION: 98 % | HEIGHT: 70 IN | BODY MASS INDEX: 45.1 KG/M2 | HEART RATE: 89 BPM | DIASTOLIC BLOOD PRESSURE: 98 MMHG | SYSTOLIC BLOOD PRESSURE: 142 MMHG

## 2024-03-22 DIAGNOSIS — E11.65 TYPE 2 DIABETES MELLITUS WITH HYPERGLYCEMIA, UNSPECIFIED WHETHER LONG TERM INSULIN USE: ICD-10-CM

## 2024-03-22 DIAGNOSIS — I10 PRIMARY HYPERTENSION: ICD-10-CM

## 2024-03-22 DIAGNOSIS — F41.9 ANXIETY AND DEPRESSION: Primary | ICD-10-CM

## 2024-03-22 DIAGNOSIS — M54.42 CHRONIC LOW BACK PAIN WITH BILATERAL SCIATICA, UNSPECIFIED BACK PAIN LATERALITY: ICD-10-CM

## 2024-03-22 DIAGNOSIS — G89.29 CHRONIC LOW BACK PAIN WITH BILATERAL SCIATICA, UNSPECIFIED BACK PAIN LATERALITY: ICD-10-CM

## 2024-03-22 DIAGNOSIS — E66.01 MORBID OBESITY WITH BMI OF 60.0-69.9, ADULT: ICD-10-CM

## 2024-03-22 DIAGNOSIS — F32.A ANXIETY AND DEPRESSION: Primary | ICD-10-CM

## 2024-03-22 DIAGNOSIS — M54.41 CHRONIC LOW BACK PAIN WITH BILATERAL SCIATICA, UNSPECIFIED BACK PAIN LATERALITY: ICD-10-CM

## 2024-03-22 PROCEDURE — 83036 HEMOGLOBIN GLYCOSYLATED A1C: CPT | Performed by: NURSE PRACTITIONER

## 2024-03-22 PROCEDURE — 80053 COMPREHEN METABOLIC PANEL: CPT | Performed by: NURSE PRACTITIONER

## 2024-03-22 RX ORDER — SERTRALINE HYDROCHLORIDE 25 MG/1
25 TABLET, FILM COATED ORAL DAILY
Qty: 90 TABLET | Refills: 3 | Status: SHIPPED | OUTPATIENT
Start: 2024-03-22

## 2024-03-22 RX ORDER — HYDROCHLOROTHIAZIDE 25 MG/1
25 TABLET ORAL DAILY
Qty: 60 TABLET | Refills: 0 | Status: SHIPPED | OUTPATIENT
Start: 2024-03-22

## 2024-03-22 NOTE — PROGRESS NOTES
Venipuncture performed in right hand with good hemostasis. Patient tolerated well. Jeanna CESPEDES MA

## 2024-03-22 NOTE — ASSESSMENT & PLAN NOTE
Patient's (Body mass index is 66.56 kg/m².)     Type II Diabetes- He has been watching what he eats. He has been doing weight lifting. On Ozempic. He is currently seeing Bariatrics. He is due for diabetic eye exam.     Continue Ozempic     Check CMP and hbg A1C

## 2024-03-22 NOTE — ASSESSMENT & PLAN NOTE
Chronic low back pain- He has never had imaging done. Previously took tramadol. Wants a re-fill, but has never been to pain management. Current pain 8/10 in the lower back and is sharp. Walking makes the pain worse. He denies any alleviating factors.     X-ray lumbar spine ordered   Encourage stretching  Referral to pain management

## 2024-03-22 NOTE — ASSESSMENT & PLAN NOTE
BP: 142/98    On hydralazine, carvedilol, torsemide   Increased HCTZ to 50mg daily   Check CMP    Keep BP log- Check BP 1-2 hours after taking medication   Bring BP monitor to next appointment   Encourage low sodium diet   Check BP if symptomatic- Chest pain, shortness of breath, dizziness, lightheadedness, heart palpitations, or headache

## 2024-03-22 NOTE — ASSESSMENT & PLAN NOTE
Depression- He reports it has been good. Controlled on medication. He is currently on zoloft. Denies SI or HI.     Re-filled zoloft

## 2024-03-22 NOTE — PATIENT INSTRUCTIONS
Re-filled zoloft  Continue healthy diet and exercise  X-ray lumbar spine ordered  Encourage stretching   Check labs   Encourage diabetic eye exams    Increased HCTZ to 50mg daily   Check CMP    Keep BP log- Check BP 1-2 hours after taking medication   Bring BP monitor to next appointment   Encourage low sodium diet   Check BP if symptomatic- Chest pain, shortness of breath, dizziness, lightheadedness, heart palpitations, or headache

## 2024-03-23 LAB
ALBUMIN SERPL-MCNC: 4.1 G/DL (ref 3.5–5.2)
ALBUMIN/GLOB SERPL: 1.1 G/DL
ALP SERPL-CCNC: 75 U/L (ref 39–117)
ALT SERPL W P-5'-P-CCNC: 27 U/L (ref 1–41)
ANION GAP SERPL CALCULATED.3IONS-SCNC: 10 MMOL/L (ref 5–15)
AST SERPL-CCNC: 21 U/L (ref 1–40)
BILIRUB SERPL-MCNC: 0.4 MG/DL (ref 0–1.2)
BUN SERPL-MCNC: 15 MG/DL (ref 6–20)
BUN/CREAT SERPL: 14.3 (ref 7–25)
CALCIUM SPEC-SCNC: 9.2 MG/DL (ref 8.6–10.5)
CHLORIDE SERPL-SCNC: 104 MMOL/L (ref 98–107)
CO2 SERPL-SCNC: 26 MMOL/L (ref 22–29)
CREAT SERPL-MCNC: 1.05 MG/DL (ref 0.76–1.27)
EGFRCR SERPLBLD CKD-EPI 2021: 89.2 ML/MIN/1.73
GLOBULIN UR ELPH-MCNC: 3.7 GM/DL
GLUCOSE SERPL-MCNC: 97 MG/DL (ref 65–99)
HBA1C MFR BLD: 5.6 % (ref 4.8–5.6)
POTASSIUM SERPL-SCNC: 3.8 MMOL/L (ref 3.5–5.2)
PROT SERPL-MCNC: 7.8 G/DL (ref 6–8.5)
SODIUM SERPL-SCNC: 140 MMOL/L (ref 136–145)

## 2024-03-25 RX ORDER — ROPINIROLE 0.25 MG/1
TABLET, FILM COATED ORAL
Qty: 90 TABLET | Refills: 0 | Status: SHIPPED | OUTPATIENT
Start: 2024-03-25

## 2024-03-25 NOTE — TELEPHONE ENCOUNTER
Rx Refill Note  Requested Prescriptions     Pending Prescriptions Disp Refills    rOPINIRole (REQUIP) 0.25 MG tablet [Pharmacy Med Name: rOPINIRole HCL 0.25 MG TABLET] 90 tablet 0     Sig: TAKE 1 TABLET BY MOUTH EVERY NIGHT AT BEDTIME ** TAKE ONE HOUR BEFORE BED**      Last office visit with prescribing clinician: 3/22/2024   Last telemedicine visit with prescribing clinician: Visit date not found   Next office visit with prescribing clinician: 4/5/2024                         Would you like a call back once the refill request has been completed: [] Yes [] No    If the office needs to give you a call back, can they leave a voicemail: [] Yes [] No    Danya Conley CMA  03/25/24, 10:24 EDT

## 2024-03-27 ENCOUNTER — TELEPHONE (OUTPATIENT)
Dept: FAMILY MEDICINE CLINIC | Facility: CLINIC | Age: 44
End: 2024-03-27

## 2024-03-27 NOTE — TELEPHONE ENCOUNTER
Caller: Farhat Hein    Relationship to patient: Self    Best call back number: [phone]    Patient is needing:     RETURNING MISSED CALL FROM OFFICE, NOT SURE WHAT IT WAS CONCERNING.

## 2024-04-01 RX ORDER — HYDRALAZINE HYDROCHLORIDE 25 MG/1
25 TABLET, FILM COATED ORAL 3 TIMES DAILY
Qty: 90 TABLET | Refills: 0 | Status: SHIPPED | OUTPATIENT
Start: 2024-04-01

## 2024-04-02 NOTE — PROGRESS NOTES
"Chief Complaint  Chief Complaint   Patient presents with    Hypertension     2 week follow up         Subjective          Farhat Hein is a 44-year-old male who reports to the office today for follow-up on hypertension.    Hypertension- Denies CP, SOA, Dizziness, lightheadedness, or HA. Compliant on medication. He has been checking his BP at home and highhest was 145/92. He reports he exercised before he came.           Hypertension  This is a chronic problem. The current episode started more than 1 year ago. The problem has been worse since onset. Associated symptoms include anxiety, headaches, malaise/fatigue, orthopnea and peripheral edema. Pertinent negatives include no blurred vision, chest pain, palpitations or shortness of breath. There are no associated agents to hypertension. There are no compliance problems.         Review of Systems   Constitutional:  Positive for malaise/fatigue. Negative for chills and fever.   HENT:  Negative for congestion.    Eyes:  Negative for blurred vision.   Respiratory:  Negative for shortness of breath.    Cardiovascular:  Positive for orthopnea. Negative for chest pain and palpitations.   Gastrointestinal:  Negative for abdominal pain, nausea and vomiting.   Genitourinary:  Negative for difficulty urinating.   Musculoskeletal:  Positive for back pain.   Skin: Negative.    Neurological:  Negative for dizziness, light-headedness and headache.        Objective   Vital Signs:   Vitals:    04/05/24 1308   BP: 136/88   Pulse:    Temp:    SpO2:       Estimated body mass index is 66.27 kg/m² as calculated from the following:    Height as of this encounter: 178.4 cm (70.24\").    Weight as of this encounter: 211 kg (465 lb).                          Physical Exam  Vitals reviewed.   Constitutional:       Appearance: Normal appearance. He is obese.   HENT:      Head: Normocephalic and atraumatic.      Nose: Nose normal.      Mouth/Throat:      Mouth: Mucous membranes are moist.      " Pharynx: Oropharynx is clear.   Eyes:      Extraocular Movements: Extraocular movements intact.      Conjunctiva/sclera: Conjunctivae normal.   Cardiovascular:      Rate and Rhythm: Normal rate and regular rhythm.      Pulses: Normal pulses.      Heart sounds: Normal heart sounds.      Comments: S1, S2 audible  Pulmonary:      Effort: Pulmonary effort is normal.      Breath sounds: Normal breath sounds.      Comments: On room air   Abdominal:      General: Abdomen is flat.   Musculoskeletal:         General: Normal range of motion.      Cervical back: Normal range of motion.   Skin:     General: Skin is warm and dry.   Neurological:      General: No focal deficit present.      Mental Status: He is alert and oriented to person, place, and time. Mental status is at baseline.   Psychiatric:         Mood and Affect: Mood normal.         Behavior: Behavior normal.         Thought Content: Thought content normal.         Judgment: Judgment normal.                Physical Exam   Result Review :             Procedures       Assessment and Plan     Diagnoses and all orders for this visit:    1. Primary hypertension (Primary)  Assessment & Plan:  BP:136/88  Hypertension- Denies CP, SOA, Dizziness, lightheadedness, or HA. Compliant on medication. He has been checking his BP at home and highhest was 145/92. He reports he exercised before he came.          Continue hydralazine, carvedilol, torsemide, HCTZ     Check BP if symptomatic- Chest pain, shortness of breath, dizziness, lightheadedness, heart palpitations, or headache          2. Chronic low back pain with bilateral sciatica, unspecified back pain laterality  Assessment & Plan:  Current back pain 8/10. Describes the pain as sharp stabbing pain.     X-ray lumbar spine ordered  Has referral to pain management, but needs X-ray first  Encourage back exercises     Orders:  -     XR Spine Lumbar 4+ View (In Office)    Other orders  -     carvedilol (COREG) 25 MG tablet; Take 1  tablet by mouth 2 (Two) Times a Day With Meals.  Dispense: 180 tablet; Refill: 3              Follow Up   Return in about 3 months (around 7/5/2024) for Diabetes, Recheck.   Patient was given instructions and counseling regarding her condition or for health maintenance advice. Please see specific information pulled into the AVS if appropriate.     Answers submitted by the patient for this visit:  Primary Reason for Visit (Submitted on 4/3/2024)  What is the primary reason for your visit?: High Blood Pressure

## 2024-04-04 RX ORDER — HYDROCHLOROTHIAZIDE 25 MG/1
25 TABLET ORAL DAILY
COMMUNITY
Start: 2024-03-22

## 2024-04-05 ENCOUNTER — OFFICE VISIT (OUTPATIENT)
Dept: FAMILY MEDICINE CLINIC | Facility: CLINIC | Age: 44
End: 2024-04-05
Payer: MEDICARE

## 2024-04-05 VITALS
HEIGHT: 70 IN | SYSTOLIC BLOOD PRESSURE: 136 MMHG | OXYGEN SATURATION: 99 % | BODY MASS INDEX: 45.1 KG/M2 | HEART RATE: 89 BPM | DIASTOLIC BLOOD PRESSURE: 88 MMHG | WEIGHT: 315 LBS | TEMPERATURE: 98.2 F

## 2024-04-05 DIAGNOSIS — I10 PRIMARY HYPERTENSION: Primary | ICD-10-CM

## 2024-04-05 DIAGNOSIS — G89.29 CHRONIC LOW BACK PAIN WITH BILATERAL SCIATICA, UNSPECIFIED BACK PAIN LATERALITY: ICD-10-CM

## 2024-04-05 DIAGNOSIS — M54.41 CHRONIC LOW BACK PAIN WITH BILATERAL SCIATICA, UNSPECIFIED BACK PAIN LATERALITY: ICD-10-CM

## 2024-04-05 DIAGNOSIS — M54.42 CHRONIC LOW BACK PAIN WITH BILATERAL SCIATICA, UNSPECIFIED BACK PAIN LATERALITY: ICD-10-CM

## 2024-04-05 PROCEDURE — 99214 OFFICE O/P EST MOD 30 MIN: CPT | Performed by: NURSE PRACTITIONER

## 2024-04-05 PROCEDURE — 3044F HG A1C LEVEL LT 7.0%: CPT | Performed by: NURSE PRACTITIONER

## 2024-04-05 PROCEDURE — 3075F SYST BP GE 130 - 139MM HG: CPT | Performed by: NURSE PRACTITIONER

## 2024-04-05 PROCEDURE — 3079F DIAST BP 80-89 MM HG: CPT | Performed by: NURSE PRACTITIONER

## 2024-04-05 RX ORDER — CARVEDILOL 25 MG/1
25 TABLET ORAL 2 TIMES DAILY WITH MEALS
Qty: 180 TABLET | Refills: 3 | Status: SHIPPED | OUTPATIENT
Start: 2024-04-05

## 2024-04-05 NOTE — ASSESSMENT & PLAN NOTE
BP:136/88  Hypertension- Denies CP, SOA, Dizziness, lightheadedness, or HA. Compliant on medication. He has been checking his BP at home and highhest was 145/92. He reports he exercised before he came.          Continue hydralazine, carvedilol, torsemide, HCTZ     Check BP if symptomatic- Chest pain, shortness of breath, dizziness, lightheadedness, heart palpitations, or headache

## 2024-04-05 NOTE — PATIENT INSTRUCTIONS
Continue hydralazine, carvedilol, torsemide, HCTZ     Check BP if symptomatic- Chest pain, shortness of breath, dizziness, lightheadedness, heart palpitations, or headache      X-ray lumbar spine ordered

## 2024-04-05 NOTE — ASSESSMENT & PLAN NOTE
Current back pain 8/10. Describes the pain as sharp stabbing pain.     X-ray lumbar spine ordered  Has referral to pain management, but needs X-ray first  Encourage back exercises

## 2024-04-08 ENCOUNTER — TELEMEDICINE (OUTPATIENT)
Dept: FAMILY MEDICINE CLINIC | Facility: TELEHEALTH | Age: 44
End: 2024-04-08
Payer: MEDICARE

## 2024-04-08 VITALS — HEIGHT: 70 IN | WEIGHT: 315 LBS | BODY MASS INDEX: 45.1 KG/M2 | OXYGEN SATURATION: 98 %

## 2024-04-08 DIAGNOSIS — J22 LOWER RESPIRATORY INFECTION: Primary | ICD-10-CM

## 2024-04-08 DIAGNOSIS — R05.1 ACUTE COUGH: ICD-10-CM

## 2024-04-08 PROCEDURE — 99213 OFFICE O/P EST LOW 20 MIN: CPT | Performed by: NURSE PRACTITIONER

## 2024-04-08 RX ORDER — PREDNISONE 20 MG/1
TABLET ORAL
Qty: 13 TABLET | Refills: 0 | Status: SHIPPED | OUTPATIENT
Start: 2024-04-08

## 2024-04-08 RX ORDER — BROMPHENIRAMINE MALEATE, PSEUDOEPHEDRINE HYDROCHLORIDE, AND DEXTROMETHORPHAN HYDROBROMIDE 2; 30; 10 MG/5ML; MG/5ML; MG/5ML
5 SYRUP ORAL NIGHTLY PRN
Qty: 118 ML | Refills: 0 | Status: SHIPPED | OUTPATIENT
Start: 2024-04-08

## 2024-04-08 RX ORDER — BENZONATATE 100 MG/1
CAPSULE ORAL
Qty: 30 CAPSULE | Refills: 0 | Status: SHIPPED | OUTPATIENT
Start: 2024-04-08

## 2024-04-08 RX ORDER — AZITHROMYCIN 250 MG/1
TABLET, FILM COATED ORAL
Qty: 6 TABLET | Refills: 0 | Status: SHIPPED | OUTPATIENT
Start: 2024-04-08

## 2024-04-08 RX ORDER — GUAIFENESIN 600 MG/1
600 TABLET, EXTENDED RELEASE ORAL 2 TIMES DAILY
Qty: 28 TABLET | Refills: 0 | Status: SHIPPED | OUTPATIENT
Start: 2024-04-08 | End: 2024-04-22

## 2024-04-08 NOTE — PROGRESS NOTES
You have chosen to receive care through a telehealth visit.  Do you consent to use a video/audio connection for your medical care today? Yes     HPI  Farhat Hein is a 44 y.o. male  presents with complaint of cough, congestion.   He reports that when he coughs he can hardly breath. No fever or chills He is short of breath >baseline. He is wheezing. He is coughing up green/yellow. Additional symptoms that he is having are noted in the ROS portion of this visit. He reports that he has had his symptoms for a couple of days. He does have COPD, history of pneumonia, Factor V mutation and he is diabetic. No known exposure to illnesses that he is aware of at this time. In addition to using his albuterol inhaler, over the counter he has been taking using Flonase and taking a cold and flu mucinex medication. His oxygen level per pulse oximetry is 98 % His last hgbA1C 03/22.2024 was 5.60.    Review of Systems   Constitutional:  Negative for chills and fever.   HENT:  Positive for congestion, postnasal drip, rhinorrhea, sinus pressure, sinus pain and sneezing. Negative for sore throat.    Respiratory:  Positive for cough (chest), shortness of breath (>baseline) and wheezing. Negative for chest tightness.         Coughing up yellow/green   Cardiovascular:  Positive for leg swelling (baseline).   Musculoskeletal:  Positive for myalgias (from coughing).   Neurological:  Negative for headaches.       Past Medical History:   Diagnosis Date    Allergic 1979    seasonal    Arthritis 01/05/2020    legs    Asthma 01/2024    Chronic obstructive pulmonary disease 07/17/2023    COPD (chronic obstructive pulmonary disease) 04/01/2011    GERD (gastroesophageal reflux disease) 06/06/2006    Hypertension 06/06/2006    Kidney stone 08/08/2010    Low back pain 01/01/2020    Obesity 01/21/1997    Pneumonia 11/2023    Renal insufficiency 03/2021    Stroke 01/21/2015 01/21/2016    Substance abuse     Type 2 diabetes mellitus 07/27/2019     Urinary tract infection 03/15/2020       Family History   Problem Relation Age of Onset    Arthritis Mother     COPD Mother     Diabetes Mother     Hyperlipidemia Mother     Hypertension Mother     Alcohol abuse Father     Arthritis Father     Cancer Father         SKIN    Diabetes Father     Heart disease Father     Hyperlipidemia Father     Hypertension Father     Obesity Father     Arthritis Sister     Cancer Sister         BREAST    Diabetes Sister     Hyperlipidemia Sister     Heart disease Sister     Hypertension Sister     Obesity Sister     Sleep apnea Sister     Arthritis Brother     Cancer Brother         KIDNEY/BRAIN/SKIN    Hyperlipidemia Brother     Kidney disease Brother     Hypertension Brother     Alcohol abuse Brother     Mental illness Brother         schizophrenia    Cancer Sister         COLON    Diabetes Sister             Hyperlipidemia Sister     Hypertension Sister     Diabetes Brother     Heart disease Brother     Hyperlipidemia Brother     Hypertension Brother     Alcohol abuse Brother     Cancer Maternal Grandmother     Cancer Maternal Uncle        Social History     Socioeconomic History    Marital status:    Tobacco Use    Smoking status: Former     Current packs/day: 0.00     Average packs/day: 2.0 packs/day for 10.4 years (20.8 ttl pk-yrs)     Types: Cigarettes     Start date: 1998     Quit date: 2008     Years since quitting: 15.8     Passive exposure: Past    Smokeless tobacco: Never   Vaping Use    Vaping status: Never Used   Substance and Sexual Activity    Alcohol use: Not Currently     Alcohol/week: 12.0 standard drinks of alcohol     Types: 12 Cans of beer per week     Comment: PER DAY    Drug use: Yes     Frequency: 7.0 times per week     Types: Marijuana    Sexual activity: Yes     Partners: Female     Birth control/protection: Condom       Farhat ROBERTO Angel Luis  reports that he quit smoking about 15 years ago. His smoking use included cigarettes. He started  "smoking about 26 years ago. He has a 20.8 pack-year smoking history. He has been exposed to tobacco smoke. He has never used smokeless tobacco.     Ht 178.4 cm (70.24\")   Wt (!) 211 kg (465 lb)   SpO2 98%   BMI 66.27 kg/m²     PHYSICAL EXAM  Physical Exam   Constitutional: He is oriented to person, place, and time. He appears well-developed.   HENT:   Head: Normocephalic and atraumatic.   Nose: Congestion present. Right sinus exhibits maxillary sinus tenderness (patient directed exam) and frontal sinus tenderness (patient directed exam). Left sinus exhibits maxillary sinus tenderness (patient directed exam) and frontal sinus tenderness (patient directed exam).   Eyes: Lids are normal. Right eye exhibits no discharge. Left eye exhibits no discharge. Right conjunctiva is not injected. Left conjunctiva is not injected.   Pulmonary/Chest:  No respiratory distress (persistent congested cough at visit).  Neurological: He is alert and oriented to person, place, and time. No cranial nerve deficit.   Psychiatric: He has a normal mood and affect. His speech is normal and behavior is normal. Judgment and thought content normal.       Results for orders placed or performed in visit on 03/22/24   Comprehensive Metabolic Panel    Specimen: Blood   Result Value Ref Range    Glucose 97 65 - 99 mg/dL    BUN 15 6 - 20 mg/dL    Creatinine 1.05 0.76 - 1.27 mg/dL    Sodium 140 136 - 145 mmol/L    Potassium 3.8 3.5 - 5.2 mmol/L    Chloride 104 98 - 107 mmol/L    CO2 26.0 22.0 - 29.0 mmol/L    Calcium 9.2 8.6 - 10.5 mg/dL    Total Protein 7.8 6.0 - 8.5 g/dL    Albumin 4.1 3.5 - 5.2 g/dL    ALT (SGPT) 27 1 - 41 U/L    AST (SGOT) 21 1 - 40 U/L    Alkaline Phosphatase 75 39 - 117 U/L    Total Bilirubin 0.4 0.0 - 1.2 mg/dL    Globulin 3.7 gm/dL    A/G Ratio 1.1 g/dL    BUN/Creatinine Ratio 14.3 7.0 - 25.0    Anion Gap 10.0 5.0 - 15.0 mmol/L    eGFR 89.2 >60.0 mL/min/1.73   Hemoglobin A1c    Specimen: Blood   Result Value Ref Range    " Hemoglobin A1C 5.60 4.80 - 5.60 %       Diagnoses and all orders for this visit:    1. Lower respiratory infection (Primary)    Other orders  -     guaiFENesin (Mucinex) 600 MG 12 hr tablet; Take 1 tablet by mouth 2 (Two) Times a Day for 14 days.  Dispense: 28 tablet; Refill: 0  -     benzonatate (Tessalon Perles) 100 MG capsule; 1 to 2 capsules 3 times a day  Dispense: 30 capsule; Refill: 0  -     predniSONE (DELTASONE) 20 MG tablet; Prednisone 20mg tabs, 3 for 2 days, 2 for 2 days, 1 for 2 days, 1/2 for 2 days take with food or milk  Dispense: 13 tablet; Refill: 0  -     azithromycin (Zithromax) 250 MG tablet; Take 2 tablets the first day, then 1 tablet daily for 4 days.  Dispense: 6 tablet; Refill: 0  -     brompheniramine-pseudoephedrine-DM 30-2-10 MG/5ML syrup; Take 5 mL by mouth At Night As Needed for Allergies.  Dispense: 118 mL; Refill: 0    Mucinex with plenty of fluids especially water to thin secretions and help with congestion.  Tessalon perles as needed for cough during day  Bromfed as needed for cough at night  Do not take Bromfed and tessalone perles with in 4 hours of each  other  Albuterol inhaler you have on hand as needed and directred for shortness of breath and wheezing  Probiotics for two weeks related to taking antibiotics. The pharmacist can help you with this if needed. Do not take within two hours of antibiotic.  Take prednisone with food as early in the day as possible  Do not take prednisone with nsaids such as ibuprofen, aleve, or aspirin  May take tylenol for pain or fever  Monitor blood sugars closely  Monitor health heart, constant carb diet closely    FOLLOW-UP  If symptoms worsen or persist follow up with PCP, Virtual Care, Urgent Care or ER dependent on severity of symptoms     Patient verbalizes understanding of medication dosage, comfort measures, instructions for treatment and follow-up.    SAMI Romero  04/08/2024  10:07 EDT    The use of a video visit has been reviewed  with the patient and verbal informed consent has been obtained. Myself and Farhat Hein participated in this visit. The patient is located in [address] [place] [address] [place] [facility].    I am located in [place], [place]. [employer] and [employer] were utilized. I spent 30 minutes in the patient's chart for this visit.

## 2024-04-17 ENCOUNTER — OFFICE VISIT (OUTPATIENT)
Dept: FAMILY MEDICINE CLINIC | Facility: CLINIC | Age: 44
End: 2024-04-17
Payer: MEDICARE

## 2024-04-17 VITALS
OXYGEN SATURATION: 97 % | HEART RATE: 84 BPM | TEMPERATURE: 98 F | WEIGHT: 315 LBS | BODY MASS INDEX: 45.1 KG/M2 | HEIGHT: 70 IN | DIASTOLIC BLOOD PRESSURE: 85 MMHG | SYSTOLIC BLOOD PRESSURE: 130 MMHG

## 2024-04-17 DIAGNOSIS — J06.9 UPPER RESPIRATORY TRACT INFECTION, UNSPECIFIED TYPE: Primary | ICD-10-CM

## 2024-04-17 PROCEDURE — 87880 STREP A ASSAY W/OPTIC: CPT | Performed by: NURSE PRACTITIONER

## 2024-04-17 PROCEDURE — 3044F HG A1C LEVEL LT 7.0%: CPT | Performed by: NURSE PRACTITIONER

## 2024-04-17 PROCEDURE — 3075F SYST BP GE 130 - 139MM HG: CPT | Performed by: NURSE PRACTITIONER

## 2024-04-17 PROCEDURE — 3079F DIAST BP 80-89 MM HG: CPT | Performed by: NURSE PRACTITIONER

## 2024-04-17 PROCEDURE — 87428 SARSCOV & INF VIR A&B AG IA: CPT | Performed by: NURSE PRACTITIONER

## 2024-04-17 PROCEDURE — 99214 OFFICE O/P EST MOD 30 MIN: CPT | Performed by: NURSE PRACTITIONER

## 2024-04-17 RX ORDER — DOXYCYCLINE HYCLATE 100 MG/1
100 CAPSULE ORAL 2 TIMES DAILY
Qty: 20 CAPSULE | Refills: 0 | Status: SHIPPED | OUTPATIENT
Start: 2024-04-17

## 2024-04-17 RX ORDER — PREDNISONE 10 MG/1
TABLET ORAL
Qty: 48 TABLET | Refills: 0 | Status: SHIPPED | OUTPATIENT
Start: 2024-04-17

## 2024-04-17 RX ORDER — CEFDINIR 300 MG/1
300 CAPSULE ORAL 2 TIMES DAILY
Qty: 20 CAPSULE | Refills: 0 | Status: SHIPPED | OUTPATIENT
Start: 2024-04-17

## 2024-04-17 NOTE — PROGRESS NOTES
"Chief Complaint  Chief Complaint   Patient presents with    Generalized Body Aches    Sore Throat    Cough    Shortness of Breath        Subjective          Farhat Hein is a 44-year-old male who reports to the office today due to sore throat, cough, and congestion.    Sore throat, cough, congestion- started 1 week ago. He reports he had a sore throat first. He has not been around anyone sick that he is aware of. He [place] [facility] [place] and got steroid pack and azithromycin and still doesn't feel good. Denies fever, chills. He has had body aches and headache. He is coughing and coughing up yellow/greenish sputum. He has tried mucinex at home and helping a little. Denies any facial pressure/pain or ear pain. He has had some shortness of breath.          Review of Systems   Constitutional:  Negative for chills and fever.   HENT:  Positive for congestion and sore throat. Negative for ear pain.    Respiratory:  Positive for cough and shortness of breath.    Cardiovascular:  Negative for chest pain.   Gastrointestinal:  Negative for abdominal pain, nausea and vomiting.   Genitourinary:  Negative for difficulty urinating.   Musculoskeletal:  Positive for myalgias.   Skin: Negative.    Neurological:  Positive for headache. Negative for dizziness and light-headedness.        Objective   Vital Signs:   Vitals:    04/17/24 1150   BP: 130/85   Pulse:    Temp:    SpO2:       Estimated body mass index is 66.27 kg/m² as calculated from the following:    Height as of this encounter: 178.4 cm (70.24\").    Weight as of this encounter: 211 kg (465 lb).                          Physical Exam  Vitals reviewed.   Constitutional:       Appearance: Normal appearance. He is obese. He is ill-appearing.   HENT:      Head: Normocephalic and atraumatic.      Ears:      Comments: Moisture noted bilaterally with yellow drainage and erythema      Nose: Nose normal.      Mouth/Throat:      Mouth: Mucous membranes are moist.      Pharynx: " Oropharynx is clear.   Eyes:      Extraocular Movements: Extraocular movements intact.      Conjunctiva/sclera: Conjunctivae normal.   Cardiovascular:      Rate and Rhythm: Normal rate and regular rhythm.      Pulses: Normal pulses.      Heart sounds: Normal heart sounds.      Comments: S1, S2 audible  Pulmonary:      Effort: Pulmonary effort is normal.      Breath sounds: Normal breath sounds.      Comments: On room air   Diminished breath sounds noted throughout   Abdominal:      General: Abdomen is flat.   Musculoskeletal:         General: Normal range of motion.      Cervical back: Normal range of motion.   Skin:     General: Skin is warm and dry.   Neurological:      General: No focal deficit present.      Mental Status: He is alert and oriented to person, place, and time. Mental status is at baseline.   Psychiatric:         Mood and Affect: Mood normal.         Behavior: Behavior normal.         Thought Content: Thought content normal.         Judgment: Judgment normal.                Physical Exam   Result Review :             Procedures       Assessment and Plan     Diagnoses and all orders for this visit:    1. Upper respiratory tract infection, unspecified type (Primary)  Assessment & Plan:    Sore throat, cough, congestion- started 1 week ago. He reports he had a sore throat first. He has not been around anyone sick that he is aware of. [place] [facility] [place] [place] steroid pack and azithromycin and still doesn't feel good. Denies fever, chills. He has had body aches and headache. He is coughing and coughing up yellow/greenish sputum. He has tried mucinex at home and helping a little. Denies any facial pressure/pain or ear pain. He has had some shortness of breath.     Diminished breath sounds noted on exam, patient reports some wheezing  Ears showed erythema with moisture noted on exam    COVID/Flu negative    Prescribed steroid pack, doxycyline, and omnicef   Encouraged hydration   Continue  mucinex    Orders:  -     POCT SARS-CoV-2 + Flu Antigen IGOR  -     POC Rapid Strep A    Other orders  -     doxycycline (VIBRAMYCIN) 100 MG capsule; Take 1 capsule by mouth 2 (Two) Times a Day.  Dispense: 20 capsule; Refill: 0  -     cefdinir (OMNICEF) 300 MG capsule; Take 1 capsule by mouth 2 (Two) Times a Day.  Dispense: 20 capsule; Refill: 0  -     predniSONE (DELTASONE) 10 MG (48) dose pack; Take per package instructions  Dispense: 48 tablet; Refill: 0              Follow Up   Return for Next scheduled follow up.   Patient was given instructions and counseling regarding her condition or for health maintenance advice. Please see specific information pulled into the AVS if appropriate.

## 2024-04-17 NOTE — ASSESSMENT & PLAN NOTE
Sore throat, cough, congestion- started 1 week ago. He reports he had a sore throat first. He has not been around anyone sick that he is aware of. He went to [facility] [place] a week ago and got steroid pack and azithromycin and still doesn't feel good. Denies fever, chills. He has had body aches and headache. He is coughing and coughing up yellow/greenish sputum. He has tried mucinex at home and helping a little. Denies any facial pressure/pain or ear pain. He has had some shortness of breath.     Diminished breath sounds noted on exam, patient reports some wheezing  Ears showed erythema with moisture noted on exam    COVID/Flu negative    Prescribed steroid pack, doxycyline, and omnicef   Encouraged hydration   Continue mucinex

## 2024-04-18 ENCOUNTER — TELEMEDICINE (OUTPATIENT)
Dept: BARIATRICS/WEIGHT MGMT | Facility: CLINIC | Age: 44
End: 2024-04-18
Payer: MEDICARE

## 2024-04-18 VITALS — HEIGHT: 70 IN | BODY MASS INDEX: 45.1 KG/M2 | WEIGHT: 315 LBS

## 2024-04-18 NOTE — PROGRESS NOTES
Nutrition Services    Patient Name: Farhat Hein  YOB: 1980  MRN: [id number]  Date of Service: 04/18/24      ICD-10-CM ICD-9-CM   1. Body mass index (BMI) 60.0-69.9, adult  Z68.44 V85.44      You have chosen to receive care through a [facility] video visit. Do you consent to use a MyChart video visit for your medical care today? yes      NUTRITION ASSESSMENT - BARIATRIC SURGERY      Reason for Visit MWM-4     H&P      Past Medical History:   Diagnosis Date    Allergic 1980    seasonal    Arthritis 01/05/2020    legs    Asthma 01/2024    Chronic obstructive pulmonary disease 07/17/2023    COPD (chronic obstructive pulmonary disease) 04/01/2011    GERD (gastroesophageal reflux disease) 06/06/2006    Hypertension 06/06/2006    Kidney stone 08/08/2010    Low back pain 01/01/2020    Obesity 01/21/1997    Pneumonia 11/2023    Renal insufficiency 03/2021    Stroke 01/21/2015 01/21/2016    Substance abuse     Type 2 diabetes mellitus 07/27/2019    Urinary tract infection 03/15/2020       Past Surgical History:   Procedure Laterality Date    APPENDECTOMY  03/2017    CHOLECYSTECTOMY  02/2016        Previous Goals   Feb 2024 Patient to continue balanced meals with protein, color and carbs - working on  Patient to start exercise, working with  - working on   Patient to continue water intake        Encounter Information        Visit Narrative     Patient is on Ozempic. Patient states that medicine is working well with no side effects. Patient is waiting on  to get in touch with him to start exercising. Patient does have limitations with walking. Patient is open to adding in exercises that he can do while sitting.  Patient is open to low carb options and discussed specific brands and choices to purchase in order to allow him to eat more volume with less carbs.     Diet Recall:   Breakfast: pierce, eggs, toast  Lunch: 2 turkey sandwiches with vegetables and fruit  Dinner: meat and  "veg  Snacks: peanut butter crackers, popcorn, celery or fruit  Beverages: water -  oz daily, coffee, tea, occasionally a diet pepsi    Exercise: waiting on     Supplements: multivitamin daily, vitamin D3    Self Monitoring:          Anthropometrics        Current Height, Weight Height: 178.4 cm (70.24\")  Weight: (!) 209 kg (460 lb) (04/18/24 1419)       Trending Weight Hx  11 pounds weight loss in the last month    Wt Readings from Last 30 Encounters:   04/18/24 1419 (!) 209 kg (460 lb)   04/17/24 1123 (!) 211 kg (465 lb)   04/08/24 1031 (!) 211 kg (465 lb)   04/05/24 1257 (!) 211 kg (465 lb)   03/22/24 1238 (!) 212 kg (467 lb)   03/13/24 1251 (!) 214 kg (471 lb)   02/19/24 1245 (!) 215 kg (473 lb 14.4 oz)   01/29/24 1014 (!) 216 kg (476 lb)   01/25/24 0856 (!) 215 kg (473 lb)   12/26/23 1450 (!) 215 kg (474 lb)   12/22/23 0823 (!) 215 kg (474 lb)   12/19/23 1343 (!) 209 kg (461 lb)   12/13/23 1450 (!) 209 kg (461 lb)   11/28/23 1042 (!) 209 kg (461 lb)   11/09/23 2136 (!) 200 kg (440 lb)   11/03/23 1415 (!) 205 kg (451 lb 12.8 oz)   09/21/23 0900 (!) 205 kg (451 lb 12.8 oz)   07/18/23 1301 (!) 207 kg (457 lb)   04/03/17 1354 (!) 195 kg (430 lb 15.9 oz)   03/02/17 0722 (!) 195 kg (430 lb 15.9 oz)   11/15/16 1105 (!) 195 kg (430 lb 15.9 oz)      BMI kg/m2 Body mass index is 65.56 kg/m².       Nutrition Diagnosis         Nutrition Dx Statement Overweight/obesity RT multifactorial biochemical, behavioral and environmental contributors to disease AEB BMI 65.56 kg/m^2.            Nutrition Intervention         Nutrition Intervention Nutrition education related to diet modification and physical activity          Monitor/Evaluation        New Goals Patient will switch to low calorie bread to decrease carb intake.   Patient will begin to add in chair or arm exercises now due to walking limitations.          Total time spent with pt 15 minutes of which 15 minutes were spent on education. "       Electronically signed by:  Nunu Mendiola RD  04/18/24 14:20 EDT

## 2024-05-06 RX ORDER — HYDRALAZINE HYDROCHLORIDE 25 MG/1
25 TABLET, FILM COATED ORAL 3 TIMES DAILY
Qty: 90 TABLET | Refills: 0 | Status: SHIPPED | OUTPATIENT
Start: 2024-05-06

## 2024-05-11 ENCOUNTER — TELEMEDICINE (OUTPATIENT)
Dept: FAMILY MEDICINE CLINIC | Facility: TELEHEALTH | Age: 44
End: 2024-05-11
Payer: MEDICARE

## 2024-05-11 VITALS — TEMPERATURE: 99.6 F | BODY MASS INDEX: 45.1 KG/M2 | WEIGHT: 315 LBS | HEIGHT: 70 IN

## 2024-05-11 DIAGNOSIS — J40 BRONCHITIS: Primary | ICD-10-CM

## 2024-05-11 RX ORDER — GUAIFENESIN 600 MG/1
600 TABLET, EXTENDED RELEASE ORAL 2 TIMES DAILY
Qty: 28 TABLET | Refills: 0 | Status: SHIPPED | OUTPATIENT
Start: 2024-05-11 | End: 2024-05-25

## 2024-05-11 RX ORDER — PREDNISONE 20 MG/1
TABLET ORAL
Qty: 20 TABLET | Refills: 0 | Status: SHIPPED | OUTPATIENT
Start: 2024-05-11

## 2024-05-23 RX ORDER — HYDROCHLOROTHIAZIDE 25 MG/1
25 TABLET ORAL DAILY
Qty: 60 TABLET | Refills: 1 | Status: SHIPPED | OUTPATIENT
Start: 2024-05-23

## 2024-05-23 NOTE — TELEPHONE ENCOUNTER
Rx Refill Note  Requested Prescriptions     Pending Prescriptions Disp Refills    hydroCHLOROthiazide 25 MG tablet [Pharmacy Med Name: hydroCHLOROthiazide 25 MG TABLET] 60 tablet      Sig: TAKE 1 TABLET BY MOUTH DAILY      Last office visit with prescribing clinician: 4/17/2024   Last telemedicine visit with prescribing clinician: Visit date not found   Next office visit with prescribing clinician: 6/5/2024                         Would you like a call back once the refill request has been completed: [] Yes [] No    If the office needs to give you a call back, can they leave a voicemail: [] Yes [] No    Danya Rosales CMA  05/23/24, 10:09 EDT

## 2024-05-24 ENCOUNTER — TELEMEDICINE (OUTPATIENT)
Dept: BARIATRICS/WEIGHT MGMT | Facility: CLINIC | Age: 44
End: 2024-05-24
Payer: MEDICARE

## 2024-05-24 VITALS — WEIGHT: 315 LBS | BODY MASS INDEX: 45.1 KG/M2 | HEIGHT: 70 IN

## 2024-05-24 DIAGNOSIS — E66.01 CLASS 3 OBESITY: Primary | ICD-10-CM

## 2024-05-24 DIAGNOSIS — Z79.899 MEDICATION MANAGEMENT: ICD-10-CM

## 2024-05-24 DIAGNOSIS — E11.59 TYPE 2 DIABETES MELLITUS WITH OTHER CIRCULATORY COMPLICATION, UNSPECIFIED WHETHER LONG TERM INSULIN USE: ICD-10-CM

## 2024-05-24 NOTE — PROGRESS NOTES
[facility] BAR SURG [facility] BARIATRIC SURGERY  [address] [place] [address]  [address] [place] [address]  Dept: [phone]  5/24/2024      Farhat Hein.  [id number]  [id number]  1980  male    You have chosen to receive care through a video visit. Do you consent to use a video visit for your medical care today? Yes       Current weight: 454 pounds     The patient is here for month 5 of medical weight management. He had a loss of 6 lbs. The patient states that they are following the recommendations given by our office and dietician including a high lean protein, low carb and low fat diet. We recommended adequate fruits and vegetable intake along with limited portion sizes. Patient is working on eliminating fast foods, fried foods, sweets and soda. Farhat Hein has been increasing his daily water intake. He has been exercising: walking more, looking into a .  Wt Readings from Last 10 Encounters:   05/11/24 (!) 209 kg (460 lb)   04/18/24 (!) 209 kg (460 lb)   04/17/24 (!) 211 kg (465 lb)   04/08/24 (!) 211 kg (465 lb)   04/05/24 (!) 211 kg (465 lb)   03/22/24 (!) 212 kg (467 lb)   03/13/24 (!) 214 kg (471 lb)   02/19/24 (!) 215 kg (473 lb 14.4 oz)   01/29/24 (!) 216 kg (476 lb)   01/25/24 (!) 215 kg (473 lb)     Patient states they have made positive changes including increased protein and walking  The patient admits to be struggling with none    Review of Systems   Constitutional:  Positive for activity change, appetite change and fatigue.   Respiratory: Negative.     Cardiovascular: Negative.    Gastrointestinal:  Positive for constipation.   Musculoskeletal:  Positive for back pain.   Skin:         Open leg sores/ wounds       Current dose is 2 mg ozempic weekly  Denies nausea/ vomiting or abdominal pain, some constipation but controlled  Helping with hunger/ snacking,  blood sugar control     Breakfast: eggs, some kind of meat, and  whole wheat toast, coffee with 1 % milk  Lunch: wrap, sandwich , few chips , apple/ orange   Dinner: meat, veggies   Snacks: popcorn, peanut butter and celery , apple and peanut butter, walnuts   Drinks: coffee, 1 diet pepsi a week, 64-84 oz of water a day  Exercise : more walking , has not gotten  yet         The following portions of the patient's history were reviewed and updated as appropriate: active problem list, medication list, allergies, social history, notes from last encounter  Past Medical History:   Diagnosis Date    Allergic 1980    seasonal    Arthritis 01/05/2020    legs    Asthma 01/2024    Chronic obstructive pulmonary disease 07/17/2023    COPD (chronic obstructive pulmonary disease) 04/01/2011    GERD (gastroesophageal reflux disease) 06/06/2006    Hypertension 06/06/2006    Kidney stone 08/08/2010    Low back pain 01/01/2020    Obesity 01/21/1997    Pneumonia 11/2023    Renal insufficiency 03/2021    Stroke 01/21/2015 01/21/2016    Substance abuse     Type 2 diabetes mellitus 07/27/2019    Urinary tract infection 03/15/2020     Past Surgical History:   Procedure Laterality Date    APPENDECTOMY  03/2017    CHOLECYSTECTOMY  02/2016        Physical Exam  Constitutional:       Appearance: Normal appearance. He is obese.   Cardiovascular:      Comments: Unable to assess due to video visit   Pulmonary:      Comments: Unable to assess due to video visit   Abdominal:      Comments: Unable to assess due to video visit    Neurological:      General: No focal deficit present.      Mental Status: He is alert and oriented to person, place, and time.   Psychiatric:         Mood and Affect: Mood normal.         Behavior: Behavior normal.         Thought Content: Thought content normal.         Judgment: Judgment normal.         Discussion/Plan:  BMI 66.12 , Class 3 obesity, medication management: ozempic    Obesity/Morbid Obesity: Currently the patient's weight is decreased. Treatment  plan includes prescribed diet, prescribed exercise regimen and behavior modification.     Medication options for weight loss were discussed with the pt and based upon the patient's history and insurance , ozempic 2 mg will be prescribed/ continued. Pt has completed 4 months of this medication and is on the 2 mg dosage. I did speak with the pt about possibly switching to mounjaro if needed at some point. Also spoke with the pt about bariatric surgery and he would like to continue with non surgery options for right now. Pt also has a CT scan ordered to rule out hernia and states he would still like to complete this but has been having trouble with open wounds on his legs healing and that is why he has postponed this testing.       Pt  denies any hx of multiple endocrine neoplasia type 2 or medullary thyroid cancer for herself or her immediate family with GLP-1's. Pt encouraged to call the office with any nausea/ vomiting or abdominal pain with GLP-1's. Pt also informed constipation can happen with these medications due to slowing of gut. Pt encouraged to take a stool softener/ laxative if constipation occurs.     Will refill ozempic 2 mg  for pt today.     I reviewed the appropriate dietary choices with the patient and encouraged the necessary changes. Recommended at least 70 grams of protein per day, around 35 grams of fats and less than 100 grams of carbohydrates. Reviewed calorie intake if patient wanted to calorie count and/or had BMR. Instructed patient to drink half of body weight in ounces per day and exercise a minimum of 150 minutes per week including both cardio and strength training. Discussed the option of keeping a food journal which will help patient become more aware of the nutritional value of foods .     The patient was given written materials from our office for diet plans and medications.   I answered all of the patients questions regarding dietary changes, exercise, and medications.   The patient  will follow up in 1 month. The total time spent face to face was 20 minutes with 15 minutes spent counseling.      Penny Rueda APRFRANCIS  [facility]ariat[facility]

## 2024-06-03 RX ORDER — HYDRALAZINE HYDROCHLORIDE 25 MG/1
25 TABLET, FILM COATED ORAL 3 TIMES DAILY
Qty: 90 TABLET | Refills: 0 | Status: SHIPPED | OUTPATIENT
Start: 2024-06-03

## 2024-06-24 RX ORDER — ROPINIROLE 0.25 MG/1
TABLET, FILM COATED ORAL
Qty: 90 TABLET | Refills: 0 | Status: SHIPPED | OUTPATIENT
Start: 2024-06-24

## 2024-07-05 ENCOUNTER — HOSPITAL ENCOUNTER (OUTPATIENT)
Facility: HOSPITAL | Age: 44
Discharge: HOME | End: 2024-07-05
Attending: EMERGENCY MEDICINE | Admitting: EMERGENCY MEDICINE
Payer: MEDICARE

## 2024-07-05 VITALS
WEIGHT: 315 LBS | TEMPERATURE: 97.9 F | OXYGEN SATURATION: 99 % | HEIGHT: 71 IN | HEART RATE: 88 BPM | RESPIRATION RATE: 16 BRPM | DIASTOLIC BLOOD PRESSURE: 99 MMHG | SYSTOLIC BLOOD PRESSURE: 166 MMHG | BODY MASS INDEX: 44.1 KG/M2

## 2024-07-05 DIAGNOSIS — R31.0 GROSS HEMATURIA: Primary | ICD-10-CM

## 2024-07-05 LAB
BILIRUB UR QL STRIP: ABNORMAL
CLARITY UR: ABNORMAL
COLOR UR: ABNORMAL
GLUCOSE UR STRIP-MCNC: NEGATIVE MG/DL
HGB UR QL STRIP.AUTO: ABNORMAL
KETONES UR QL STRIP: ABNORMAL
LEUKOCYTE ESTERASE UR QL STRIP.AUTO: ABNORMAL
NITRITE UR QL STRIP: NEGATIVE
PH UR STRIP.AUTO: 5 [PH] (ref 5–8)
PROT UR QL STRIP: ABNORMAL
SP GR UR STRIP: 1.02 (ref 1–1.03)
UROBILINOGEN UR QL STRIP: ABNORMAL

## 2024-07-05 PROCEDURE — G0463 HOSPITAL OUTPT CLINIC VISIT: HCPCS

## 2024-07-05 PROCEDURE — 63710000001 ONDANSETRON ODT 4 MG TABLET DISPERSIBLE

## 2024-07-05 PROCEDURE — 81001 URINALYSIS AUTO W/SCOPE: CPT

## 2024-07-05 PROCEDURE — 87086 URINE CULTURE/COLONY COUNT: CPT

## 2024-07-05 PROCEDURE — 25010000002 KETOROLAC TROMETHAMINE PER 15 MG

## 2024-07-05 PROCEDURE — 99214 OFFICE O/P EST MOD 30 MIN: CPT

## 2024-07-05 RX ORDER — CEPHALEXIN 500 MG/1
500 CAPSULE ORAL 2 TIMES DAILY
Qty: 14 CAPSULE | Refills: 0 | Status: SHIPPED | OUTPATIENT
Start: 2024-07-05 | End: 2024-07-12

## 2024-07-05 RX ORDER — KETOROLAC TROMETHAMINE 30 MG/ML
30 INJECTION, SOLUTION INTRAMUSCULAR; INTRAVENOUS ONCE
Status: COMPLETED | OUTPATIENT
Start: 2024-07-05 | End: 2024-07-05

## 2024-07-05 RX ORDER — ONDANSETRON 4 MG/1
4 TABLET, ORALLY DISINTEGRATING ORAL ONCE
Status: COMPLETED | OUTPATIENT
Start: 2024-07-05 | End: 2024-07-05

## 2024-07-05 RX ORDER — TAMSULOSIN HYDROCHLORIDE 0.4 MG/1
1 CAPSULE ORAL DAILY
Qty: 30 CAPSULE | Refills: 0 | Status: SHIPPED | OUTPATIENT
Start: 2024-07-05

## 2024-07-05 RX ADMIN — KETOROLAC TROMETHAMINE 30 MG: 30 INJECTION, SOLUTION INTRAMUSCULAR at 14:42

## 2024-07-05 RX ADMIN — ONDANSETRON 4 MG: 4 TABLET, ORALLY DISINTEGRATING ORAL at 14:42

## 2024-07-05 NOTE — Clinical Note
[place] [id number] [place] [address] [place] [address]  Phone: [phone]    Quynh Hein accompanied Farhat Hein to the emergency department on 7/5/2024. They may return to work on 07/06/2024.        Thank you for choosing [facility].    Eitan Manley MD

## 2024-07-05 NOTE — FSED PROVIDER NOTE
Subjective   History of Present Illness  44-year-old male reports that yesterday he was urinating without any distress send this morning felt like he could not urinate but for a few drips.  He reports that prior to coming to the ER he had a sudden output of urine.  He is currently reporting pressure to his bladder.  He does report that he has a history of kidney stones and has been able to pass kidney stones without needing lithotripsy.  He is denying any fever flank pain he is denying any chest pain or shortness of breath.  He reports he had a normal bowel movement this morning.  He is denying any lesions on his penis or scrotum he is denying any discharge from his penis.  He denies starting any new medications within the last 2 to 3 weeks.        Review of Systems   All other systems reviewed and are negative.      Past Medical History:   Diagnosis Date    Allergic 1979    seasonal    Arthritis 01/05/2020    legs    Asthma 01/2024    Chronic obstructive pulmonary disease 07/17/2023    COPD (chronic obstructive pulmonary disease) 04/01/2011    GERD (gastroesophageal reflux disease) 06/06/2006    Hypertension 06/06/2006    Kidney stone 08/08/2010    Low back pain 01/01/2020    Obesity 01/21/1997    Pneumonia 11/2023    Renal insufficiency 03/2021    Stroke 01/21/2015 01/21/2016    Substance abuse     Type 2 diabetes mellitus 07/27/2019    Urinary tract infection 03/15/2020       Allergies   Allergen Reactions    Promethazine Itching    Vancomycin Anaphylaxis    Calamine-Zinc Oxide Itching and Rash       Past Surgical History:   Procedure Laterality Date    APPENDECTOMY  03/2017    CHOLECYSTECTOMY  02/2016       Family History   Problem Relation Age of Onset    Arthritis Mother     COPD Mother     Diabetes Mother     Hyperlipidemia Mother     Hypertension Mother     Alcohol abuse Father     Arthritis Father     Cancer Father         SKIN    Diabetes Father     Heart disease Father     Hyperlipidemia Father      Hypertension Father     Obesity Father     Arthritis Sister     Cancer Sister         BREAST    Diabetes Sister     Hyperlipidemia Sister     Heart disease Sister     Hypertension Sister     Obesity Sister     Sleep apnea Sister     Arthritis Brother     Cancer Brother         KIDNEY/BRAIN/SKIN    Hyperlipidemia Brother     Kidney disease Brother     Hypertension Brother     Alcohol abuse Brother     Mental illness Brother         schizophrenia    Cancer Sister         COLON    Diabetes Sister             Hyperlipidemia Sister     Hypertension Sister     Diabetes Brother     Heart disease Brother     Hyperlipidemia Brother     Hypertension Brother     Alcohol abuse Brother     Cancer Maternal Grandmother     Cancer Maternal Uncle        Social History     Socioeconomic History    Marital status:    Tobacco Use    Smoking status: Former     Current packs/day: 0.00     Average packs/day: 2.0 packs/day for 10.4 years (20.8 ttl pk-yrs)     Types: Cigarettes     Start date: 1998     Quit date: 2008     Years since quittin.0     Passive exposure: Past    Smokeless tobacco: Never   Vaping Use    Vaping status: Never Used   Substance and Sexual Activity    Alcohol use: Not Currently     Alcohol/week: 12.0 standard drinks of alcohol     Types: 12 Cans of beer per week     Comment: PER DAY    Drug use: Yes     Frequency: 7.0 times per week     Types: Marijuana    Sexual activity: Yes     Partners: Female     Birth control/protection: Condom           Objective   Physical Exam  Vitals and nursing note reviewed.   Constitutional:       Appearance: He is obese.   HENT:      Head: Atraumatic.      Mouth/Throat:      Mouth: Mucous membranes are moist.      Pharynx: Oropharynx is clear.   Eyes:      Extraocular Movements: Extraocular movements intact.   Cardiovascular:      Rate and Rhythm: Normal rate.      Pulses: Normal pulses.   Abdominal:      General: Abdomen is flat.      Palpations: Abdomen  is soft.      Comments: Patient is morbidly obese his abdomen is quite large however I do not perceive any rigidity or rebound tenderness.  Bladder appears distended   Musculoskeletal:         General: Normal range of motion.      Cervical back: Normal range of motion and neck supple.   Skin:     General: Skin is warm and dry.   Neurological:      Mental Status: He is alert.         Procedures           ED Course  ED Course as of 07/05/24 1640   Fri Jul 05, 2024   1510 Urinalysis shows large amount of blood [WF]      ED Course User Index  [WF] Leno Morgan Jr., SAMI                                           Medical Decision Making  Begin with bladder scan to assess possibility of urinary retention.  Patient indicates he urinated prior to arrival at ER.  Will also obtain UA to assess for blood infection.  Patient is mildly nauseated will give p.o. Zofran.    Patient is hypertensive, vital signs otherwise are within normal limits.    Told by nursing that bladder scan is negative for any sign of urinary retention.  Patient asking for pain medication will give an injection of Toradol    Reassessment: Patient is up ambulating in ER he is urinated twice.  He reports decreasing pain after doing Toradol.  We discussed possibility that he may be's passing kidney stone.  Again he is denying fever and chills.  Explained to the patient limitations of not having a CT scan here today.  I have offered transfer to another facility or continued observation or a full workup including blood work however the patient feels comfortable going home at this time.    We discussed sending urine for culture discharging patient home on Keflex as prophylaxis, will also discharge home on Flomax.  Strict return precautions were presented to the patient including inability to urinate return of pain we discussed the value and presenting to the nearest ER or patient could obtain blood work and CT scan.    Patient is agreeable to plan of care  and is requesting discharge at this time.    Problems Addressed:  Gross hematuria: complicated acute illness or injury    Risk  Prescription drug management.        Final diagnoses:   Gross hematuria       ED Disposition  ED Disposition       ED Disposition   Discharge    Condition   Stable    Comment   --               Flakita Mis Z, APRN  [address]  [address]  [place] [address]  [phone]          [facility] - [place] [address] [facility] [address]  [phone]             Medication List        New Prescriptions      cephalexin 500 MG capsule  Commonly known as: KEFLEX  Take 1 capsule by mouth 2 (Two) Times a Day for 7 days.     tamsulosin 0.4 MG capsule 24 hr capsule  Commonly known as: FLOMAX  Take 1 capsule by mouth Daily.               Where to Get Your Medications        These medications were sent to [facility] [id number] - [place], [place] - [address] [place] [address] [address]  - [phone]  - [phone] [place] [address], [place] [address]      Phone: [phone]   cephalexin 500 MG capsule  tamsulosin 0.4 MG capsule 24 hr capsule

## 2024-07-05 NOTE — DISCHARGE INSTRUCTIONS
Take Tylenol and ibuprofen as needed for pain alternating every 4-6 hours.    You will be contacted with the results of urine culture if you need to be placed on a different antibiotic    Take Keflex for prevention of infection    Increase your fluid intake with water    Flomax to help with symptoms of urinary retention    [facility] with [facility] within the next week    If you have symptoms of urinary retention difficulty urinating pain with urination flank pain fever or any worsening symptoms while taking Keflex present to the nearest ER.    Follow-up with family doctor as needed

## 2024-07-05 NOTE — ED NOTES
Pt reports he attempted to provide urine sample out in lobby and had an accident. Pt will attempt to provide sample again soon.

## 2024-07-06 LAB
BACTERIA UR QL AUTO: ABNORMAL /HPF
HYALINE CASTS UR QL AUTO: ABNORMAL /LPF
RBC # UR STRIP: ABNORMAL /HPF
REF LAB TEST METHOD: ABNORMAL
SQUAMOUS #/AREA URNS HPF: ABNORMAL /HPF
WBC # UR STRIP: ABNORMAL /HPF

## 2024-07-07 LAB — BACTERIA SPEC AEROBE CULT: NO GROWTH

## 2024-07-08 RX ORDER — HYDRALAZINE HYDROCHLORIDE 25 MG/1
25 TABLET, FILM COATED ORAL 3 TIMES DAILY
Qty: 90 TABLET | Refills: 0 | Status: SHIPPED | OUTPATIENT
Start: 2024-07-08

## 2024-08-05 RX ORDER — HYDRALAZINE HYDROCHLORIDE 25 MG/1
25 TABLET, FILM COATED ORAL 3 TIMES DAILY
Qty: 90 TABLET | Refills: 0 | Status: SHIPPED | OUTPATIENT
Start: 2024-08-05

## 2024-08-05 NOTE — TELEPHONE ENCOUNTER
Rx Refill Note  Requested Prescriptions     Pending Prescriptions Disp Refills    hydrALAZINE (APRESOLINE) 25 MG tablet [Pharmacy Med Name: hydrALAZINE 25 MG TABLET] 90 tablet 0     Sig: TAKE 1 TABLET BY MOUTH 3 TIMES A DAY      Last office visit with prescribing clinician: 4/17/2024   Last telemedicine visit with prescribing clinician: Visit date not found   Next office visit with prescribing clinician: 8/13/2024                         Would you like a call back once the refill request has been completed: [] Yes [] No    If the office needs to give you a call back, can they leave a voicemail: [] Yes [] No    Danya Rosales, ROSE  08/05/24, 11:19 EDT

## 2024-08-09 ENCOUNTER — TELEPHONE (OUTPATIENT)
Dept: BARIATRICS/WEIGHT MGMT | Facility: CLINIC | Age: 44
End: 2024-08-09
Payer: MEDICARE

## 2024-08-12 PROBLEM — J06.9 URI (UPPER RESPIRATORY INFECTION): Status: RESOLVED | Noted: 2024-04-17 | Resolved: 2024-08-12

## 2024-08-12 PROBLEM — J20.9 ACUTE BRONCHITIS: Status: RESOLVED | Noted: 2023-12-26 | Resolved: 2024-08-12

## 2024-08-12 PROBLEM — L97.919 VARICOSE VEINS OF LOWER EXTREMITY WITH ULCER AND INFLAMMATION: Status: ACTIVE | Noted: 2024-02-07

## 2024-08-12 PROBLEM — M25.473 ANKLE EDEMA: Status: ACTIVE | Noted: 2024-02-06

## 2024-08-12 PROBLEM — L97.909 VARICOSE VEINS OF LOWER EXTREMITY WITH ULCER AND INFLAMMATION: Status: ACTIVE | Noted: 2024-02-07

## 2024-08-12 PROBLEM — I83.219 VARICOSE VEINS OF LOWER EXTREMITY WITH ULCER AND INFLAMMATION: Status: ACTIVE | Noted: 2024-02-07

## 2024-08-12 PROBLEM — L97.929 VARICOSE VEINS OF LOWER EXTREMITY WITH ULCER AND INFLAMMATION: Status: ACTIVE | Noted: 2024-02-07

## 2024-08-12 PROBLEM — I83.209 VARICOSE VEINS OF LOWER EXTREMITY WITH ULCER AND INFLAMMATION: Status: ACTIVE | Noted: 2024-02-07

## 2024-08-12 PROBLEM — I83.229 VARICOSE VEINS OF LOWER EXTREMITY WITH ULCER AND INFLAMMATION: Status: ACTIVE | Noted: 2024-02-07

## 2024-08-12 PROBLEM — I83.813 PAIN DUE TO VARICOSE VEINS OF BOTH LOWER EXTREMITIES: Status: ACTIVE | Noted: 2024-02-06

## 2024-08-21 ENCOUNTER — TELEMEDICINE (OUTPATIENT)
Dept: FAMILY MEDICINE CLINIC | Facility: TELEHEALTH | Age: 44
End: 2024-08-21
Payer: MEDICARE

## 2024-08-21 DIAGNOSIS — R11.2 NAUSEA AND VOMITING, UNSPECIFIED VOMITING TYPE: Primary | ICD-10-CM

## 2024-08-21 DIAGNOSIS — U07.1 COVID-19 VIRUS INFECTION: ICD-10-CM

## 2024-08-21 RX ORDER — ONDANSETRON 8 MG/1
8 TABLET, ORALLY DISINTEGRATING ORAL EVERY 8 HOURS PRN
Qty: 21 TABLET | Refills: 0 | Status: SHIPPED | OUTPATIENT
Start: 2024-08-21

## 2024-08-21 RX ORDER — BROMPHENIRAMINE MALEATE, PSEUDOEPHEDRINE HYDROCHLORIDE, AND DEXTROMETHORPHAN HYDROBROMIDE 2; 30; 10 MG/5ML; MG/5ML; MG/5ML
10 SYRUP ORAL 4 TIMES DAILY PRN
Qty: 200 ML | Refills: 0 | Status: SHIPPED | OUTPATIENT
Start: 2024-08-21

## 2024-08-21 RX ORDER — ALBUTEROL SULFATE 90 UG/1
2 AEROSOL, METERED RESPIRATORY (INHALATION) EVERY 4 HOURS PRN
Qty: 18 G | Refills: 0 | Status: SHIPPED | OUTPATIENT
Start: 2024-08-21

## 2024-08-21 NOTE — PATIENT INSTRUCTIONS
Fact Sheet for Patients and Caregivers    Emergency Use Authorization (EUA) Of LAGEVRIO™ (molnupiravir) capsules For   Coronavirus Disease 2019 (COVID-19)    What is the most important information I should know about LAGEVRIO?  LAGEVRIO may cause serious side effects, including:     LAGEVRIO may cause harm to your unborn baby. It is not known if LAGEVRIO will   harm your baby if you take LAGEVRIO during pregnancy.  o LAGEVRIO is not recommended for use in pregnancy.   o LAGEVRIO has not been studied in pregnancy. LAGEVRIO was studied in pregnant   animals only. When LAGEVRIO was given to pregnant animals, LAGEVRIO caused   harm to their unborn babies.  o You and your healthcare provider may decide that you should take LAGEVRIO during   pregnancy if there are no other COVID-19 treatment options approved or authorized by   the FDA that are accessible or clinically appropriate for you.  o If you and your healthcare provider decide that you should take LAGEVRIO during   pregnancy, you and your healthcare provider should discuss the known and potential   benefits and the potential risks of taking LAGEVRIO during pregnancy.  For individuals who are able to become pregnant:     You should use a reliable method of birth control (contraception) correctly and consistently   during treatment with LAGEVRIO and for 4 days after the last dose of LAGEVRIO. Talk to   your healthcare provider about reliable birth control methods.     Before starting treatment with LAGEVRIO your healthcare provider may do a pregnancy test   to see if you are pregnant before starting treatment with LAGEVRIO.     Tell your healthcare provider right away if you become pregnant or think you may be   pregnant during treatment with LAGEVRIO.    Pregnancy Registry:   There is a pregnancy registry for individuals who take LAGEVRIO during pregnancy. The   purpose of this program is to collect information about the health of you and your baby.    If you are  pregnant or become pregnant during treatment with LAGEVRIO, you are   encouraged to report your use of LAGEVRIO during pregnancy to this pregnancy registry at   https://covid-pr.[employer] or 1-347.152.1237.    For individuals who are sexually active with partners who are able to become pregnant:   It is not known if LAGEVRIO can affect sperm. While the risk is regarded as low, animal   studies to fully assess the potential for LAGEVRIO to affect the babies of males treated with   LAGEVRIO have not been completed. A reliable method of birth control (contraception)   should be used consistently and correctly during treatment with LAGEVRIO and for at least   3 months after the last dose. The risk to sperm beyond 3 months is not known. Studies to   understand the risk to sperm beyond 3 months are ongoing. Talk to your healthcare provider   about reliable birth control methods. Talk to your healthcare provider if you have questions   or concerns about how LAGEVRIO may affect sperm.    You are being given this fact sheet because your healthcare provider believes it is necessary to   provide you with LAGEVRIO for the treatment of adults with mild-to-moderate coronavirus   disease 2019 (COVID-19) who are at high risk for progression to severe COVID-19, including   hospitalization or death, and for whom other COVID-19 treatment options approved or   authorized by the FDA are not accessible or clinically appropriate.     The U.S. Food and Drug Administration (FDA) has issued an Emergency Use Authorization   (EUA) to make LAGEVRIO available during the COVID-19 pandemic (for more details about an   EUA please see “What is an Emergency Use Authorization?” at the end of this document).   LAGEVRIO is not an FDA-approved medicine in the [place]. Read this Fact Sheet for   information about LAGEVRIO. Talk to your healthcare provider about your options if you have   any questions. It is your choice to take LAGEVRIO.    What  is COVID-19?  COVID-19 is caused by a virus called a coronavirus. You can get COVID-19 through close   contact with another person who has the virus.  COVID-19 illnesses have ranged from very mild-to-severe, including illness resulting in death.   While information so far suggests that most COVID-19 illness is mild, serious illness can   happen and may cause some of your other medical conditions to become worse. Older people   and people of all ages with severe, long lasting (chronic) medical conditions like heart disease,   lung disease and diabetes, for example seem to be at higher risk of being hospitalized for   COVID-19.    What is LAGEVRIO?  LAGEVRIO is an investigational medicine used to treat adults with mild to moderate COVID-19:   who are at high risk for progression to severe COVID-19 including hospitalization or death,   and for    whom other COVID-19 treatment options approved or authorized by the FDA are not   accessible or clinically appropriate.  The FDA has authorized the emergency use of LAGEVRIO for the treatment of mild-tomoderate COVID-19 in adults under an EUA. For more information on EUA, see the “What is   an Emergency Use Authorization (EUA)?” section at the end of this Fact Sheet.   LAGEVRIO is not authorized:   for use in people less than 18 years of age.    for prevention of COVID-19.   for people needing hospitalization for COVID-19.   for use for longer than 5 consecutive days.  What should I tell my healthcare provider before I take LAGEVRIO?  Tell your healthcare provider if you:   have any allergies   are breastfeeding or plan to breastfeed   have any serious illnesses   take any medicines including prescription, over-the-counter medicines, vitamins, and   herbal products.   How do I take LAGEVRIO?   Take LAGEVRIO exactly as your healthcare provider tells you to take it.   Take 4 capsules of LAGEVRIO every 12 hours (for example, at 8 am and at 8 pm)   Take LAGEVRIO for 5 days. It is  important that you complete the full 5 days of   treatment with LAGEVRIO. Do not stop taking LAGEVRIO before you complete the full 5   days of treatment, even if you feel better.   Take LAGEVRIO with or without food.   You should stay in isolation for as long as your healthcare provider tells you to. Talk to   your healthcare provider if you are not sure about how to properly isolate while you have   COVID-19.   Swallow LAGEVRIO capsules whole. Do not open, break, or crush the capsules. If you   cannot swallow capsules whole, tell your healthcare provider.   If your healthcare provider prescribes LAGEVRIO and tells you to take or give a dose   through a nasogastric (NG) or orogastric (OG) tube, follow the instructions below: “How   to take or give a dose of LAGEVRIO through a nasogastric (NG) or orogastric (OG)  feeding tube.” You must have an NG or OG that is size 12 Dutch (FR) or larger.   If you miss a dose of LAGEVRIO:  o If it has been less than 10 hours since the missed dose, take it as soon as you   remember  o If it has been more than 10 hours since the missed dose, skip the missed dose   and take your dose at the next scheduled time.   Do not double the dose of LAGEVRIO to make up for a missed dose.  How to take or give a dose of LAGEVRIO through a nasogastric (NG) or orogastric   (OG) feeding tube:    Wash your hands well with soap and water.   Gather the supplies you will need to take or give the prescribed dose of LAGEVRIO.   o 4 LAGEVRIO capsules  o 1 liquid measuring cup with mL markings to measure 40 mL of room temperature   water  o 1 clean container with a lid  o 1 catheter tip syringe. Your healthcare provider should tell you what size catheter   tip syringe you will need to take or give a dose of LAGEVRIO.   Place the needed supplies on a clean work surface.   Follow your healthcare provider’s instructions on how to flush the NG or OG feeding   tube. Flush the NG or OG feeding tube with 5 mL of  water before taking or giving a dose   of LAGEVRIO.   Carefully open 4 LAGEVRIO capsules, one at a time, and empty the contents into a   clean container.   Use the liquid measuring cup to measure 40 mL of room temperature water and add to   the container containing the capsule contents.    Place the lid on the container. Shake to mix the capsule contents and water well for 3   minutes. The capsule contents may not dissolve completely.   Remove the lid from the container and draw up all the LAGEVRIO and water mixture into   a catheter tip syringe.   Give all of the mixture right away through the NG or OG feeding tube. Do not keep the   mixture for future use.    If any capsule contents are left in the container:   o Add 10 mL of water to the container, and mix to loosen any capsule contents that   are left in the container.   o Use the catheter tip syringe to draw up all of the mixture in the container.   o Give the mixture through the NG or OG feeding tube.   o Repeat this process as needed until you no longer see any capsule contents left   in the container or catheter tip syringe.   Use the same catheter tip syringe to flush the NG or OG feeding tube 2 times with 5 mL   of water (10mL total).   Rinse the container, lid and catheter tip syringe well with clean water after use. Place on   a clean paper towel until next use.   What are the important possible side effects of LAGEVRIO?   See, “What is the most important information I should know about LAGEVRIO?”     Allergic Reactions. Allergic reactions can happen in people taking LAGEVRIO, even   after only 1 dose. Stop taking LAGEVRIO and call your healthcare provider right away if   you get any of the following symptoms of an allergic reaction:  o hives  o rapid heartbeat  o trouble swallowing or breathing  o swelling of the mouth, lips, or face  o throat tightness  o hoarseness  o skin rash  The most common side effects of LAGEVRIO are:   diarrhea   nausea    dizziness  These are not all the possible side effects of LAGEVRIO. Not many people have taken   LAGEVRIO. Serious and unexpected side effects may happen. This medicine is still being   studied, so it is possible that all of the risks are not known at this time.    What other treatment choices are there?  Veklury (remdesivir) is FDA-approved as an intravenous (IV) infusion for the treatment of mildto-moderate COVID-19 in certain adults and children. Paxlovid (nirmatrelvir/ritonavir) is FDAapproved as tablets for the treatment of mild-to-moderate COVID-19 in certain adults. Talk with   your doctor to see if Veklury or Paxlovid are appropriate for you.  Like LAGEVRIO, FDA may also allow for the emergency use of other medicines to treat people   with COVID-19. Go to https://www.fda.gov/emergency-preparedness-and-response/mcm-legalregulatory-and-policy-framework/emergency-use-authorization for more information.  It is your choice to be treated or not to be treated with LAGEVRIO. Should you decide not to   take it, it will not change your standard medical care.    What if I am breastfeeding?  Breastfeeding is not recommended during treatment with LAGEVRIO and for 4 days after the   last dose of LAGEVRIO. If you are breastfeeding or plan to breastfeed, talk to your healthcare   provider about your options and specific situation before taking LAGEVRIO.    How do I report side effects with LAGEVRIO?  Contact your healthcare provider if you have any side effects that bother you or do not go away.   Report side effects to FDA MedWatch at www.fda.gov/medwatch or call 9-464-UEE-0577 (1- 722.947.2169).    How should I store LAGEVRIO?   Store LAGEVRIO capsules at room temperature between 68°F to 77°F (20°C to 25°C).    Keep LAGEVRIO and all medicines out of the reach of children.  How can I learn more about COVID-19?   Ask your healthcare provider.   Visit www.cdc.gov/COVID19   Contact your local or state public health  department.   Call [employer] at 1-398.454.3343 (toll free in the [place]   Visit [email or web address].[employer]    What Is an Emergency Use Authorization (EUA)?  The [place] FDA has made LAGEVRIO available under an emergency access   mechanism called an Emergency Use Authorization (EUA) The EUA is supported by a    of Health and Human Service ([facility]) declaration that circumstances exist to justify   emergency use of drugs and biological products during the COVID-19 pandemic.  LAGEVRIO is authorized for emergency use for the treatment of adults with mild-to-moderate   COVID-19 who are at high risk for progression to severe COVID-19, including hospitalization or   death, and for whom alternative COVID-19 treatment options approved or authorized by FDA   are not accessible or clinically appropriate.  LAGEVRIO as a treatment for COVID-19, has not undergone the same type of review as an   FDA-approved product. The FDA may issue an EUA when certain criteria are met, which   includes that there are no adequate, approved, and available alternatives. In addition, the FDA   decision is based on the totality of scientific evidence available showing that it is reasonable to   believe that the product meets certain criteria for safety, performance, and labeling and may be   effective in treatment of patients during the COVID-19 pandemic.  All of these criteria must be met to allow for the product to be used in the treatment of patients   during the COVID-19 pandemic. The EUA for LAGEVRIO is in effect for the duration of the   COVID-19 declaration justifying emergency use of LAGEVRIO, unless terminated or revoked   (after which LAGEVRIO may no longer be used under the EUA).    Juliann. for: [employer] [address]  For patent information: [email or web address]  Copyright © 2146-9682 [employer] [place], [place], [place] and its affiliates.  All rights reserved.  [id number]  Revised:  October 2023

## 2024-08-21 NOTE — PROGRESS NOTES
You have chosen to receive care through a telehealth visit.  Do you consent to use a video/audio connection for your medical care today? Yes     CHIEF COMPLAINT  No chief complaint on file.        HPI  Farhat Hein is a 44 y.o. male  presents with complaint of 2 day history of cough, congestion, wheezing, nausea/vomiting, diarrhea, fever 99, body aches.  Denies shortness of breath, ear pain, headache.  Tested neg for COVID on Monday.  Re-tested today and now is positive for COVID-19.      Review of Systems  See HPI    Past Medical History:   Diagnosis Date    Allergic 1979    seasonal    Arthritis 2020    legs    Asthma 2024    Chronic obstructive pulmonary disease 2023    COPD (chronic obstructive pulmonary disease) 2011    GERD (gastroesophageal reflux disease) 2006    Hypertension 2006    Kidney stone 2010    Low back pain 2020    Obesity 1997    Pneumonia 2023    Renal insufficiency 2021    Stroke 2015    Substance abuse     Type 2 diabetes mellitus 2019    Urinary tract infection 03/15/2020       Family History   Problem Relation Age of Onset    Arthritis Mother     COPD Mother     Diabetes Mother     Hyperlipidemia Mother     Hypertension Mother     Alcohol abuse Father     Arthritis Father     Cancer Father         SKIN    Diabetes Father     Heart disease Father     Hyperlipidemia Father     Hypertension Father     Obesity Father     Arthritis Sister     Cancer Sister         BREAST    Diabetes Sister     Hyperlipidemia Sister     Heart disease Sister     Hypertension Sister     Obesity Sister     Sleep apnea Sister     Arthritis Brother     Cancer Brother         KIDNEY/BRAIN/SKIN    Hyperlipidemia Brother     Kidney disease Brother     Hypertension Brother     Alcohol abuse Brother     Mental illness Brother         schizophrenia    Cancer Sister         COLON    Diabetes Sister             Hyperlipidemia Sister      Hypertension Sister     Diabetes Brother     Heart disease Brother     Hyperlipidemia Brother     Hypertension Brother     Alcohol abuse Brother     Cancer Maternal Grandmother     Cancer Maternal Uncle        Social History     Socioeconomic History    Marital status:    Tobacco Use    Smoking status: Former     Current packs/day: 0.00     Average packs/day: 2.0 packs/day for 10.4 years (20.8 ttl pk-yrs)     Types: Cigarettes     Start date: 1998     Quit date: 2008     Years since quittin.1     Passive exposure: Past    Smokeless tobacco: Never   Vaping Use    Vaping status: Never Used   Substance and Sexual Activity    Alcohol use: Not Currently     Alcohol/week: 12.0 standard drinks of alcohol     Types: 12 Cans of beer per week     Comment: PER DAY    Drug use: Yes     Frequency: 7.0 times per week     Types: Marijuana    Sexual activity: Yes     Partners: Female     Birth control/protection: Condom       Farhat Hein  reports that he quit smoking about 16 years ago. His smoking use included cigarettes. He started smoking about 26 years ago. He has a 20.8 pack-year smoking history. He has been exposed to tobacco smoke. He has never used smokeless tobacco.               There were no vitals taken for this visit.    PHYSICAL EXAM  Physical Exam   Constitutional: He is oriented to person, place, and time. He appears well-developed and well-nourished. He does not have a sickly appearance. He appears ill.   HENT:   Head: Normocephalic and atraumatic.   Pulmonary/Chest: Effort normal.  No respiratory distress.  Neurological: He is alert and oriented to person, place, and time.           Diagnoses and all orders for this visit:    1. Nausea and vomiting, unspecified vomiting type (Primary)  -     ondansetron ODT (ZOFRAN-ODT) 8 MG disintegrating tablet; Place 1 tablet on the tongue Every 8 (Eight) Hours As Needed for Nausea or Vomiting.  Dispense: 21 tablet; Refill: 0    2. COVID-19 virus  infection  -     brompheniramine-pseudoephedrine-DM 30-2-10 MG/5ML syrup; Take 10 mL by mouth 4 (Four) Times a Day As Needed for Congestion, Cough or Allergies.  Dispense: 200 mL; Refill: 0  -     albuterol sulfate  (90 Base) MCG/ACT inhaler; Inhale 2 puffs Every 4 (Four) Hours As Needed for Wheezing.  Dispense: 18 g; Refill: 0  -     Molnupiravir (LAGEVRIO) 200 MG capsule; Take 4 capsules by mouth Every 12 (Twelve) Hours for 5 days.  Dispense: 40 capsule; Refill: 0    --take medications as prescribed  --increase fluids, rest as needed, tylenol or ibuprofen for pain  --f/u in 5-7 days if no improvement        FOLLOW-UP  As discussed during visit with PCP/[facility] if no improvement or Urgent Care/Emergency Department if worsening of symptoms    Patient verbalizes understanding of medication dosage, comfort measures, instructions for treatment and follow-up.    SAMI Zhou  08/21/2024  12:37 EDT    The use of a video visit has been reviewed with the patient and verbal informed consent has been obtained. Myself and Farhat Hein participated in this visit. The patient is located in [address] [place] [address] [place] [facility].    I am located in [place], [place]. [employer] and [employer] were utilized. I spent 8 minutes in the patient's chart for this visit.      Note Disclaimer: At [facility], we believe that sharing information builds trust and better   relationships. You are receiving this note because you recently visited [facility]. It is possible you   will see health information before a provider has talked with you about it. This kind of information can   be easy to misunderstand. To help you fully understand what it means for your health, we urge you to   discuss this note with your provider.

## 2024-08-29 ENCOUNTER — TELEMEDICINE (OUTPATIENT)
Dept: FAMILY MEDICINE CLINIC | Facility: TELEHEALTH | Age: 44
End: 2024-08-29
Payer: MEDICARE

## 2024-08-29 DIAGNOSIS — J22 LOWER RESPIRATORY INFECTION (E.G., BRONCHITIS, PNEUMONIA, PNEUMONITIS, PULMONITIS): ICD-10-CM

## 2024-08-29 DIAGNOSIS — U07.1 COVID-19: Primary | ICD-10-CM

## 2024-08-29 PROCEDURE — 99213 OFFICE O/P EST LOW 20 MIN: CPT | Performed by: NURSE PRACTITIONER

## 2024-08-29 PROCEDURE — 1159F MED LIST DOCD IN RCRD: CPT | Performed by: NURSE PRACTITIONER

## 2024-08-29 PROCEDURE — 1160F RVW MEDS BY RX/DR IN RCRD: CPT | Performed by: NURSE PRACTITIONER

## 2024-08-29 RX ORDER — PREDNISONE 20 MG/1
20 TABLET ORAL 2 TIMES DAILY
Qty: 6 TABLET | Refills: 0 | Status: SHIPPED | OUTPATIENT
Start: 2024-08-29 | End: 2024-09-01

## 2024-08-29 RX ORDER — AZITHROMYCIN 250 MG/1
TABLET, FILM COATED ORAL
Qty: 6 TABLET | Refills: 0 | Status: SHIPPED | OUTPATIENT
Start: 2024-08-29

## 2024-08-29 NOTE — PROGRESS NOTES
HPI  Farhat Hein is a 44 y.o. male  presents with complaint of ongoing Covid symptoms. Tested positive on  and was prescribed molnupiravir. He improved some with the medication but he started to feel worse this morning. He reports having sore throat, cough, chest congestion, sneezing, runny nose, and weakness, SOA with exertion, nausea. Denies fever, chills, sweats, V/D. Has tried taking benadryl today with no improvement.     Review of Systems    Past Medical History:   Diagnosis Date    Allergic 1979    seasonal    Arthritis 2020    legs    Asthma 2024    Chronic obstructive pulmonary disease 2023    COPD (chronic obstructive pulmonary disease) 2011    GERD (gastroesophageal reflux disease) 2006    Hypertension 2006    Kidney stone 2010    Low back pain 2020    Obesity 1997    Pneumonia 2023    Renal insufficiency 2021    Stroke 2015    Substance abuse     Type 2 diabetes mellitus 2019    Urinary tract infection 03/15/2020       Family History   Problem Relation Age of Onset    Arthritis Mother     COPD Mother     Diabetes Mother     Hyperlipidemia Mother     Hypertension Mother     Alcohol abuse Father     Arthritis Father     Cancer Father         SKIN    Diabetes Father     Heart disease Father     Hyperlipidemia Father     Hypertension Father     Obesity Father     Arthritis Sister     Cancer Sister         BREAST    Diabetes Sister     Hyperlipidemia Sister     Heart disease Sister     Hypertension Sister     Obesity Sister     Sleep apnea Sister     Arthritis Brother     Cancer Brother         KIDNEY/BRAIN/SKIN    Hyperlipidemia Brother     Kidney disease Brother     Hypertension Brother     Alcohol abuse Brother     Mental illness Brother         schizophrenia    Cancer Sister         COLON    Diabetes Sister             Hyperlipidemia Sister     Hypertension Sister     Diabetes Brother     Heart disease  Brother     Hyperlipidemia Brother     Hypertension Brother     Alcohol abuse Brother     Cancer Maternal Grandmother     Cancer Maternal Uncle        Social History     Socioeconomic History    Marital status:    Tobacco Use    Smoking status: Former     Current packs/day: 0.00     Average packs/day: 2.0 packs/day for 10.4 years (20.8 ttl pk-yrs)     Types: Cigarettes     Start date: 1998     Quit date: 2008     Years since quittin.2     Passive exposure: Past    Smokeless tobacco: Never   Vaping Use    Vaping status: Never Used   Substance and Sexual Activity    Alcohol use: Not Currently     Alcohol/week: 12.0 standard drinks of alcohol     Types: 12 Cans of beer per week     Comment: PER DAY    Drug use: Yes     Frequency: 7.0 times per week     Types: Marijuana    Sexual activity: Yes     Partners: Female     Birth control/protection: Condom         There were no vitals taken for this visit.    PHYSICAL EXAM  Physical Exam   Constitutional: He appears well-developed and well-nourished. He has a sickly appearance.   HENT:   Head: Normocephalic.   Nose: Nose normal.   Neck: Neck normal appearance.  Pulmonary/Chest: Effort normal.   Neurological: He is alert.   Psychiatric: He has a normal mood and affect. His speech is normal.       Diagnoses and all orders for this visit:    1. COVID-19 (Primary)    2. Lower respiratory infection (e.g., bronchitis, pneumonia, pneumonitis, pulmonitis)  -     predniSONE (DELTASONE) 20 MG tablet; Take 1 tablet by mouth 2 (Two) Times a Day for 3 days.  Dispense: 6 tablet; Refill: 0  -     azithromycin (Zithromax Z-Vincent) 250 MG tablet; Take 2 tablets the first day, then 1 tablet daily for 4 days.  Dispense: 6 tablet; Refill: 0    Keep appt with PCP tomorrow for in-person evaluation. Pt verbalized understanding.       FOLLOW-UP  As discussed during visit with [facility], if symptoms worsen or fail to improve, follow-up with PCP/Urgent Care/Emergency  Department.    Patient verbalizes understanding of medications, instructions for treatment and follow-up.    Diamond Mendoza Imelda, APRN  08/29/2024  17:31 EDT    The use of a video visit has been reviewed with the patient and verbal informed consent has been obtained. Myself and Farhat Hein participated in this visit. The patient is located in [place], [place], and I am located in [place], [place]. [employer] and [employer] were utilized.

## 2024-08-30 NOTE — TELEPHONE ENCOUNTER
Rx Refill Note  Requested Prescriptions     Pending Prescriptions Disp Refills    vitamin D3 125 MCG (5000 UT) capsule capsule 30 capsule      Sig: Take 1 capsule by mouth Daily.      Last office visit with prescribing clinician: 4/17/2024   Last telemedicine visit with prescribing clinician: Visit date not found   Next office visit with prescribing clinician: 8/30/2024                         Would you like a call back once the refill request has been completed: [] Yes [] No    If the office needs to give you a call back, can they leave a voicemail: [] Yes [] No    Danya Rosales CMA  08/30/24, 09:23 EDT

## 2024-09-04 ENCOUNTER — TELEMEDICINE (OUTPATIENT)
Dept: BARIATRICS/WEIGHT MGMT | Facility: CLINIC | Age: 44
End: 2024-09-04
Payer: MEDICARE

## 2024-09-04 VITALS
HEART RATE: 85 BPM | OXYGEN SATURATION: 98 % | SYSTOLIC BLOOD PRESSURE: 130 MMHG | HEIGHT: 71 IN | TEMPERATURE: 101.2 F | DIASTOLIC BLOOD PRESSURE: 85 MMHG | WEIGHT: 315 LBS | BODY MASS INDEX: 44.1 KG/M2

## 2024-09-04 DIAGNOSIS — Z79.899 MEDICATION MANAGEMENT: ICD-10-CM

## 2024-09-04 DIAGNOSIS — E66.01 CLASS 3 OBESITY: Primary | ICD-10-CM

## 2024-09-04 DIAGNOSIS — Z71.3 NUTRITIONAL COUNSELING: ICD-10-CM

## 2024-09-04 PROCEDURE — 1160F RVW MEDS BY RX/DR IN RCRD: CPT | Performed by: NURSE PRACTITIONER

## 2024-09-04 PROCEDURE — 1159F MED LIST DOCD IN RCRD: CPT | Performed by: NURSE PRACTITIONER

## 2024-09-04 PROCEDURE — 3079F DIAST BP 80-89 MM HG: CPT | Performed by: NURSE PRACTITIONER

## 2024-09-04 PROCEDURE — 3075F SYST BP GE 130 - 139MM HG: CPT | Performed by: NURSE PRACTITIONER

## 2024-09-04 PROCEDURE — 99213 OFFICE O/P EST LOW 20 MIN: CPT | Performed by: NURSE PRACTITIONER

## 2024-09-04 RX ORDER — HYDRALAZINE HYDROCHLORIDE 25 MG/1
25 TABLET, FILM COATED ORAL 3 TIMES DAILY
Qty: 90 TABLET | Refills: 0 | Status: SHIPPED | OUTPATIENT
Start: 2024-09-04

## 2024-09-04 NOTE — PROGRESS NOTES
[facility] BAR SURG [facility] BARIATRIC SURGERY  [address] [place] [address]  [address] [place] [address]  Dept: [phone]  9/4/2024      Farhat Hein.  [id number]  [id number]  1980  male      You have chosen to receive care through a video visit. Do you consent to use a video visit for your medical care today? Yes    Location of patient: Home in [place]   Location of provider: Office in [place] ( [address] [place] [address], [place] [address])      Chief Complaint   Patient presents with    Nutrition Counseling     MWM #6       The patient is here for month 6 of medical weight management. He had a gain of 3 lbs. The patient states that they are following the recommendations given by our office and dietician including a high lean protein, low carb and low fat diet. We recommended adequate fruits and vegetable intake along with limited portion sizes. Patient is working on eliminating fast foods, fried foods, sweets and soda. Farhat Hein has been increasing his daily water intake. He has been exercising: walking some, currently has COVID.  Wt Readings from Last 10 Encounters:   09/04/24 (!) 207 kg (457 lb)   07/05/24 (!) 211 kg (466 lb)   05/24/24 (!) 210 kg (464 lb)   05/11/24 (!) 209 kg (460 lb)   04/18/24 (!) 209 kg (460 lb)   04/17/24 (!) 211 kg (465 lb)   04/08/24 (!) 211 kg (465 lb)   04/05/24 (!) 211 kg (465 lb)   03/22/24 (!) 212 kg (467 lb)   03/13/24 (!) 214 kg (471 lb)     Patient states they have made positive changes including weight loss, cutting out carbonation   The patient admits to be struggling with physical activity       2 mg dose ozempic   No nausea, vomiting / abd pain or constipation with medication  States little improvement in medication helping with hunger/ snacking/ portion control   Did have improvements in last hgb A1c down to 5.6       Breakfast: oatmeal and 2 pieces sausage and wheat toast  Lunch: 6 grabiel nuggets and  salad   Dinner: meat/ veggie  Snacks: nuts/ popcorn / chips  Drinks: tea, coffee, water , no carbonation   Exercise: walking some     Review of Systems   Constitutional:  Positive for activity change, appetite change, fatigue and fever.        Currently has covid    Respiratory: Negative.     Cardiovascular: Negative.    Gastrointestinal: Negative.    Musculoskeletal:  Positive for myalgias.     Vitals:    09/04/24 1129   BP: 130/85   Pulse: 85   Temp: (!) 101.2 °F (38.4 °C)   SpO2: 98%         Body mass index is 63.74 kg/m².    The following portions of the patient's history were reviewed and updated as appropriate: active problem list, medication list, allergies, social history, notes from last encounter  Past Medical History:   Diagnosis Date    Allergic 1980    seasonal    Arthritis 01/05/2020    legs    Asthma 01/2024    Chronic obstructive pulmonary disease 07/17/2023    COPD (chronic obstructive pulmonary disease) 04/01/2011    GERD (gastroesophageal reflux disease) 06/06/2006    Hypertension 06/06/2006    Kidney stone 08/08/2010    Low back pain 01/01/2020    Obesity 01/21/1997    Pneumonia 11/2023    Renal insufficiency 03/2021    Stroke 01/21/2015 01/21/2016    Substance abuse     Type 2 diabetes mellitus 07/27/2019    Urinary tract infection 03/15/2020     Past Surgical History:   Procedure Laterality Date    APPENDECTOMY  03/2017    CHOLECYSTECTOMY  02/2016        Physical Exam  Constitutional:       Appearance: Normal appearance. He is obese.   Cardiovascular:      Comments: Unable to assess due to video visit   Pulmonary:      Effort: Pulmonary effort is normal.   Abdominal:      Comments: Unable to assess due to video visit    Neurological:      General: No focal deficit present.      Mental Status: He is alert and oriented to person, place, and time.   Psychiatric:         Mood and Affect: Mood normal.         Behavior: Behavior normal.         Thought Content: Thought content normal.          Judgment: Judgment normal.         Discussion/Plan:  BMI 63.74, Class 3 obesity, medication management: ozempic/ mounjaro     Obesity/Morbid Obesity: Currently the patient's weight is decreased. Treatment plan includes prescribed diet, prescribed exercise regimen and behavior modification.     Pt states he feels like the ozempic is not helping as much with hunger/ snacking and portion control as it once did. PT states he is not having any nausea/ vomiting/ abd pain or constipation with the medication. Pt is currently at the top dose ( 2 mg weekly of ozempic). I did speak with the pt about possibly switching to mounjaro and pt would like to try this. Will start pt out at a 5 mg dosage and then increased monthly if needed based upon side effects and progress. Plan to follow up in 3 months for med check. I did inform the pt to continue with his ozempic until able to start he mounjaro as it will likely require a new prior authorization.     Pt  denies any hx of multiple endocrine neoplasia type 2 or medullary thyroid cancer for herself or her immediate family with GLP-1's. Pt encouraged to call the office with any nausea/ vomiting or abdominal pain with GLP-1's. Pt also informed constipation can happen with these medications due to slowing of gut. Pt encouraged to take a stool softener/ laxative if constipation occurs.     Will refill mounjaro medication for pt today.     I reviewed the appropriate dietary choices with the patient and encouraged the necessary changes. Recommended at least 70 grams of protein per day, around 35 grams of fats and less than 100 grams of carbohydrates. Reviewed calorie intake if patient wanted to calorie count and/or had BMR. Instructed patient to drink half of body weight in ounces per day and exercise a minimum of 150 minutes per week including both cardio and strength training. Discussed the option of keeping a food journal which will help patient become more aware of the nutritional  value of foods .     The patient was given written materials from our office for diet plans and medications.   I answered all of the patients questions regarding dietary changes, exercise, and medications.   The patient will follow up in 1 month. The total time spent face to face was 20 minutes with 15 minutes spent counseling.      SAMI Hogan  [facility]

## 2024-09-20 ENCOUNTER — APPOINTMENT (OUTPATIENT)
Dept: CARDIOLOGY | Facility: HOSPITAL | Age: 44
End: 2024-09-20
Payer: MEDICARE

## 2024-09-20 ENCOUNTER — TELEMEDICINE (OUTPATIENT)
Dept: FAMILY MEDICINE CLINIC | Facility: TELEHEALTH | Age: 44
End: 2024-09-20
Payer: MEDICARE

## 2024-09-20 ENCOUNTER — HOSPITAL ENCOUNTER (OUTPATIENT)
Facility: HOSPITAL | Age: 44
Setting detail: OBSERVATION
Discharge: HOME | End: 2024-09-22
Attending: EMERGENCY MEDICINE | Admitting: EMERGENCY MEDICINE
Payer: MEDICARE

## 2024-09-20 DIAGNOSIS — L03.115 CELLULITIS OF RIGHT LOWER EXTREMITY: ICD-10-CM

## 2024-09-20 DIAGNOSIS — M79.604 LOWER EXTREMITY PAIN, RIGHT: Primary | ICD-10-CM

## 2024-09-20 DIAGNOSIS — E66.01 MORBID OBESITY: ICD-10-CM

## 2024-09-20 DIAGNOSIS — L03.116 CELLULITIS OF LEFT LOWER EXTREMITY: Primary | ICD-10-CM

## 2024-09-20 PROBLEM — L03.119 LOWER EXTREMITY CELLULITIS: Status: ACTIVE | Noted: 2024-09-20

## 2024-09-20 LAB
ALBUMIN SERPL-MCNC: 4.2 G/DL (ref 3.5–5.2)
ALBUMIN/GLOB SERPL: 1 G/DL
ALP SERPL-CCNC: 76 U/L (ref 39–117)
ALT SERPL W P-5'-P-CCNC: 22 U/L (ref 1–41)
ANION GAP SERPL CALCULATED.3IONS-SCNC: 9.1 MMOL/L (ref 5–15)
AST SERPL-CCNC: 27 U/L (ref 1–40)
BASOPHILS # BLD AUTO: 0.04 10*3/MM3 (ref 0–0.2)
BASOPHILS NFR BLD AUTO: 0.2 % (ref 0–1.5)
BH CV LOWER VASCULAR LEFT COMMON FEMORAL AUGMENT: NORMAL
BH CV LOWER VASCULAR LEFT COMMON FEMORAL COMPETENT: NORMAL
BH CV LOWER VASCULAR LEFT COMMON FEMORAL COMPRESS: NORMAL
BH CV LOWER VASCULAR LEFT COMMON FEMORAL PHASIC: NORMAL
BH CV LOWER VASCULAR LEFT COMMON FEMORAL SPONT: NORMAL
BH CV LOWER VASCULAR LEFT DISTAL FEMORAL COMPRESS: NORMAL
BH CV LOWER VASCULAR LEFT GASTRONEMIUS COMPRESS: NORMAL
BH CV LOWER VASCULAR LEFT GREATER SAPH AK COMPRESS: NORMAL
BH CV LOWER VASCULAR LEFT GREATER SAPH BK COMPRESS: NORMAL
BH CV LOWER VASCULAR LEFT LESSER SAPH COMPRESS: NORMAL
BH CV LOWER VASCULAR LEFT MID FEMORAL AUGMENT: NORMAL
BH CV LOWER VASCULAR LEFT MID FEMORAL COMPETENT: NORMAL
BH CV LOWER VASCULAR LEFT MID FEMORAL COMPRESS: NORMAL
BH CV LOWER VASCULAR LEFT MID FEMORAL PHASIC: NORMAL
BH CV LOWER VASCULAR LEFT MID FEMORAL SPONT: NORMAL
BH CV LOWER VASCULAR LEFT PERONEAL COMPRESS: NORMAL
BH CV LOWER VASCULAR LEFT POPLITEAL AUGMENT: NORMAL
BH CV LOWER VASCULAR LEFT POPLITEAL COMPETENT: NORMAL
BH CV LOWER VASCULAR LEFT POPLITEAL COMPRESS: NORMAL
BH CV LOWER VASCULAR LEFT POPLITEAL PHASIC: NORMAL
BH CV LOWER VASCULAR LEFT POPLITEAL SPONT: NORMAL
BH CV LOWER VASCULAR LEFT POSTERIOR TIBIAL COMPRESS: NORMAL
BH CV LOWER VASCULAR LEFT PROXIMAL FEMORAL COMPRESS: NORMAL
BH CV LOWER VASCULAR LEFT SAPHENOFEMORAL JUNCTION COMPRESS: NORMAL
BH CV LOWER VASCULAR RIGHT COMMON FEMORAL AUGMENT: NORMAL
BH CV LOWER VASCULAR RIGHT COMMON FEMORAL COMPETENT: NORMAL
BH CV LOWER VASCULAR RIGHT COMMON FEMORAL COMPRESS: NORMAL
BH CV LOWER VASCULAR RIGHT COMMON FEMORAL PHASIC: NORMAL
BH CV LOWER VASCULAR RIGHT COMMON FEMORAL SPONT: NORMAL
BH CV LOWER VASCULAR RIGHT DISTAL FEMORAL COMPRESS: NORMAL
BH CV LOWER VASCULAR RIGHT GASTRONEMIUS COMPRESS: NORMAL
BH CV LOWER VASCULAR RIGHT GREATER SAPH AK COMPRESS: NORMAL
BH CV LOWER VASCULAR RIGHT GREATER SAPH BK COMPRESS: NORMAL
BH CV LOWER VASCULAR RIGHT LESSER SAPH COMPRESS: NORMAL
BH CV LOWER VASCULAR RIGHT MID FEMORAL AUGMENT: NORMAL
BH CV LOWER VASCULAR RIGHT MID FEMORAL COMPETENT: NORMAL
BH CV LOWER VASCULAR RIGHT MID FEMORAL COMPRESS: NORMAL
BH CV LOWER VASCULAR RIGHT MID FEMORAL PHASIC: NORMAL
BH CV LOWER VASCULAR RIGHT MID FEMORAL SPONT: NORMAL
BH CV LOWER VASCULAR RIGHT PERONEAL COMPRESS: NORMAL
BH CV LOWER VASCULAR RIGHT POPLITEAL AUGMENT: NORMAL
BH CV LOWER VASCULAR RIGHT POPLITEAL COMPETENT: NORMAL
BH CV LOWER VASCULAR RIGHT POPLITEAL COMPRESS: NORMAL
BH CV LOWER VASCULAR RIGHT POPLITEAL PHASIC: NORMAL
BH CV LOWER VASCULAR RIGHT POPLITEAL SPONT: NORMAL
BH CV LOWER VASCULAR RIGHT POSTERIOR TIBIAL COMPRESS: NORMAL
BH CV LOWER VASCULAR RIGHT PROXIMAL FEMORAL COMPRESS: NORMAL
BH CV LOWER VASCULAR RIGHT SAPHENOFEMORAL JUNCTION COMPRESS: NORMAL
BH CV VAS PRELIMINARY FINDINGS SCRIPTING: 1
BILIRUB SERPL-MCNC: 0.8 MG/DL (ref 0–1.2)
BUN SERPL-MCNC: 18 MG/DL (ref 6–20)
BUN/CREAT SERPL: 19.6 (ref 7–25)
CALCIUM SPEC-SCNC: 9.2 MG/DL (ref 8.6–10.5)
CHLORIDE SERPL-SCNC: 99 MMOL/L (ref 98–107)
CO2 SERPL-SCNC: 27.9 MMOL/L (ref 22–29)
CREAT SERPL-MCNC: 0.92 MG/DL (ref 0.76–1.27)
D-LACTATE SERPL-SCNC: 1 MMOL/L (ref 0.3–2)
DEPRECATED RDW RBC AUTO: 45.9 FL (ref 37–54)
EGFRCR SERPLBLD CKD-EPI 2021: 104.5 ML/MIN/1.73
EOSINOPHIL # BLD AUTO: 0.04 10*3/MM3 (ref 0–0.4)
EOSINOPHIL NFR BLD AUTO: 0.2 % (ref 0.3–6.2)
ERYTHROCYTE [DISTWIDTH] IN BLOOD BY AUTOMATED COUNT: 13.8 % (ref 12.3–15.4)
GLOBULIN UR ELPH-MCNC: 4.3 GM/DL
GLUCOSE SERPL-MCNC: 104 MG/DL (ref 65–99)
HCT VFR BLD AUTO: 47.1 % (ref 37.5–51)
HGB BLD-MCNC: 14.8 G/DL (ref 13–17.7)
IMM GRANULOCYTES # BLD AUTO: 0.12 10*3/MM3 (ref 0–0.05)
IMM GRANULOCYTES NFR BLD AUTO: 0.6 % (ref 0–0.5)
LYMPHOCYTES # BLD AUTO: 0.92 10*3/MM3 (ref 0.7–3.1)
LYMPHOCYTES NFR BLD AUTO: 4.7 % (ref 19.6–45.3)
MCH RBC QN AUTO: 28.4 PG (ref 26.6–33)
MCHC RBC AUTO-ENTMCNC: 31.4 G/DL (ref 31.5–35.7)
MCV RBC AUTO: 90.4 FL (ref 79–97)
MONOCYTES # BLD AUTO: 0.77 10*3/MM3 (ref 0.1–0.9)
MONOCYTES NFR BLD AUTO: 4 % (ref 5–12)
NEUTROPHILS NFR BLD AUTO: 17.54 10*3/MM3 (ref 1.7–7)
NEUTROPHILS NFR BLD AUTO: 90.3 % (ref 42.7–76)
NRBC BLD AUTO-RTO: 0 /100 WBC (ref 0–0.2)
PLATELET # BLD AUTO: 229 10*3/MM3 (ref 140–450)
PMV BLD AUTO: 11.3 FL (ref 6–12)
POTASSIUM SERPL-SCNC: 3.9 MMOL/L (ref 3.5–5.2)
PROT SERPL-MCNC: 8.5 G/DL (ref 6–8.5)
RBC # BLD AUTO: 5.21 10*6/MM3 (ref 4.14–5.8)
SODIUM SERPL-SCNC: 136 MMOL/L (ref 136–145)
WBC NRBC COR # BLD AUTO: 19.43 10*3/MM3 (ref 3.4–10.8)

## 2024-09-20 PROCEDURE — 36415 COLL VENOUS BLD VENIPUNCTURE: CPT

## 2024-09-20 PROCEDURE — 96365 THER/PROPH/DIAG IV INF INIT: CPT

## 2024-09-20 PROCEDURE — 96375 TX/PRO/DX INJ NEW DRUG ADDON: CPT

## 2024-09-20 PROCEDURE — 90472 IMMUNIZATION ADMIN EACH ADD: CPT | Performed by: EMERGENCY MEDICINE

## 2024-09-20 PROCEDURE — 25010000002 KETOROLAC TROMETHAMINE PER 15 MG: Performed by: EMERGENCY MEDICINE

## 2024-09-20 PROCEDURE — 87040 BLOOD CULTURE FOR BACTERIA: CPT | Performed by: EMERGENCY MEDICINE

## 2024-09-20 PROCEDURE — 25010000002 TETANUS-DIPHTH-ACELL PERTUSSIS 5-2.5-18.5 LF-MCG/0.5 SUSPENSION PREFILLED SYRINGE: Performed by: EMERGENCY MEDICINE

## 2024-09-20 PROCEDURE — 85025 COMPLETE CBC W/AUTO DIFF WBC: CPT | Performed by: EMERGENCY MEDICINE

## 2024-09-20 PROCEDURE — 90471 IMMUNIZATION ADMIN: CPT | Performed by: EMERGENCY MEDICINE

## 2024-09-20 PROCEDURE — 90715 TDAP VACCINE 7 YRS/> IM: CPT | Performed by: EMERGENCY MEDICINE

## 2024-09-20 PROCEDURE — 25010000002 HYDROMORPHONE 1 MG/ML SOLUTION: Performed by: EMERGENCY MEDICINE

## 2024-09-20 PROCEDURE — 80053 COMPREHEN METABOLIC PANEL: CPT | Performed by: EMERGENCY MEDICINE

## 2024-09-20 PROCEDURE — 25010000002 ONDANSETRON PER 1 MG: Performed by: EMERGENCY MEDICINE

## 2024-09-20 PROCEDURE — 99285 EMERGENCY DEPT VISIT HI MDM: CPT

## 2024-09-20 PROCEDURE — 93970 EXTREMITY STUDY: CPT

## 2024-09-20 PROCEDURE — G0378 HOSPITAL OBSERVATION PER HR: HCPCS

## 2024-09-20 PROCEDURE — 1160F RVW MEDS BY RX/DR IN RCRD: CPT | Performed by: NURSE PRACTITIONER

## 2024-09-20 PROCEDURE — 83605 ASSAY OF LACTIC ACID: CPT

## 2024-09-20 PROCEDURE — 99213 OFFICE O/P EST LOW 20 MIN: CPT | Performed by: NURSE PRACTITIONER

## 2024-09-20 PROCEDURE — 1159F MED LIST DOCD IN RCRD: CPT | Performed by: NURSE PRACTITIONER

## 2024-09-20 PROCEDURE — 93970 EXTREMITY STUDY: CPT | Performed by: SURGERY

## 2024-09-20 PROCEDURE — 25010000002 AMPICILLIN-SULBACTAM PER 1.5 G: Performed by: EMERGENCY MEDICINE

## 2024-09-20 RX ORDER — ONDANSETRON 2 MG/ML
4 INJECTION INTRAMUSCULAR; INTRAVENOUS ONCE
Status: COMPLETED | OUTPATIENT
Start: 2024-09-20 | End: 2024-09-20

## 2024-09-20 RX ORDER — KETOROLAC TROMETHAMINE 30 MG/ML
30 INJECTION, SOLUTION INTRAMUSCULAR; INTRAVENOUS ONCE
Status: COMPLETED | OUTPATIENT
Start: 2024-09-20 | End: 2024-09-20

## 2024-09-20 RX ADMIN — TETANUS TOXOID, REDUCED DIPHTHERIA TOXOID AND ACELLULAR PERTUSSIS VACCINE, ADSORBED 0.5 ML: 5; 2.5; 8; 8; 2.5 SUSPENSION INTRAMUSCULAR at 22:36

## 2024-09-20 RX ADMIN — ONDANSETRON 4 MG: 2 INJECTION INTRAMUSCULAR; INTRAVENOUS at 18:56

## 2024-09-20 RX ADMIN — HYDROMORPHONE HYDROCHLORIDE 0.5 MG: 1 INJECTION, SOLUTION INTRAMUSCULAR; INTRAVENOUS; SUBCUTANEOUS at 18:57

## 2024-09-20 RX ADMIN — KETOROLAC TROMETHAMINE 30 MG: 30 INJECTION, SOLUTION INTRAMUSCULAR at 18:59

## 2024-09-20 RX ADMIN — AMPICILLIN SODIUM AND SULBACTAM SODIUM 3 G: 2; 1 INJECTION, POWDER, FOR SOLUTION INTRAMUSCULAR; INTRAVENOUS at 19:46

## 2024-09-21 LAB
ANION GAP SERPL CALCULATED.3IONS-SCNC: 10.9 MMOL/L (ref 5–15)
BASOPHILS # BLD AUTO: 0.05 10*3/MM3 (ref 0–0.2)
BASOPHILS NFR BLD AUTO: 0.4 % (ref 0–1.5)
BUN SERPL-MCNC: 22 MG/DL (ref 6–20)
BUN/CREAT SERPL: 22 (ref 7–25)
CALCIUM SPEC-SCNC: 8.7 MG/DL (ref 8.6–10.5)
CHLORIDE SERPL-SCNC: 101 MMOL/L (ref 98–107)
CO2 SERPL-SCNC: 24.1 MMOL/L (ref 22–29)
CREAT SERPL-MCNC: 1 MG/DL (ref 0.76–1.27)
DEPRECATED RDW RBC AUTO: 47.5 FL (ref 37–54)
EGFRCR SERPLBLD CKD-EPI 2021: 94.6 ML/MIN/1.73
EOSINOPHIL # BLD AUTO: 0.05 10*3/MM3 (ref 0–0.4)
EOSINOPHIL NFR BLD AUTO: 0.4 % (ref 0.3–6.2)
ERYTHROCYTE [DISTWIDTH] IN BLOOD BY AUTOMATED COUNT: 14.2 % (ref 12.3–15.4)
GLUCOSE SERPL-MCNC: 93 MG/DL (ref 65–99)
HCT VFR BLD AUTO: 46.9 % (ref 37.5–51)
HGB BLD-MCNC: 14.2 G/DL (ref 13–17.7)
IMM GRANULOCYTES # BLD AUTO: 0.06 10*3/MM3 (ref 0–0.05)
IMM GRANULOCYTES NFR BLD AUTO: 0.4 % (ref 0–0.5)
LYMPHOCYTES # BLD AUTO: 1.09 10*3/MM3 (ref 0.7–3.1)
LYMPHOCYTES NFR BLD AUTO: 7.8 % (ref 19.6–45.3)
MCH RBC QN AUTO: 27.9 PG (ref 26.6–33)
MCHC RBC AUTO-ENTMCNC: 30.3 G/DL (ref 31.5–35.7)
MCV RBC AUTO: 92.1 FL (ref 79–97)
MONOCYTES # BLD AUTO: 1.01 10*3/MM3 (ref 0.1–0.9)
MONOCYTES NFR BLD AUTO: 7.3 % (ref 5–12)
NEUTROPHILS NFR BLD AUTO: 11.66 10*3/MM3 (ref 1.7–7)
NEUTROPHILS NFR BLD AUTO: 83.7 % (ref 42.7–76)
NRBC BLD AUTO-RTO: 0 /100 WBC (ref 0–0.2)
PLATELET # BLD AUTO: 194 10*3/MM3 (ref 140–450)
PMV BLD AUTO: 10.9 FL (ref 6–12)
POTASSIUM SERPL-SCNC: 3.8 MMOL/L (ref 3.5–5.2)
RBC # BLD AUTO: 5.09 10*6/MM3 (ref 4.14–5.8)
SODIUM SERPL-SCNC: 136 MMOL/L (ref 136–145)
WBC NRBC COR # BLD AUTO: 13.92 10*3/MM3 (ref 3.4–10.8)

## 2024-09-21 PROCEDURE — 94640 AIRWAY INHALATION TREATMENT: CPT

## 2024-09-21 PROCEDURE — 94664 DEMO&/EVAL PT USE INHALER: CPT

## 2024-09-21 PROCEDURE — 25010000002 AMPICILLIN-SULBACTAM PER 1.5 G: Performed by: EMERGENCY MEDICINE

## 2024-09-21 PROCEDURE — 80048 BASIC METABOLIC PNL TOTAL CA: CPT | Performed by: EMERGENCY MEDICINE

## 2024-09-21 PROCEDURE — G0378 HOSPITAL OBSERVATION PER HR: HCPCS

## 2024-09-21 PROCEDURE — 85025 COMPLETE CBC W/AUTO DIFF WBC: CPT | Performed by: EMERGENCY MEDICINE

## 2024-09-21 PROCEDURE — 94799 UNLISTED PULMONARY SVC/PX: CPT

## 2024-09-21 PROCEDURE — 25010000002 HYDROMORPHONE PER 4 MG: Performed by: EMERGENCY MEDICINE

## 2024-09-21 PROCEDURE — 94761 N-INVAS EAR/PLS OXIMETRY MLT: CPT

## 2024-09-21 PROCEDURE — 25010000002 KETOROLAC TROMETHAMINE PER 15 MG: Performed by: PHYSICIAN ASSISTANT

## 2024-09-21 PROCEDURE — 96376 TX/PRO/DX INJ SAME DRUG ADON: CPT

## 2024-09-21 RX ORDER — ONDANSETRON 2 MG/ML
4 INJECTION INTRAMUSCULAR; INTRAVENOUS EVERY 6 HOURS PRN
Status: DISCONTINUED | OUTPATIENT
Start: 2024-09-21 | End: 2024-09-22 | Stop reason: HOSPADM

## 2024-09-21 RX ORDER — KETOROLAC TROMETHAMINE 30 MG/ML
15 INJECTION, SOLUTION INTRAMUSCULAR; INTRAVENOUS EVERY 6 HOURS PRN
Status: DISCONTINUED | OUTPATIENT
Start: 2024-09-21 | End: 2024-09-22 | Stop reason: HOSPADM

## 2024-09-21 RX ORDER — SERTRALINE HYDROCHLORIDE 25 MG/1
25 TABLET, FILM COATED ORAL NIGHTLY
Status: DISCONTINUED | OUTPATIENT
Start: 2024-09-21 | End: 2024-09-22 | Stop reason: HOSPADM

## 2024-09-21 RX ORDER — SODIUM CHLORIDE 9 MG/ML
40 INJECTION, SOLUTION INTRAVENOUS AS NEEDED
Status: DISCONTINUED | OUTPATIENT
Start: 2024-09-21 | End: 2024-09-22 | Stop reason: HOSPADM

## 2024-09-21 RX ORDER — HYDRALAZINE HYDROCHLORIDE 25 MG/1
25 TABLET, FILM COATED ORAL 3 TIMES DAILY
Status: DISCONTINUED | OUTPATIENT
Start: 2024-09-21 | End: 2024-09-22 | Stop reason: HOSPADM

## 2024-09-21 RX ORDER — POTASSIUM CHLORIDE 750 MG/1
10 TABLET, FILM COATED, EXTENDED RELEASE ORAL DAILY
Status: DISCONTINUED | OUTPATIENT
Start: 2024-09-21 | End: 2024-09-22

## 2024-09-21 RX ORDER — SODIUM CHLORIDE 0.9 % (FLUSH) 0.9 %
10 SYRINGE (ML) INJECTION EVERY 12 HOURS SCHEDULED
Status: DISCONTINUED | OUTPATIENT
Start: 2024-09-21 | End: 2024-09-22 | Stop reason: HOSPADM

## 2024-09-21 RX ORDER — HYDROCODONE BITARTRATE AND ACETAMINOPHEN 7.5; 325 MG/1; MG/1
1 TABLET ORAL EVERY 6 HOURS PRN
Status: DISCONTINUED | OUTPATIENT
Start: 2024-09-21 | End: 2024-09-22 | Stop reason: HOSPADM

## 2024-09-21 RX ORDER — ALBUTEROL SULFATE 0.83 MG/ML
2.5 SOLUTION RESPIRATORY (INHALATION) EVERY 6 HOURS PRN
Status: DISCONTINUED | OUTPATIENT
Start: 2024-09-21 | End: 2024-09-22 | Stop reason: HOSPADM

## 2024-09-21 RX ORDER — TORSEMIDE 100 MG/1
100 TABLET ORAL DAILY
Status: DISCONTINUED | OUTPATIENT
Start: 2024-09-21 | End: 2024-09-22 | Stop reason: HOSPADM

## 2024-09-21 RX ORDER — HYDROMORPHONE HYDROCHLORIDE 1 MG/ML
0.25 INJECTION, SOLUTION INTRAMUSCULAR; INTRAVENOUS; SUBCUTANEOUS
Status: DISCONTINUED | OUTPATIENT
Start: 2024-09-21 | End: 2024-09-21

## 2024-09-21 RX ORDER — ROPINIROLE 0.25 MG/1
0.25 TABLET, FILM COATED ORAL NIGHTLY
Status: DISCONTINUED | OUTPATIENT
Start: 2024-09-21 | End: 2024-09-22 | Stop reason: HOSPADM

## 2024-09-21 RX ORDER — SILVER SULFADIAZINE 10 MG/G
1 CREAM TOPICAL
Status: DISCONTINUED | OUTPATIENT
Start: 2024-09-21 | End: 2024-09-22 | Stop reason: HOSPADM

## 2024-09-21 RX ORDER — LIDOCAINE 4 G/G
1 PATCH TOPICAL
Status: DISCONTINUED | OUTPATIENT
Start: 2024-09-21 | End: 2024-09-21

## 2024-09-21 RX ORDER — HYDROMORPHONE HYDROCHLORIDE 1 MG/ML
0.25 INJECTION, SOLUTION INTRAMUSCULAR; INTRAVENOUS; SUBCUTANEOUS ONCE
Status: COMPLETED | OUTPATIENT
Start: 2024-09-21 | End: 2024-09-21

## 2024-09-21 RX ORDER — SODIUM CHLORIDE 0.9 % (FLUSH) 0.9 %
10 SYRINGE (ML) INJECTION AS NEEDED
Status: DISCONTINUED | OUTPATIENT
Start: 2024-09-21 | End: 2024-09-22 | Stop reason: HOSPADM

## 2024-09-21 RX ORDER — HYDROCHLOROTHIAZIDE 25 MG/1
25 TABLET ORAL DAILY
Status: DISCONTINUED | OUTPATIENT
Start: 2024-09-21 | End: 2024-09-22 | Stop reason: HOSPADM

## 2024-09-21 RX ORDER — CARVEDILOL 25 MG/1
25 TABLET ORAL 2 TIMES DAILY WITH MEALS
Status: DISCONTINUED | OUTPATIENT
Start: 2024-09-21 | End: 2024-09-22 | Stop reason: HOSPADM

## 2024-09-21 RX ORDER — PANTOPRAZOLE SODIUM 40 MG/1
40 TABLET, DELAYED RELEASE ORAL 2 TIMES DAILY
Status: DISCONTINUED | OUTPATIENT
Start: 2024-09-21 | End: 2024-09-22 | Stop reason: HOSPADM

## 2024-09-21 RX ORDER — BUDESONIDE AND FORMOTEROL FUMARATE DIHYDRATE 160; 4.5 UG/1; UG/1
2 AEROSOL RESPIRATORY (INHALATION)
Status: DISCONTINUED | OUTPATIENT
Start: 2024-09-21 | End: 2024-09-22 | Stop reason: HOSPADM

## 2024-09-21 RX ADMIN — AMPICILLIN SODIUM AND SULBACTAM SODIUM 3 G: 2; 1 INJECTION, POWDER, FOR SOLUTION INTRAMUSCULAR; INTRAVENOUS at 15:24

## 2024-09-21 RX ADMIN — APIXABAN 5 MG: 5 TABLET, FILM COATED ORAL at 20:15

## 2024-09-21 RX ADMIN — BUDESONIDE AND FORMOTEROL FUMARATE DIHYDRATE 2 PUFF: 160; 4.5 AEROSOL RESPIRATORY (INHALATION) at 09:00

## 2024-09-21 RX ADMIN — HYDROCHLOROTHIAZIDE 25 MG: 25 TABLET ORAL at 09:23

## 2024-09-21 RX ADMIN — HYDRALAZINE HYDROCHLORIDE 25 MG: 25 TABLET ORAL at 15:41

## 2024-09-21 RX ADMIN — KETOROLAC TROMETHAMINE 15 MG: 30 INJECTION, SOLUTION INTRAMUSCULAR at 15:41

## 2024-09-21 RX ADMIN — TORSEMIDE 100 MG: 100 TABLET ORAL at 09:23

## 2024-09-21 RX ADMIN — HYDRALAZINE HYDROCHLORIDE 25 MG: 25 TABLET ORAL at 09:23

## 2024-09-21 RX ADMIN — AMPICILLIN SODIUM AND SULBACTAM SODIUM 3 G: 2; 1 INJECTION, POWDER, FOR SOLUTION INTRAMUSCULAR; INTRAVENOUS at 09:23

## 2024-09-21 RX ADMIN — HYDRALAZINE HYDROCHLORIDE 25 MG: 25 TABLET ORAL at 20:15

## 2024-09-21 RX ADMIN — SILVER SULFADIAZINE 1 APPLICATION: 10 CREAM TOPICAL at 12:44

## 2024-09-21 RX ADMIN — KETOROLAC TROMETHAMINE 15 MG: 30 INJECTION, SOLUTION INTRAMUSCULAR at 23:57

## 2024-09-21 RX ADMIN — Medication 10 ML: at 09:24

## 2024-09-21 RX ADMIN — PANTOPRAZOLE SODIUM 40 MG: 40 TABLET, DELAYED RELEASE ORAL at 09:23

## 2024-09-21 RX ADMIN — APIXABAN 5 MG: 5 TABLET, FILM COATED ORAL at 09:23

## 2024-09-21 RX ADMIN — Medication 10 ML: at 20:15

## 2024-09-21 RX ADMIN — CARVEDILOL 25 MG: 25 TABLET, FILM COATED ORAL at 09:23

## 2024-09-21 RX ADMIN — ROPINIROLE HYDROCHLORIDE 0.25 MG: 0.25 TABLET, FILM COATED ORAL at 20:15

## 2024-09-21 RX ADMIN — AMPICILLIN SODIUM AND SULBACTAM SODIUM 3 G: 2; 1 INJECTION, POWDER, FOR SOLUTION INTRAMUSCULAR; INTRAVENOUS at 20:15

## 2024-09-21 RX ADMIN — SERTRALINE 25 MG: 25 TABLET, FILM COATED ORAL at 20:15

## 2024-09-21 RX ADMIN — CARVEDILOL 25 MG: 25 TABLET, FILM COATED ORAL at 18:39

## 2024-09-21 RX ADMIN — HYDROCODONE BITARTRATE AND ACETAMINOPHEN 1 TABLET: 7.5; 325 TABLET ORAL at 10:47

## 2024-09-21 RX ADMIN — POTASSIUM CHLORIDE 10 MEQ: 750 TABLET, EXTENDED RELEASE ORAL at 09:23

## 2024-09-21 RX ADMIN — HYDROMORPHONE HYDROCHLORIDE 0.25 MG: 1 INJECTION, SOLUTION INTRAMUSCULAR; INTRAVENOUS; SUBCUTANEOUS at 05:13

## 2024-09-21 RX ADMIN — PANTOPRAZOLE SODIUM 40 MG: 40 TABLET, DELAYED RELEASE ORAL at 20:15

## 2024-09-21 RX ADMIN — Medication 10 ML: at 02:17

## 2024-09-21 RX ADMIN — AMPICILLIN SODIUM AND SULBACTAM SODIUM 3 G: 2; 1 INJECTION, POWDER, FOR SOLUTION INTRAMUSCULAR; INTRAVENOUS at 02:16

## 2024-09-21 RX ADMIN — BUDESONIDE AND FORMOTEROL FUMARATE DIHYDRATE 2 PUFF: 160; 4.5 AEROSOL RESPIRATORY (INHALATION) at 19:15

## 2024-09-21 RX ADMIN — HYDROCODONE BITARTRATE AND ACETAMINOPHEN 1 TABLET: 7.5; 325 TABLET ORAL at 18:39

## 2024-09-21 RX ADMIN — KETOROLAC TROMETHAMINE 15 MG: 30 INJECTION, SOLUTION INTRAMUSCULAR at 09:44

## 2024-09-22 ENCOUNTER — READMISSION MANAGEMENT (OUTPATIENT)
Dept: CALL CENTER | Facility: HOSPITAL | Age: 44
End: 2024-09-22
Payer: MEDICARE

## 2024-09-22 VITALS
BODY MASS INDEX: 44.1 KG/M2 | WEIGHT: 315 LBS | HEIGHT: 71 IN | TEMPERATURE: 97.5 F | OXYGEN SATURATION: 95 % | HEART RATE: 106 BPM | DIASTOLIC BLOOD PRESSURE: 84 MMHG | SYSTOLIC BLOOD PRESSURE: 159 MMHG | RESPIRATION RATE: 18 BRPM

## 2024-09-22 LAB
ANION GAP SERPL CALCULATED.3IONS-SCNC: 8.4 MMOL/L (ref 5–15)
BASOPHILS # BLD AUTO: 0.04 10*3/MM3 (ref 0–0.2)
BASOPHILS NFR BLD AUTO: 0.5 % (ref 0–1.5)
BUN SERPL-MCNC: 24 MG/DL (ref 6–20)
BUN/CREAT SERPL: 21.6 (ref 7–25)
CALCIUM SPEC-SCNC: 8.7 MG/DL (ref 8.6–10.5)
CHLORIDE SERPL-SCNC: 98 MMOL/L (ref 98–107)
CO2 SERPL-SCNC: 28.6 MMOL/L (ref 22–29)
CREAT SERPL-MCNC: 1.11 MG/DL (ref 0.76–1.27)
DEPRECATED RDW RBC AUTO: 46.5 FL (ref 37–54)
EGFRCR SERPLBLD CKD-EPI 2021: 83.5 ML/MIN/1.73
EOSINOPHIL # BLD AUTO: 0.22 10*3/MM3 (ref 0–0.4)
EOSINOPHIL NFR BLD AUTO: 2.5 % (ref 0.3–6.2)
ERYTHROCYTE [DISTWIDTH] IN BLOOD BY AUTOMATED COUNT: 14.1 % (ref 12.3–15.4)
GLUCOSE SERPL-MCNC: 113 MG/DL (ref 65–99)
HCT VFR BLD AUTO: 43.3 % (ref 37.5–51)
HGB BLD-MCNC: 13.3 G/DL (ref 13–17.7)
IMM GRANULOCYTES # BLD AUTO: 0.03 10*3/MM3 (ref 0–0.05)
IMM GRANULOCYTES NFR BLD AUTO: 0.3 % (ref 0–0.5)
LYMPHOCYTES # BLD AUTO: 1.9 10*3/MM3 (ref 0.7–3.1)
LYMPHOCYTES NFR BLD AUTO: 22 % (ref 19.6–45.3)
MCH RBC QN AUTO: 27.9 PG (ref 26.6–33)
MCHC RBC AUTO-ENTMCNC: 30.7 G/DL (ref 31.5–35.7)
MCV RBC AUTO: 90.8 FL (ref 79–97)
MONOCYTES # BLD AUTO: 0.72 10*3/MM3 (ref 0.1–0.9)
MONOCYTES NFR BLD AUTO: 8.3 % (ref 5–12)
NEUTROPHILS NFR BLD AUTO: 5.72 10*3/MM3 (ref 1.7–7)
NEUTROPHILS NFR BLD AUTO: 66.4 % (ref 42.7–76)
NRBC BLD AUTO-RTO: 0 /100 WBC (ref 0–0.2)
PLATELET # BLD AUTO: 173 10*3/MM3 (ref 140–450)
PMV BLD AUTO: 10.9 FL (ref 6–12)
POTASSIUM SERPL-SCNC: 3.2 MMOL/L (ref 3.5–5.2)
POTASSIUM SERPL-SCNC: 3.7 MMOL/L (ref 3.5–5.2)
RBC # BLD AUTO: 4.77 10*6/MM3 (ref 4.14–5.8)
SODIUM SERPL-SCNC: 135 MMOL/L (ref 136–145)
WBC NRBC COR # BLD AUTO: 8.63 10*3/MM3 (ref 3.4–10.8)

## 2024-09-22 PROCEDURE — 84132 ASSAY OF SERUM POTASSIUM: CPT

## 2024-09-22 PROCEDURE — 85025 COMPLETE CBC W/AUTO DIFF WBC: CPT | Performed by: EMERGENCY MEDICINE

## 2024-09-22 PROCEDURE — 87205 SMEAR GRAM STAIN: CPT | Performed by: PHYSICIAN ASSISTANT

## 2024-09-22 PROCEDURE — 87077 CULTURE AEROBIC IDENTIFY: CPT | Performed by: PHYSICIAN ASSISTANT

## 2024-09-22 PROCEDURE — 87186 SC STD MICRODIL/AGAR DIL: CPT | Performed by: PHYSICIAN ASSISTANT

## 2024-09-22 PROCEDURE — 96366 THER/PROPH/DIAG IV INF ADDON: CPT

## 2024-09-22 PROCEDURE — 94761 N-INVAS EAR/PLS OXIMETRY MLT: CPT

## 2024-09-22 PROCEDURE — G0378 HOSPITAL OBSERVATION PER HR: HCPCS

## 2024-09-22 PROCEDURE — 80048 BASIC METABOLIC PNL TOTAL CA: CPT | Performed by: EMERGENCY MEDICINE

## 2024-09-22 PROCEDURE — 94799 UNLISTED PULMONARY SVC/PX: CPT

## 2024-09-22 PROCEDURE — 93005 ELECTROCARDIOGRAM TRACING: CPT

## 2024-09-22 PROCEDURE — 94664 DEMO&/EVAL PT USE INHALER: CPT

## 2024-09-22 PROCEDURE — 25010000002 AMPICILLIN-SULBACTAM PER 1.5 G: Performed by: EMERGENCY MEDICINE

## 2024-09-22 PROCEDURE — 87070 CULTURE OTHR SPECIMN AEROBIC: CPT | Performed by: PHYSICIAN ASSISTANT

## 2024-09-22 RX ORDER — HYDROCODONE BITARTRATE AND ACETAMINOPHEN 10; 325 MG/1; MG/1
1 TABLET ORAL EVERY 6 HOURS PRN
Qty: 12 TABLET | Refills: 0 | Status: SHIPPED | OUTPATIENT
Start: 2024-09-22

## 2024-09-22 RX ORDER — POTASSIUM CHLORIDE 1500 MG/1
40 TABLET, EXTENDED RELEASE ORAL EVERY 4 HOURS
Status: COMPLETED | OUTPATIENT
Start: 2024-09-22 | End: 2024-09-22

## 2024-09-22 RX ORDER — SILVER SULFADIAZINE 10 MG/G
1 CREAM TOPICAL
Qty: 25 G | Refills: 0 | Status: SHIPPED | OUTPATIENT
Start: 2024-09-23

## 2024-09-22 RX ORDER — SULFAMETHOXAZOLE/TRIMETHOPRIM 800-160 MG
1 TABLET ORAL EVERY 12 HOURS SCHEDULED
Qty: 9 TABLET | Refills: 0 | Status: SHIPPED | OUTPATIENT
Start: 2024-09-22 | End: 2024-09-27

## 2024-09-22 RX ORDER — HYDROCODONE BITARTRATE AND ACETAMINOPHEN 7.5; 325 MG/1; MG/1
1 TABLET ORAL EVERY 6 HOURS PRN
Qty: 12 TABLET | Refills: 0 | Status: SHIPPED | OUTPATIENT
Start: 2024-09-22 | End: 2024-09-22 | Stop reason: HOSPADM

## 2024-09-22 RX ORDER — POTASSIUM CHLORIDE 750 MG/1
10 TABLET, FILM COATED, EXTENDED RELEASE ORAL DAILY
Status: DISCONTINUED | OUTPATIENT
Start: 2024-09-23 | End: 2024-09-22 | Stop reason: HOSPADM

## 2024-09-22 RX ORDER — SULFAMETHOXAZOLE/TRIMETHOPRIM 800-160 MG
1 TABLET ORAL EVERY 12 HOURS SCHEDULED
Status: DISCONTINUED | OUTPATIENT
Start: 2024-09-22 | End: 2024-09-22 | Stop reason: HOSPADM

## 2024-09-22 RX ADMIN — POTASSIUM CHLORIDE 40 MEQ: 1500 TABLET, EXTENDED RELEASE ORAL at 09:17

## 2024-09-22 RX ADMIN — SULFAMETHOXAZOLE AND TRIMETHOPRIM 1 TABLET: 800; 160 TABLET ORAL at 13:35

## 2024-09-22 RX ADMIN — AMPICILLIN SODIUM AND SULBACTAM SODIUM 3 G: 2; 1 INJECTION, POWDER, FOR SOLUTION INTRAMUSCULAR; INTRAVENOUS at 02:15

## 2024-09-22 RX ADMIN — HYDROCHLOROTHIAZIDE 25 MG: 25 TABLET ORAL at 09:16

## 2024-09-22 RX ADMIN — HYDROCODONE BITARTRATE AND ACETAMINOPHEN 1 TABLET: 7.5; 325 TABLET ORAL at 02:14

## 2024-09-22 RX ADMIN — Medication 10 ML: at 09:09

## 2024-09-22 RX ADMIN — TORSEMIDE 100 MG: 100 TABLET ORAL at 09:16

## 2024-09-22 RX ADMIN — SILVER SULFADIAZINE 1 APPLICATION: 10 CREAM TOPICAL at 10:55

## 2024-09-22 RX ADMIN — PANTOPRAZOLE SODIUM 40 MG: 40 TABLET, DELAYED RELEASE ORAL at 09:17

## 2024-09-22 RX ADMIN — HYDROCODONE BITARTRATE AND ACETAMINOPHEN 1 TABLET: 7.5; 325 TABLET ORAL at 10:59

## 2024-09-22 RX ADMIN — POTASSIUM CHLORIDE 40 MEQ: 1500 TABLET, EXTENDED RELEASE ORAL at 04:51

## 2024-09-22 RX ADMIN — CARVEDILOL 25 MG: 25 TABLET, FILM COATED ORAL at 09:16

## 2024-09-22 RX ADMIN — AMPICILLIN SODIUM AND SULBACTAM SODIUM 3 G: 2; 1 INJECTION, POWDER, FOR SOLUTION INTRAMUSCULAR; INTRAVENOUS at 09:06

## 2024-09-22 RX ADMIN — APIXABAN 5 MG: 5 TABLET, FILM COATED ORAL at 09:16

## 2024-09-22 RX ADMIN — BUDESONIDE AND FORMOTEROL FUMARATE DIHYDRATE 2 PUFF: 160; 4.5 AEROSOL RESPIRATORY (INHALATION) at 07:54

## 2024-09-22 RX ADMIN — HYDRALAZINE HYDROCHLORIDE 25 MG: 25 TABLET ORAL at 09:16

## 2024-09-23 ENCOUNTER — TRANSITIONAL CARE MANAGEMENT TELEPHONE ENCOUNTER (OUTPATIENT)
Dept: CALL CENTER | Facility: HOSPITAL | Age: 44
End: 2024-09-23
Payer: MEDICARE

## 2024-09-24 LAB
BACTERIA SPEC AEROBE CULT: ABNORMAL
BACTERIA SPEC AEROBE CULT: ABNORMAL
GRAM STN SPEC: ABNORMAL
GRAM STN SPEC: ABNORMAL

## 2024-09-25 LAB
BACTERIA SPEC AEROBE CULT: NORMAL
BACTERIA SPEC AEROBE CULT: NORMAL

## 2024-09-25 RX ORDER — HYDROCHLOROTHIAZIDE 25 MG/1
25 TABLET ORAL DAILY
Qty: 60 TABLET | Refills: 0 | Status: SHIPPED | OUTPATIENT
Start: 2024-09-25

## 2024-09-26 LAB
QT INTERVAL: 393 MS
QTC INTERVAL: 448 MS

## 2024-09-30 RX ORDER — HYDRALAZINE HYDROCHLORIDE 25 MG/1
25 TABLET, FILM COATED ORAL 3 TIMES DAILY
Qty: 90 TABLET | Refills: 1 | Status: SHIPPED | OUTPATIENT
Start: 2024-09-30

## 2024-09-30 RX ORDER — ROPINIROLE 0.25 MG/1
TABLET, FILM COATED ORAL
Qty: 90 TABLET | Refills: 1 | Status: SHIPPED | OUTPATIENT
Start: 2024-09-30

## 2024-10-08 NOTE — TELEPHONE ENCOUNTER
Medication requested (name): MOUNJARO    Current medication dose: 5    Do you want to increase your dose or stay the same: INCREASE    [facility] where [facility] should be sent[facility]    Additional details provided by patient: NO    Best call back number: [phone]    Does the patient have less than a 3 day supply:  [x] Yes  [] No    For weight loss medication refill request please ask the patient the following questions as well:    What is your current weight 455    Are you experiencing any abdominal pain, nausea, or vomiting NO    Female patients: Are you currently pregnant or any chance of pregnancy NO    What dose are you currently taking 5    How long have you been on your current dose LITTLE OVER MONTH     When did you take the medication last 10.5.24    Next appointment date:   Future Appointments         Provider Department Center    10/10/2024 10:00 AM Halima Patel DO [facility]

## 2024-10-23 ENCOUNTER — TELEMEDICINE (OUTPATIENT)
Dept: FAMILY MEDICINE CLINIC | Facility: TELEHEALTH | Age: 44
End: 2024-10-23
Payer: MEDICARE

## 2024-10-23 DIAGNOSIS — J22 LOWER RESPIRATORY INFECTION (E.G., BRONCHITIS, PNEUMONIA, PNEUMONITIS, PULMONITIS): Primary | ICD-10-CM

## 2024-10-23 RX ORDER — BROMPHENIRAMINE MALEATE, PSEUDOEPHEDRINE HYDROCHLORIDE, AND DEXTROMETHORPHAN HYDROBROMIDE 2; 30; 10 MG/5ML; MG/5ML; MG/5ML
10 SYRUP ORAL 4 TIMES DAILY PRN
Qty: 200 ML | Refills: 0 | Status: SHIPPED | OUTPATIENT
Start: 2024-10-23

## 2024-10-23 RX ORDER — PREDNISONE 10 MG/1
TABLET ORAL
Qty: 21 TABLET | Refills: 0 | Status: SHIPPED | OUTPATIENT
Start: 2024-10-23

## 2024-10-23 RX ORDER — AZITHROMYCIN 250 MG/1
TABLET, FILM COATED ORAL
Qty: 6 TABLET | Refills: 0 | Status: SHIPPED | OUTPATIENT
Start: 2024-10-23

## 2024-10-23 NOTE — PROGRESS NOTES
You have chosen to receive care through a telehealth visit.  Do you consent to use a video/audio connection for your medical care today? Yes     CHIEF COMPLAINT  No chief complaint on file.        HPI  Farhat Hein is a 44 y.o. male  presents with complaint of several days history of cough with green sputum production, chest congestion, wheezing, headache.  Denies fever, shortness of air.    Home COVID test is negative.     Review of Systems  See HPI    Past Medical History:   Diagnosis Date    Allergic 1979    seasonal    Arthritis 2020    legs    Asthma 2024    Chronic obstructive pulmonary disease 2023    COPD (chronic obstructive pulmonary disease) 2011    GERD (gastroesophageal reflux disease) 2006    Hypertension 2006    Kidney stone 2010    Low back pain 2020    Obesity 1997    Pneumonia 2023    Renal insufficiency 2021    Stroke 2015    Substance abuse     Type 2 diabetes mellitus 2019    Urinary tract infection 03/15/2020       Family History   Problem Relation Age of Onset    Arthritis Mother     COPD Mother     Diabetes Mother     Hyperlipidemia Mother     Hypertension Mother     Alcohol abuse Father     Arthritis Father     Cancer Father         SKIN    Diabetes Father     Heart disease Father     Hyperlipidemia Father     Hypertension Father     Obesity Father     Arthritis Sister     Cancer Sister         BREAST    Diabetes Sister     Hyperlipidemia Sister     Heart disease Sister     Hypertension Sister     Obesity Sister     Sleep apnea Sister     Arthritis Brother     Cancer Brother         KIDNEY/BRAIN/SKIN    Hyperlipidemia Brother     Kidney disease Brother     Hypertension Brother     Alcohol abuse Brother     Mental illness Brother         schizophrenia    Cancer Sister         COLON    Diabetes Sister             Hyperlipidemia Sister     Hypertension Sister     Diabetes Brother     Heart disease  Brother     Hyperlipidemia Brother     Hypertension Brother     Alcohol abuse Brother     Cancer Maternal Grandmother     Cancer Maternal Uncle        Social History     Socioeconomic History    Marital status:    Tobacco Use    Smoking status: Former     Current packs/day: 0.00     Average packs/day: 2.0 packs/day for 10.4 years (20.8 ttl pk-yrs)     Types: Cigarettes     Start date: 1998     Quit date: 2008     Years since quittin.3     Passive exposure: Past    Smokeless tobacco: Never   Vaping Use    Vaping status: Never Used   Substance and Sexual Activity    Alcohol use: Not Currently     Alcohol/week: 12.0 standard drinks of alcohol     Types: 12 Cans of beer per week     Comment: PER DAY    Drug use: Yes     Frequency: 7.0 times per week     Types: Marijuana    Sexual activity: Yes     Partners: Female     Birth control/protection: Condom       Farhat Hein  reports that he quit smoking about 16 years ago. His smoking use included cigarettes. He started smoking about 26 years ago. He has a 20.8 pack-year smoking history. He has been exposed to tobacco smoke. He has never used smokeless tobacco.               There were no vitals taken for this visit.    PHYSICAL EXAM  Physical Exam   Constitutional: He is oriented to person, place, and time. He appears well-developed and well-nourished. He does not have a sickly appearance. He does not appear ill.   HENT:   Head: Normocephalic and atraumatic.   Pulmonary/Chest: Effort normal.  No respiratory distress (persistent deep tight cough during visit; no audible wheezing noted).  Neurological: He is alert and oriented to person, place, and time.           Diagnoses and all orders for this visit:    1. Lower respiratory infection (e.g., bronchitis, pneumonia, pneumonitis, pulmonitis) (Primary)  -     azithromycin (Zithromax Z-Vincent) 250 MG tablet; Take 2 tablets by mouth on day 1, then 1 tablet daily on days 2-5  Dispense: 6 tablet; Refill: 0  -      predniSONE (DELTASONE) 10 MG (21) dose pack; Use as directed on package  Dispense: 21 tablet; Refill: 0  -     brompheniramine-pseudoephedrine-DM 30-2-10 MG/5ML syrup; Take 10 mL by mouth 4 (Four) Times a Day As Needed for Congestion, Cough or Allergies.  Dispense: 200 mL; Refill: 0    --take medications as prescribed  --increase fluids, rest as needed, tylenol or ibuprofen for pain  --f/u in 5-7 days if no improvement        FOLLOW-UP  As discussed during visit with PCP/Virtual Care if no improvement or Urgent Care/Emergency Department if worsening of symptoms    Patient verbalizes understanding of medication dosage, comfort measures, instructions for treatment and follow-up.    Kianna Loja, SAMI  10/23/2024  15:55 EDT    Mode of Visit: Video  Location of patient: Home  Location of provider: Home  You have chosen to receive care through a telehealth visit.  The patient has signed the video visit consent form.  The visit included audio and video interaction. No technical issues occurred during this visit.      Note Disclaimer: At [facility], we believe that sharing information builds trust and better   relationships. You are receiving this note because you recently visited [facility]. It is possible you   will see health information before a provider has talked with you about it. This kind of information can   be easy to misunderstand. To help you fully understand what it means for your health, we urge you to   discuss this note with your provider.

## 2024-10-24 RX ORDER — TORSEMIDE 100 MG/1
100 TABLET ORAL DAILY
Qty: 90 TABLET | Refills: 3 | OUTPATIENT
Start: 2024-10-24

## 2024-10-28 RX ORDER — TORSEMIDE 100 MG/1
100 TABLET ORAL DAILY
Qty: 90 TABLET | Refills: 0 | Status: SHIPPED | OUTPATIENT
Start: 2024-10-28

## 2024-10-28 NOTE — TELEPHONE ENCOUNTER
Rx Refill Note  Requested Prescriptions     Pending Prescriptions Disp Refills    torsemide (DEMADEX) 100 MG tablet [Pharmacy Med Name: TORSEMIDE 100 MG TABLET] 90 tablet 3     Sig: TAKE 1 TABLET BY MOUTH DAILY      Last office visit with prescribing clinician: 4/17/2024   Last telemedicine visit with prescribing clinician: Visit date not found   Next office visit with prescribing clinician: 10/29/2024     Basic Metabolic Panel (09/22/2024 02:28)   CBC & Differential (09/22/2024 02:28)                       Would you like a call back once the refill request has been completed: [] Yes [] No    If the office needs to give you a call back, can they leave a voicemail: [] Yes [] No    Danya Rosales CMA  10/28/24, 12:19 EDT

## 2024-10-31 RX ORDER — POTASSIUM CHLORIDE 750 MG/1
10 CAPSULE, EXTENDED RELEASE ORAL DAILY
Qty: 90 CAPSULE | Refills: 3 | Status: SHIPPED | OUTPATIENT
Start: 2024-10-31

## 2024-11-06 ENCOUNTER — TELEMEDICINE (OUTPATIENT)
Dept: FAMILY MEDICINE CLINIC | Facility: TELEHEALTH | Age: 44
End: 2024-11-06
Payer: MEDICARE

## 2024-11-06 DIAGNOSIS — L03.116 CELLULITIS OF LEFT LEG: Primary | ICD-10-CM

## 2024-11-06 PROCEDURE — 99213 OFFICE O/P EST LOW 20 MIN: CPT | Performed by: NURSE PRACTITIONER

## 2024-11-06 RX ORDER — SULFAMETHOXAZOLE AND TRIMETHOPRIM 800; 160 MG/1; MG/1
1 TABLET ORAL 2 TIMES DAILY
Qty: 20 TABLET | Refills: 0 | Status: SHIPPED | OUTPATIENT
Start: 2024-11-06 | End: 2024-11-16

## 2024-11-06 RX ORDER — CIPROFLOXACIN 500 MG/1
500 TABLET, FILM COATED ORAL 2 TIMES DAILY
Qty: 20 TABLET | Refills: 0 | Status: SHIPPED | OUTPATIENT
Start: 2024-11-06 | End: 2024-11-16

## 2024-11-07 NOTE — PROGRESS NOTES
You have chosen to receive care through a telehealth visit.  Do you consent to use a video/audio connection for your medical care today? Yes     CHIEF COMPLAINT  Chief Complaint   Patient presents with    Cellulitis         HPI  Farhat Hein is a 44 y.o. male  presents with complaint of erythema and edema to left lower leg.  He is a diabetic and states that this happens often    Review of Systems   Skin:  Positive for wound (erythema and edema to left lower leg).   All other systems reviewed and are negative.      Past Medical History:   Diagnosis Date    Allergic 1979    seasonal    Arthritis 2020    legs    Asthma 2024    Chronic obstructive pulmonary disease 2023    COPD (chronic obstructive pulmonary disease) 2011    GERD (gastroesophageal reflux disease) 2006    Hypertension 2006    Kidney stone 2010    Low back pain 2020    Obesity 1997    Pneumonia 2023    Renal insufficiency 2021    Stroke 2015    Substance abuse     Type 2 diabetes mellitus 2019    Urinary tract infection 03/15/2020       Family History   Problem Relation Age of Onset    Arthritis Mother     COPD Mother     Diabetes Mother     Hyperlipidemia Mother     Hypertension Mother     Alcohol abuse Father     Arthritis Father     Cancer Father         SKIN    Diabetes Father     Heart disease Father     Hyperlipidemia Father     Hypertension Father     Obesity Father     Arthritis Sister     Cancer Sister         BREAST    Diabetes Sister     Hyperlipidemia Sister     Heart disease Sister     Hypertension Sister     Obesity Sister     Sleep apnea Sister     Arthritis Brother     Cancer Brother         KIDNEY/BRAIN/SKIN    Hyperlipidemia Brother     Kidney disease Brother     Hypertension Brother     Alcohol abuse Brother     Mental illness Brother         schizophrenia    Cancer Sister         COLON    Diabetes Sister             Hyperlipidemia Sister      Hypertension Sister     Diabetes Brother     Heart disease Brother     Hyperlipidemia Brother     Hypertension Brother     Alcohol abuse Brother     Cancer Maternal Grandmother     Cancer Maternal Uncle        Social History     Socioeconomic History    Marital status:    Tobacco Use    Smoking status: Former     Current packs/day: 0.00     Average packs/day: 2.0 packs/day for 10.4 years (20.8 ttl pk-yrs)     Types: Cigarettes     Start date: 1998     Quit date: 2008     Years since quittin.3     Passive exposure: Past    Smokeless tobacco: Never   Vaping Use    Vaping status: Never Used   Substance and Sexual Activity    Alcohol use: Not Currently     Alcohol/week: 12.0 standard drinks of alcohol     Types: 12 Cans of beer per week     Comment: PER DAY    Drug use: Yes     Frequency: 7.0 times per week     Types: Marijuana    Sexual activity: Yes     Partners: Female     Birth control/protection: Condom       Farhat Hein  reports that he quit smoking about 16 years ago. His smoking use included cigarettes. He started smoking about 26 years ago. He has a 20.8 pack-year smoking history. He has been exposed to tobacco smoke. He has never used smokeless tobacco.            There were no vitals taken for this visit.    PHYSICAL EXAM  Physical Exam   Constitutional: He appears well-developed and well-nourished.   HENT:   Head: Normocephalic.   Eyes: Pupils are equal, round, and reactive to light.   Pulmonary/Chest: Effort normal.   Musculoskeletal: Normal range of motion.   Neurological: He is alert.   Skin: There is erythema (left lower leg).   Edema left lower leg   Psychiatric: He has a normal mood and affect.       Results for orders placed or performed during the hospital encounter of 24   POC Lactate    Collection Time: 24  5:35 PM    Specimen: Blood   Result Value Ref Range    Lactate 1.0 0.3 - 2.0 mmol/L   Comprehensive Metabolic Panel    Collection Time: 24  6:00 PM     Specimen: Blood   Result Value Ref Range    Glucose 104 (H) 65 - 99 mg/dL    BUN 18 6 - 20 mg/dL    Creatinine 0.92 0.76 - 1.27 mg/dL    Sodium 136 136 - 145 mmol/L    Potassium 3.9 3.5 - 5.2 mmol/L    Chloride 99 98 - 107 mmol/L    CO2 27.9 22.0 - 29.0 mmol/L    Calcium 9.2 8.6 - 10.5 mg/dL    Total Protein 8.5 6.0 - 8.5 g/dL    Albumin 4.2 3.5 - 5.2 g/dL    ALT (SGPT) 22 1 - 41 U/L    AST (SGOT) 27 1 - 40 U/L    Alkaline Phosphatase 76 39 - 117 U/L    Total Bilirubin 0.8 0.0 - 1.2 mg/dL    Globulin 4.3 gm/dL    A/G Ratio 1.0 g/dL    BUN/Creatinine Ratio 19.6 7.0 - 25.0    Anion Gap 9.1 5.0 - 15.0 mmol/L    eGFR 104.5 >60.0 mL/min/1.73   CBC Auto Differential    Collection Time: 09/20/24  6:00 PM    Specimen: Blood   Result Value Ref Range    WBC 19.43 (H) 3.40 - 10.80 10*3/mm3    RBC 5.21 4.14 - 5.80 10*6/mm3    Hemoglobin 14.8 13.0 - 17.7 g/dL    Hematocrit 47.1 37.5 - 51.0 %    MCV 90.4 79.0 - 97.0 fL    MCH 28.4 26.6 - 33.0 pg    MCHC 31.4 (L) 31.5 - 35.7 g/dL    RDW 13.8 12.3 - 15.4 %    RDW-SD 45.9 37.0 - 54.0 fl    MPV 11.3 6.0 - 12.0 fL    Platelets 229 140 - 450 10*3/mm3    Neutrophil % 90.3 (H) 42.7 - 76.0 %    Lymphocyte % 4.7 (L) 19.6 - 45.3 %    Monocyte % 4.0 (L) 5.0 - 12.0 %    Eosinophil % 0.2 (L) 0.3 - 6.2 %    Basophil % 0.2 0.0 - 1.5 %    Immature Grans % 0.6 (H) 0.0 - 0.5 %    Neutrophils, Absolute 17.54 (H) 1.70 - 7.00 10*3/mm3    Lymphocytes, Absolute 0.92 0.70 - 3.10 10*3/mm3    Monocytes, Absolute 0.77 0.10 - 0.90 10*3/mm3    Eosinophils, Absolute 0.04 0.00 - 0.40 10*3/mm3    Basophils, Absolute 0.04 0.00 - 0.20 10*3/mm3    Immature Grans, Absolute 0.12 (H) 0.00 - 0.05 10*3/mm3    nRBC 0.0 0.0 - 0.2 /100 WBC   Duplex Venous Lower Extremity - Bilateral CAR    Collection Time: 09/20/24  6:40 PM   Result Value Ref Range    Right Common Femoral Spont Y     Right Common Femoral Competent Y     Right Common Femoral Phasic Y     Right Common Femoral Compress C     Right Common Femoral Augment Y      Right Saphenofemoral Junction Compress C     Right Proximal Femoral Compress C     Right Mid Femoral Spont Y     Right Mid Femoral Competent Y     Right Mid Femoral Phasic Y     Right Mid Femoral Compress C     Right Mid Femoral Augment Y     Right Distal Femoral Compress C     Right Popliteal Spont Y     Right Popliteal Competent Y     Right Popliteal Phasic Y     Right Popliteal Compress C     Right Popliteal Augment Y     Right Posterior Tibial Compress C     Right Peroneal Compress C     Right Gastronemius Compress C     Right Greater Saph AK Compress C     Right Greater Saph BK Compress C     Right Lesser Saph Compress C     Left Common Femoral Spont Y     Left Common Femoral Competent Y     Left Common Femoral Phasic Y     Left Common Femoral Compress C     Left Common Femoral Augment Y     Left Saphenofemoral Junction Compress C     Left Proximal Femoral Compress C     Left Mid Femoral Spont Y     Left Mid Femoral Competent Y     Left Mid Femoral Phasic Y     Left Mid Femoral Compress C     Left Mid Femoral Augment Y     Left Distal Femoral Compress C     Left Popliteal Spont Y     Left Popliteal Competent Y     Left Popliteal Phasic Y     Left Popliteal Compress C     Left Popliteal Augment Y     Left Posterior Tibial Compress C     Left Peroneal Compress C     Left Gastronemius Compress C     Left Greater Saph AK Compress C     Left Greater Saph BK Compress C     Left Lesser Saph Compress C     BH CV VAS PRELIMINARY FINDINGS SCRIPTING 1.0    Blood Culture - Blood, Arm, Left    Collection Time: 09/20/24  6:48 PM    Specimen: Arm, Left; Blood   Result Value Ref Range    Blood Culture No growth at 5 days    Blood Culture - Blood, Arm, Left    Collection Time: 09/20/24  7:02 PM    Specimen: Arm, Left; Blood   Result Value Ref Range    Blood Culture No growth at 5 days    CBC Auto Differential    Collection Time: 09/21/24  4:31 AM    Specimen: Blood   Result Value Ref Range    WBC 13.92 (H) 3.40 - 10.80 10*3/mm3     RBC 5.09 4.14 - 5.80 10*6/mm3    Hemoglobin 14.2 13.0 - 17.7 g/dL    Hematocrit 46.9 37.5 - 51.0 %    MCV 92.1 79.0 - 97.0 fL    MCH 27.9 26.6 - 33.0 pg    MCHC 30.3 (L) 31.5 - 35.7 g/dL    RDW 14.2 12.3 - 15.4 %    RDW-SD 47.5 37.0 - 54.0 fl    MPV 10.9 6.0 - 12.0 fL    Platelets 194 140 - 450 10*3/mm3    Neutrophil % 83.7 (H) 42.7 - 76.0 %    Lymphocyte % 7.8 (L) 19.6 - 45.3 %    Monocyte % 7.3 5.0 - 12.0 %    Eosinophil % 0.4 0.3 - 6.2 %    Basophil % 0.4 0.0 - 1.5 %    Immature Grans % 0.4 0.0 - 0.5 %    Neutrophils, Absolute 11.66 (H) 1.70 - 7.00 10*3/mm3    Lymphocytes, Absolute 1.09 0.70 - 3.10 10*3/mm3    Monocytes, Absolute 1.01 (H) 0.10 - 0.90 10*3/mm3    Eosinophils, Absolute 0.05 0.00 - 0.40 10*3/mm3    Basophils, Absolute 0.05 0.00 - 0.20 10*3/mm3    Immature Grans, Absolute 0.06 (H) 0.00 - 0.05 10*3/mm3    nRBC 0.0 0.0 - 0.2 /100 WBC   Basic Metabolic Panel    Collection Time: 09/21/24  4:31 AM    Specimen: Blood   Result Value Ref Range    Glucose 93 65 - 99 mg/dL    BUN 22 (H) 6 - 20 mg/dL    Creatinine 1.00 0.76 - 1.27 mg/dL    Sodium 136 136 - 145 mmol/L    Potassium 3.8 3.5 - 5.2 mmol/L    Chloride 101 98 - 107 mmol/L    CO2 24.1 22.0 - 29.0 mmol/L    Calcium 8.7 8.6 - 10.5 mg/dL    BUN/Creatinine Ratio 22.0 7.0 - 25.0    Anion Gap 10.9 5.0 - 15.0 mmol/L    eGFR 94.6 >60.0 mL/min/1.73   Basic Metabolic Panel    Collection Time: 09/22/24  2:28 AM    Specimen: Blood   Result Value Ref Range    Glucose 113 (H) 65 - 99 mg/dL    BUN 24 (H) 6 - 20 mg/dL    Creatinine 1.11 0.76 - 1.27 mg/dL    Sodium 135 (L) 136 - 145 mmol/L    Potassium 3.2 (L) 3.5 - 5.2 mmol/L    Chloride 98 98 - 107 mmol/L    CO2 28.6 22.0 - 29.0 mmol/L    Calcium 8.7 8.6 - 10.5 mg/dL    BUN/Creatinine Ratio 21.6 7.0 - 25.0    Anion Gap 8.4 5.0 - 15.0 mmol/L    eGFR 83.5 >60.0 mL/min/1.73   CBC Auto Differential    Collection Time: 09/22/24  2:28 AM    Specimen: Blood   Result Value Ref Range    WBC 8.63 3.40 - 10.80 10*3/mm3     RBC 4.77 4.14 - 5.80 10*6/mm3    Hemoglobin 13.3 13.0 - 17.7 g/dL    Hematocrit 43.3 37.5 - 51.0 %    MCV 90.8 79.0 - 97.0 fL    MCH 27.9 26.6 - 33.0 pg    MCHC 30.7 (L) 31.5 - 35.7 g/dL    RDW 14.1 12.3 - 15.4 %    RDW-SD 46.5 37.0 - 54.0 fl    MPV 10.9 6.0 - 12.0 fL    Platelets 173 140 - 450 10*3/mm3    Neutrophil % 66.4 42.7 - 76.0 %    Lymphocyte % 22.0 19.6 - 45.3 %    Monocyte % 8.3 5.0 - 12.0 %    Eosinophil % 2.5 0.3 - 6.2 %    Basophil % 0.5 0.0 - 1.5 %    Immature Grans % 0.3 0.0 - 0.5 %    Neutrophils, Absolute 5.72 1.70 - 7.00 10*3/mm3    Lymphocytes, Absolute 1.90 0.70 - 3.10 10*3/mm3    Monocytes, Absolute 0.72 0.10 - 0.90 10*3/mm3    Eosinophils, Absolute 0.22 0.00 - 0.40 10*3/mm3    Basophils, Absolute 0.04 0.00 - 0.20 10*3/mm3    Immature Grans, Absolute 0.03 0.00 - 0.05 10*3/mm3    nRBC 0.0 0.0 - 0.2 /100 WBC   ECG 12 Lead Electrolyte Imbalance    Collection Time: 09/22/24  4:06 AM   Result Value Ref Range    QT Interval 393 ms    QTC Interval 448 ms   Wound Culture - Wound, Leg, Right    Collection Time: 09/22/24 12:57 PM    Specimen: Leg, Right; Wound   Result Value Ref Range    Wound Culture Rare growth Escherichia coli (A)     Wound Culture Scant growth (1+) Normal Skin Barbara     Gram Stain Moderate (3+) WBCs per low power field     Gram Stain No organisms seen        Susceptibility    Escherichia coli - QUINN*     Amoxicillin + Clavulanate  Susceptible ug/ml     Ampicillin  Susceptible ug/ml     Ampicillin + Sulbactam  Susceptible ug/ml     Cefepime  Susceptible ug/ml     Ceftazidime  Susceptible ug/ml     Ceftriaxone  Susceptible ug/ml     Gentamicin  Susceptible ug/ml     Levofloxacin  Susceptible ug/ml     Piperacillin + Tazobactam  Susceptible ug/ml     Tetracycline  Susceptible ug/ml     Trimethoprim + Sulfamethoxazole  Susceptible ug/ml     * Cefazolin sensitivity will not be reported for Enterobacteriaceae in non-urine isolates. If cefazolin is preferred, please call the microbiology lab  to request an E-test.  With the exception of urinary-sourced infections, aminoglycosides should not be used as monotherapy.   Potassium    Collection Time: 09/22/24 12:58 PM    Specimen: Arm, Right; Blood   Result Value Ref Range    Potassium 3.7 3.5 - 5.2 mmol/L       Diagnoses and all orders for this visit:    1. Cellulitis of left leg (Primary)  -     sulfamethoxazole-trimethoprim (Bactrim DS) 800-160 MG per tablet; Take 1 tablet by mouth 2 (Two) Times a Day for 10 days.  Dispense: 20 tablet; Refill: 0  -     ciprofloxacin (Cipro) 500 MG tablet; Take 1 tablet by mouth 2 (Two) Times a Day for 10 days.  Dispense: 20 tablet; Refill: 0          FOLLOW-UP  As discussed during visit with PCP/[facility] if no improvement or Urgent Care/Emergency Department if worsening of symptoms    Patient verbalizes understanding of medication dosage, comfort measures, instructions for treatment and follow-up.    Marivel Laird, SAMI  11/06/2024  23:23 EST    Mode of Visit: Video  Location of patient: -HOME-  Location of provider: +HOME+  You have chosen to receive care through a telehealth visit.  The patient has signed the video visit consent form.  The visit included audio and video interaction. No technical issues occurred during this visit.      Note Disclaimer: At [facility], we believe that sharing information builds trust and better   relationships. You are receiving this note because you recently visited [facility]. It is possible you   will see health information before a provider has talked with you about it. This kind of information can   be easy to misunderstand. To help you fully understand what it means for your health, we urge you to   discuss this note with your provider.

## 2024-11-25 RX ORDER — HYDROCHLOROTHIAZIDE 25 MG/1
25 TABLET ORAL DAILY
Qty: 60 TABLET | Refills: 0 | Status: SHIPPED | OUTPATIENT
Start: 2024-11-25

## 2024-11-25 NOTE — TELEPHONE ENCOUNTER
Rx Refill Note  Requested Prescriptions     Pending Prescriptions Disp Refills    hydroCHLOROthiazide 25 MG tablet [Pharmacy Med Name: hydroCHLOROthiazide 25 MG TABLET] 60 tablet 0     Sig: TAKE 1 TABLET BY MOUTH DAILY      Last office visit with prescribing clinician: 4/17/2024   Last telemedicine visit with prescribing clinician: Visit date not found   Next office visit with prescribing clinician: Visit date not found        Lipid Panel (12/22/2023 09:12)                  Would you like a call back once the refill request has been completed: [] Yes [] No    If the office needs to give you a call back, can they leave a voicemail: [] Yes [] No    Claudia Jiménez, RT  11/25/24, 11:18 EST

## 2024-12-04 ENCOUNTER — TELEMEDICINE (OUTPATIENT)
Dept: FAMILY MEDICINE CLINIC | Facility: TELEHEALTH | Age: 44
End: 2024-12-04
Payer: MEDICARE

## 2024-12-04 DIAGNOSIS — J11.1 INFLUENZA: Primary | ICD-10-CM

## 2024-12-04 PROCEDURE — 99213 OFFICE O/P EST LOW 20 MIN: CPT | Performed by: NURSE PRACTITIONER

## 2024-12-04 RX ORDER — LOPERAMIDE HYDROCHLORIDE 2 MG/1
2 TABLET ORAL 4 TIMES DAILY PRN
Qty: 20 TABLET | Refills: 0 | Status: SHIPPED | OUTPATIENT
Start: 2024-12-04

## 2024-12-04 RX ORDER — BROMPHENIRAMINE MALEATE, PSEUDOEPHEDRINE HYDROCHLORIDE, AND DEXTROMETHORPHAN HYDROBROMIDE 2; 30; 10 MG/5ML; MG/5ML; MG/5ML
10 SYRUP ORAL 4 TIMES DAILY PRN
Qty: 200 ML | Refills: 0 | Status: SHIPPED | OUTPATIENT
Start: 2024-12-04

## 2024-12-04 RX ORDER — OSELTAMIVIR PHOSPHATE 75 MG/1
75 CAPSULE ORAL 2 TIMES DAILY
Qty: 10 CAPSULE | Refills: 0 | Status: SHIPPED | OUTPATIENT
Start: 2024-12-04 | End: 2024-12-09

## 2024-12-04 RX ORDER — ONDANSETRON 8 MG/1
8 TABLET, ORALLY DISINTEGRATING ORAL EVERY 8 HOURS PRN
Qty: 21 TABLET | Refills: 0 | Status: SHIPPED | OUTPATIENT
Start: 2024-12-04

## 2024-12-04 NOTE — PROGRESS NOTES
You have chosen to receive care through a telehealth visit.  Do you consent to use a video/audio connection for your medical care today? Yes     Patient or patient representative verbalized consent for the use of Ambient Listening during the visit with  SAMI Zhou for chart documentation. 12/4/2024  09:51 EST    CHIEF COMPLAINT  No chief complaint on file.        HPI  Farhat Hein is a 44 y.o. male  presents with complaint of     History of Present Illness  The patient is a 44-year-old male who presents via virtual visit with sore throat, vomiting and diarrhea.    He began experiencing symptoms yesterday, which include a mild cough, congestion, severe headache, diarrhea, and vomiting. He also reports feeling weak. He conducted a home COVID-19 test this morning, which returned a negative result. He denies having a fever.    ALLERGIES  He is allergic to PROMETHAZINE, VANCOMYCIN, CALAMINE, and ZINC OXIDE.       Review of Systems  See HPI    Past Medical History:   Diagnosis Date    Allergic 1979    seasonal    Arthritis 01/05/2020    legs    Asthma 01/2024    Chronic obstructive pulmonary disease 07/17/2023    COPD (chronic obstructive pulmonary disease) 04/01/2011    GERD (gastroesophageal reflux disease) 06/06/2006    Hypertension 06/06/2006    Kidney stone 08/08/2010    Low back pain 01/01/2020    Obesity 01/21/1997    Pneumonia 11/2023    Renal insufficiency 03/2021    Stroke 01/21/2015 01/21/2016    Substance abuse     Type 2 diabetes mellitus 07/27/2019    Urinary tract infection 03/15/2020       Family History   Problem Relation Age of Onset    Arthritis Mother     COPD Mother     Diabetes Mother     Hyperlipidemia Mother     Hypertension Mother     Alcohol abuse Father     Arthritis Father     Cancer Father         SKIN    Diabetes Father     Heart disease Father     Hyperlipidemia Father     Hypertension Father     Obesity Father     Arthritis Sister     Cancer Sister         BREAST     Diabetes Sister     Hyperlipidemia Sister     Heart disease Sister     Hypertension Sister     Obesity Sister     Sleep apnea Sister     Arthritis Brother     Cancer Brother         KIDNEY/BRAIN/SKIN    Hyperlipidemia Brother     Kidney disease Brother     Hypertension Brother     Alcohol abuse Brother     Mental illness Brother         schizophrenia    Cancer Sister         COLON    Diabetes Sister             Hyperlipidemia Sister     Hypertension Sister     Diabetes Brother     Heart disease Brother     Hyperlipidemia Brother     Hypertension Brother     Alcohol abuse Brother     Cancer Maternal Grandmother     Cancer Maternal Uncle        Social History     Socioeconomic History    Marital status:    Tobacco Use    Smoking status: Former     Current packs/day: 0.00     Average packs/day: 2.0 packs/day for 10.4 years (20.8 ttl pk-yrs)     Types: Cigarettes     Start date: 1998     Quit date: 2008     Years since quittin.4     Passive exposure: Past    Smokeless tobacco: Never   Vaping Use    Vaping status: Never Used   Substance and Sexual Activity    Alcohol use: Not Currently     Alcohol/week: 12.0 standard drinks of alcohol     Types: 12 Cans of beer per week     Comment: PER DAY    Drug use: Yes     Frequency: 7.0 times per week     Types: Marijuana    Sexual activity: Yes     Partners: Female     Birth control/protection: Condom       Farhat Hein  reports that he quit smoking about 16 years ago. His smoking use included cigarettes. He started smoking about 26 years ago. He has a 20.8 pack-year smoking history. He has been exposed to tobacco smoke. He has never used smokeless tobacco.             There were no vitals taken for this visit.    PHYSICAL EXAM  Physical Exam   Constitutional: He is oriented to person, place, and time. He appears well-developed and well-nourished. He does not have a sickly appearance. He does not appear ill.   HENT:   Head: Normocephalic and  atraumatic.   Pulmonary/Chest: Effort normal.  No respiratory distress (persistent cough during visit).  Neurological: He is alert and oriented to person, place, and time.           Diagnoses and all orders for this visit:    1. Influenza (Primary)  -     oseltamivir (Tamiflu) 75 MG capsule; Take 1 capsule by mouth 2 (Two) Times a Day for 5 days.  Dispense: 10 capsule; Refill: 0  -     brompheniramine-pseudoephedrine-DM 30-2-10 MG/5ML syrup; Take 10 mL by mouth 4 (Four) Times a Day As Needed for Congestion, Cough or Allergies.  Dispense: 200 mL; Refill: 0  -     ondansetron ODT (ZOFRAN-ODT) 8 MG disintegrating tablet; Place 1 tablet on the tongue Every 8 (Eight) Hours As Needed for Nausea or Vomiting.  Dispense: 21 tablet; Refill: 0  -     loperamide (Imodium A-D) 2 MG tablet; Take 1 tablet by mouth 4 (Four) Times a Day As Needed for Diarrhea.  Dispense: 20 tablet; Refill: 0        Assessment & Plan  1. Influenza.  He presents with sore throat, vomiting, diarrhea, cough, congestion, headache, and weakness, which are consistent with influenza. He tested negative for COVID-19 this morning but is advised to recheck for COVID-19 tomorrow as some people do not test positive until day two or three. Tamiflu has been prescribed, to be taken twice daily for 5 days. Bromfed DM, 2 teaspoons every 6 hours as needed, has been prescribed for cough and congestion. Zofran has been prescribed for nausea and vomiting, to be taken every 8 hours. Imodium has been prescribed for diarrhea, to be taken every 6 hours. If his COVID-19 test returns positive, he is to inform via [facility], and Paxlovid will be prescribed in place of Tamiflu.           FOLLOW-UP  As discussed during visit with PCP/[facility] if no improvement or Urgent Care/Emergency Department if worsening of symptoms    Patient verbalizes understanding of medication dosage, comfort measures, instructions for treatment and follow-up.    Kianna Loja, APRN  12/04/2024  09:51  EST    Mode of Visit: Video  Location of patient: -HOME-  Location of provider: +HOME+  You have chosen to receive care through a telehealth visit.  The patient has signed the video visit consent form.  The visit included audio and video interaction. No technical issues occurred during this visit.      Note Disclaimer: At [facility], we believe that sharing information builds trust and better   relationships. You are receiving this note because you recently visited [facility]. It is possible you   will see health information before a provider has talked with you about it. This kind of information can   be easy to misunderstand. To help you fully understand what it means for your health, we urge you to   discuss this note with your provider.

## 2024-12-04 NOTE — PATIENT INSTRUCTIONS
"Influenza, Adult  Influenza, also called \"the flu,\" is a viral infection that mainly affects the respiratory tract. This includes the lungs, nose, and throat. The flu spreads easily from person to person (is contagious). It causes common cold symptoms, along with high fever and body aches.  What are the causes?  This condition is caused by the influenza virus. You can get the virus by:  Breathing in droplets that are in the air from an infected person's cough or sneeze.  Touching something that has the virus on it (has been contaminated) and then touching your mouth, nose, or eyes.  What increases the risk?  The following factors may make you more likely to get the flu:  Not washing or sanitizing your hands often.  Having close contact with many people during cold and flu season.  Touching your mouth, eyes, or nose without first washing or sanitizing your hands.  Not getting an annual flu shot.  You may have a higher risk for the flu, including serious problems, such as a lung infection (pneumonia), if you:  Are older than 65.  Are pregnant.  Have a weakened disease-fighting system (immune system). This includes people who have HIV or AIDS, are on chemotherapy, or are taking medicines that reduce (suppress) the immune system.  Have a long-term (chronic) illness, such as heart disease, kidney disease, diabetes, or lung disease.  Have a liver disorder.  Are severely overweight (morbidly obese).  Have anemia.  Have asthma.  What are the signs or symptoms?  Symptoms of this condition usually begin suddenly and last 4-14 days. These may include:  Fever and chills.  Headaches, body aches, or muscle aches.  Sore throat.  Cough.  Runny or stuffy (congested) nose.  Chest discomfort.  Poor appetite.  Weakness or fatigue.  Dizziness.  Nausea or vomiting.  How is this diagnosed?  This condition may be diagnosed based on:  Your symptoms and medical history.  A physical exam.  Swabbing your nose or throat and testing the fluid " for the influenza virus.  How is this treated?  If the flu is diagnosed early, you can be treated with antiviral medicine that is given by mouth (orally) or through an IV. This can help reduce how severe the illness is and how long it lasts.  Taking care of yourself at home can help relieve symptoms. Your health care provider may recommend:  Taking over-the-counter medicines.  Drinking plenty of fluids.  In many cases, the flu goes away on its own. If you have severe symptoms or complications, you may be treated in a hospital.  Follow these instructions at home:  Activity  Rest as needed and get plenty of sleep.  Stay home from work or school as told by your health care provider. Unless you are visiting your health care provider, avoid leaving home until your fever has been gone for 24 hours without taking medicine.  Eating and drinking  Take an oral rehydration solution (ORS). This is a drink that is sold at pharmacies and retail stores.  Drink enough fluid to keep your urine pale yellow.  Drink clear fluids in small amounts as you are able. Clear fluids include water, ice chips, fruit juice mixed with water, and low-calorie sports drinks.  Eat bland, easy-to-digest foods in small amounts as you are able. These foods include bananas, applesauce, rice, lean meats, toast, and crackers.  Avoid drinking fluids that contain a lot of sugar or caffeine, such as energy drinks, regular sports drinks, and soda.  Avoid alcohol.  Avoid spicy or fatty foods.  General instructions         Take over-the-counter and prescription medicines only as told by your health care provider.  Use a cool mist humidifier to add humidity to the air in your home. This can make it easier to breathe.  When using a cool mist humidifier, clean it daily. Empty the water and replace it with clean water.  Cover your mouth and nose when you cough or sneeze.  Wash your hands with soap and water often and for at least 20 seconds, especially after you  "cough or sneeze. If soap and water are not available, use alcohol-based hand .  Keep all follow-up visits. This is important.  How is this prevented?    Get an annual flu shot. This is usually available in late summer, fall, or winter. Ask your health care provider when you should get your flu shot.  Avoid contact with people who are sick during cold and flu season. This is generally fall and winter.  Contact a health care provider if:  You develop new symptoms.  You have:  Chest pain.  Diarrhea.  A fever.  Your cough gets worse.  You produce more mucus.  You feel nauseous or you vomit.  Get help right away if you:  Develop shortness of breath or have difficulty breathing.  Have skin or nails that turn a bluish color.  Have severe pain or stiffness in your neck.  Develop a sudden headache or sudden pain in your face or ear.  Cannot eat or drink without vomiting.  These symptoms may represent a serious problem that is an emergency. Do not wait to see if the symptoms will go away. Get medical help right away. Call your local emergency services (911 in the [place] Do not drive yourself to the hospital.  Summary  Influenza, also called \"the flu,\" is a viral infection that primarily affects your respiratory tract.  Symptoms of the flu usually begin suddenly and last 4-14 days.  Getting an annual flu shot is the best way to prevent getting the flu.  Stay home from work or school as told by your health care provider. Unless you are visiting your health care provider, avoid leaving home until your fever has been gone for 24 hours without taking medicine.  Keep all follow-up visits. This is important.  This information is not intended to replace advice given to you by your health care provider. Make sure you discuss any questions you have with your health care provider.  Document Revised: 08/06/2021 Document Reviewed: 08/06/2021  Elsevier Patient Education © 2023 [employer]    "

## 2024-12-21 ENCOUNTER — TELEMEDICINE (OUTPATIENT)
Dept: FAMILY MEDICINE CLINIC | Facility: TELEHEALTH | Age: 44
End: 2024-12-21
Payer: MEDICARE

## 2024-12-21 DIAGNOSIS — J40 BRONCHITIS: Primary | ICD-10-CM

## 2024-12-21 RX ORDER — PREDNISONE 10 MG/1
TABLET ORAL
Qty: 30 TABLET | Refills: 0 | Status: SHIPPED | OUTPATIENT
Start: 2024-12-21

## 2024-12-21 RX ORDER — ALBUTEROL SULFATE 90 UG/1
2 INHALANT RESPIRATORY (INHALATION) EVERY 4 HOURS PRN
Qty: 18 G | Refills: 0 | Status: SHIPPED | OUTPATIENT
Start: 2024-12-21

## 2024-12-21 RX ORDER — AZITHROMYCIN 250 MG/1
TABLET, FILM COATED ORAL
Qty: 6 TABLET | Refills: 0 | Status: SHIPPED | OUTPATIENT
Start: 2024-12-21

## 2024-12-22 NOTE — PROGRESS NOTES
You have chosen to receive care through a telehealth visit.  Do you consent to use a video/audio connection for your medical care today? Yes     CHIEF COMPLAINT  Chief Complaint   Patient presents with    URI         HPI  Farhat Hein is a 44 y.o. male  presents with complaint of nasal congestion, chest congestion that started a couple of days ago    Review of Systems   HENT:  Positive for congestion and rhinorrhea.    Respiratory:  Positive for cough.    All other systems reviewed and are negative.      Past Medical History:   Diagnosis Date    Allergic 1979    seasonal    Arthritis 2020    legs    Asthma 2024    Chronic obstructive pulmonary disease 2023    COPD (chronic obstructive pulmonary disease) 2011    GERD (gastroesophageal reflux disease) 2006    Hypertension 2006    Kidney stone 2010    Low back pain 2020    Obesity 1997    Pneumonia 2023    Renal insufficiency 2021    Stroke 2015    Substance abuse     Type 2 diabetes mellitus 2019    Urinary tract infection 03/15/2020       Family History   Problem Relation Age of Onset    Arthritis Mother     COPD Mother     Diabetes Mother     Hyperlipidemia Mother     Hypertension Mother     Alcohol abuse Father     Arthritis Father     Cancer Father         SKIN    Diabetes Father     Heart disease Father     Hyperlipidemia Father     Hypertension Father     Obesity Father     Arthritis Sister     Cancer Sister         BREAST    Diabetes Sister     Hyperlipidemia Sister     Heart disease Sister     Hypertension Sister     Obesity Sister     Sleep apnea Sister     Arthritis Brother     Cancer Brother         KIDNEY/BRAIN/SKIN    Hyperlipidemia Brother     Kidney disease Brother     Hypertension Brother     Alcohol abuse Brother     Mental illness Brother         schizophrenia    Cancer Sister         COLON    Diabetes Sister             Hyperlipidemia Sister      Hypertension Sister     Diabetes Brother     Heart disease Brother     Hyperlipidemia Brother     Hypertension Brother     Alcohol abuse Brother     Cancer Maternal Grandmother     Cancer Maternal Uncle        Social History     Socioeconomic History    Marital status:    Tobacco Use    Smoking status: Former     Current packs/day: 0.00     Average packs/day: 2.0 packs/day for 10.4 years (20.8 ttl pk-yrs)     Types: Cigarettes     Start date: 1998     Quit date: 2008     Years since quittin.5     Passive exposure: Past    Smokeless tobacco: Never   Vaping Use    Vaping status: Never Used   Substance and Sexual Activity    Alcohol use: Not Currently     Alcohol/week: 12.0 standard drinks of alcohol     Types: 12 Cans of beer per week     Comment: PER DAY    Drug use: Yes     Frequency: 7.0 times per week     Types: Marijuana    Sexual activity: Yes     Partners: Female     Birth control/protection: Condom       Farhat Hein  reports that he quit smoking about 16 years ago. His smoking use included cigarettes. He started smoking about 26 years ago. He has a 20.8 pack-year smoking history. He has been exposed to tobacco smoke. He has never used smokeless tobacco.           There were no vitals taken for this visit.    PHYSICAL EXAM  Physical Exam   Constitutional: He appears well-developed and well-nourished.   HENT:   Head: Normocephalic.   Eyes: Pupils are equal, round, and reactive to light.   Pulmonary/Chest: Effort normal.   Musculoskeletal: Normal range of motion.   Neurological: He is alert.   Psychiatric: He has a normal mood and affect.       Results for orders placed or performed during the hospital encounter of 24   POC Lactate    Collection Time: 24  5:35 PM    Specimen: Blood   Result Value Ref Range    Lactate 1.0 0.3 - 2.0 mmol/L   Comprehensive Metabolic Panel    Collection Time: 24  6:00 PM    Specimen: Blood   Result Value Ref Range    Glucose 104 (H) 65 - 99  mg/dL    BUN 18 6 - 20 mg/dL    Creatinine 0.92 0.76 - 1.27 mg/dL    Sodium 136 136 - 145 mmol/L    Potassium 3.9 3.5 - 5.2 mmol/L    Chloride 99 98 - 107 mmol/L    CO2 27.9 22.0 - 29.0 mmol/L    Calcium 9.2 8.6 - 10.5 mg/dL    Total Protein 8.5 6.0 - 8.5 g/dL    Albumin 4.2 3.5 - 5.2 g/dL    ALT (SGPT) 22 1 - 41 U/L    AST (SGOT) 27 1 - 40 U/L    Alkaline Phosphatase 76 39 - 117 U/L    Total Bilirubin 0.8 0.0 - 1.2 mg/dL    Globulin 4.3 gm/dL    A/G Ratio 1.0 g/dL    BUN/Creatinine Ratio 19.6 7.0 - 25.0    Anion Gap 9.1 5.0 - 15.0 mmol/L    eGFR 104.5 >60.0 mL/min/1.73   CBC Auto Differential    Collection Time: 09/20/24  6:00 PM    Specimen: Blood   Result Value Ref Range    WBC 19.43 (H) 3.40 - 10.80 10*3/mm3    RBC 5.21 4.14 - 5.80 10*6/mm3    Hemoglobin 14.8 13.0 - 17.7 g/dL    Hematocrit 47.1 37.5 - 51.0 %    MCV 90.4 79.0 - 97.0 fL    MCH 28.4 26.6 - 33.0 pg    MCHC 31.4 (L) 31.5 - 35.7 g/dL    RDW 13.8 12.3 - 15.4 %    RDW-SD 45.9 37.0 - 54.0 fl    MPV 11.3 6.0 - 12.0 fL    Platelets 229 140 - 450 10*3/mm3    Neutrophil % 90.3 (H) 42.7 - 76.0 %    Lymphocyte % 4.7 (L) 19.6 - 45.3 %    Monocyte % 4.0 (L) 5.0 - 12.0 %    Eosinophil % 0.2 (L) 0.3 - 6.2 %    Basophil % 0.2 0.0 - 1.5 %    Immature Grans % 0.6 (H) 0.0 - 0.5 %    Neutrophils, Absolute 17.54 (H) 1.70 - 7.00 10*3/mm3    Lymphocytes, Absolute 0.92 0.70 - 3.10 10*3/mm3    Monocytes, Absolute 0.77 0.10 - 0.90 10*3/mm3    Eosinophils, Absolute 0.04 0.00 - 0.40 10*3/mm3    Basophils, Absolute 0.04 0.00 - 0.20 10*3/mm3    Immature Grans, Absolute 0.12 (H) 0.00 - 0.05 10*3/mm3    nRBC 0.0 0.0 - 0.2 /100 WBC   Duplex Venous Lower Extremity - Bilateral CAR    Collection Time: 09/20/24  6:40 PM   Result Value Ref Range    Right Common Femoral Spont Y     Right Common Femoral Competent Y     Right Common Femoral Phasic Y     Right Common Femoral Compress C     Right Common Femoral Augment Y     Right Saphenofemoral Junction Compress C     Right Proximal Femoral  Compress C     Right Mid Femoral Spont Y     Right Mid Femoral Competent Y     Right Mid Femoral Phasic Y     Right Mid Femoral Compress C     Right Mid Femoral Augment Y     Right Distal Femoral Compress C     Right Popliteal Spont Y     Right Popliteal Competent Y     Right Popliteal Phasic Y     Right Popliteal Compress C     Right Popliteal Augment Y     Right Posterior Tibial Compress C     Right Peroneal Compress C     Right Gastronemius Compress C     Right Greater Saph AK Compress C     Right Greater Saph BK Compress C     Right Lesser Saph Compress C     Left Common Femoral Spont Y     Left Common Femoral Competent Y     Left Common Femoral Phasic Y     Left Common Femoral Compress C     Left Common Femoral Augment Y     Left Saphenofemoral Junction Compress C     Left Proximal Femoral Compress C     Left Mid Femoral Spont Y     Left Mid Femoral Competent Y     Left Mid Femoral Phasic Y     Left Mid Femoral Compress C     Left Mid Femoral Augment Y     Left Distal Femoral Compress C     Left Popliteal Spont Y     Left Popliteal Competent Y     Left Popliteal Phasic Y     Left Popliteal Compress C     Left Popliteal Augment Y     Left Posterior Tibial Compress C     Left Peroneal Compress C     Left Gastronemius Compress C     Left Greater Saph AK Compress C     Left Greater Saph BK Compress C     Left Lesser Saph Compress C     BH CV VAS PRELIMINARY FINDINGS SCRIPTING 1.0    Blood Culture - Blood, Arm, Left    Collection Time: 09/20/24  6:48 PM    Specimen: Arm, Left; Blood   Result Value Ref Range    Blood Culture No growth at 5 days    Blood Culture - Blood, Arm, Left    Collection Time: 09/20/24  7:02 PM    Specimen: Arm, Left; Blood   Result Value Ref Range    Blood Culture No growth at 5 days    CBC Auto Differential    Collection Time: 09/21/24  4:31 AM    Specimen: Blood   Result Value Ref Range    WBC 13.92 (H) 3.40 - 10.80 10*3/mm3    RBC 5.09 4.14 - 5.80 10*6/mm3    Hemoglobin 14.2 13.0 - 17.7  g/dL    Hematocrit 46.9 37.5 - 51.0 %    MCV 92.1 79.0 - 97.0 fL    MCH 27.9 26.6 - 33.0 pg    MCHC 30.3 (L) 31.5 - 35.7 g/dL    RDW 14.2 12.3 - 15.4 %    RDW-SD 47.5 37.0 - 54.0 fl    MPV 10.9 6.0 - 12.0 fL    Platelets 194 140 - 450 10*3/mm3    Neutrophil % 83.7 (H) 42.7 - 76.0 %    Lymphocyte % 7.8 (L) 19.6 - 45.3 %    Monocyte % 7.3 5.0 - 12.0 %    Eosinophil % 0.4 0.3 - 6.2 %    Basophil % 0.4 0.0 - 1.5 %    Immature Grans % 0.4 0.0 - 0.5 %    Neutrophils, Absolute 11.66 (H) 1.70 - 7.00 10*3/mm3    Lymphocytes, Absolute 1.09 0.70 - 3.10 10*3/mm3    Monocytes, Absolute 1.01 (H) 0.10 - 0.90 10*3/mm3    Eosinophils, Absolute 0.05 0.00 - 0.40 10*3/mm3    Basophils, Absolute 0.05 0.00 - 0.20 10*3/mm3    Immature Grans, Absolute 0.06 (H) 0.00 - 0.05 10*3/mm3    nRBC 0.0 0.0 - 0.2 /100 WBC   Basic Metabolic Panel    Collection Time: 09/21/24  4:31 AM    Specimen: Blood   Result Value Ref Range    Glucose 93 65 - 99 mg/dL    BUN 22 (H) 6 - 20 mg/dL    Creatinine 1.00 0.76 - 1.27 mg/dL    Sodium 136 136 - 145 mmol/L    Potassium 3.8 3.5 - 5.2 mmol/L    Chloride 101 98 - 107 mmol/L    CO2 24.1 22.0 - 29.0 mmol/L    Calcium 8.7 8.6 - 10.5 mg/dL    BUN/Creatinine Ratio 22.0 7.0 - 25.0    Anion Gap 10.9 5.0 - 15.0 mmol/L    eGFR 94.6 >60.0 mL/min/1.73   Basic Metabolic Panel    Collection Time: 09/22/24  2:28 AM    Specimen: Blood   Result Value Ref Range    Glucose 113 (H) 65 - 99 mg/dL    BUN 24 (H) 6 - 20 mg/dL    Creatinine 1.11 0.76 - 1.27 mg/dL    Sodium 135 (L) 136 - 145 mmol/L    Potassium 3.2 (L) 3.5 - 5.2 mmol/L    Chloride 98 98 - 107 mmol/L    CO2 28.6 22.0 - 29.0 mmol/L    Calcium 8.7 8.6 - 10.5 mg/dL    BUN/Creatinine Ratio 21.6 7.0 - 25.0    Anion Gap 8.4 5.0 - 15.0 mmol/L    eGFR 83.5 >60.0 mL/min/1.73   CBC Auto Differential    Collection Time: 09/22/24  2:28 AM    Specimen: Blood   Result Value Ref Range    WBC 8.63 3.40 - 10.80 10*3/mm3    RBC 4.77 4.14 - 5.80 10*6/mm3    Hemoglobin 13.3 13.0 - 17.7 g/dL     Hematocrit 43.3 37.5 - 51.0 %    MCV 90.8 79.0 - 97.0 fL    MCH 27.9 26.6 - 33.0 pg    MCHC 30.7 (L) 31.5 - 35.7 g/dL    RDW 14.1 12.3 - 15.4 %    RDW-SD 46.5 37.0 - 54.0 fl    MPV 10.9 6.0 - 12.0 fL    Platelets 173 140 - 450 10*3/mm3    Neutrophil % 66.4 42.7 - 76.0 %    Lymphocyte % 22.0 19.6 - 45.3 %    Monocyte % 8.3 5.0 - 12.0 %    Eosinophil % 2.5 0.3 - 6.2 %    Basophil % 0.5 0.0 - 1.5 %    Immature Grans % 0.3 0.0 - 0.5 %    Neutrophils, Absolute 5.72 1.70 - 7.00 10*3/mm3    Lymphocytes, Absolute 1.90 0.70 - 3.10 10*3/mm3    Monocytes, Absolute 0.72 0.10 - 0.90 10*3/mm3    Eosinophils, Absolute 0.22 0.00 - 0.40 10*3/mm3    Basophils, Absolute 0.04 0.00 - 0.20 10*3/mm3    Immature Grans, Absolute 0.03 0.00 - 0.05 10*3/mm3    nRBC 0.0 0.0 - 0.2 /100 WBC   ECG 12 Lead Electrolyte Imbalance    Collection Time: 09/22/24  4:06 AM   Result Value Ref Range    QT Interval 393 ms    QTC Interval 448 ms   Wound Culture - Wound, Leg, Right    Collection Time: 09/22/24 12:57 PM    Specimen: Leg, Right; Wound   Result Value Ref Range    Wound Culture Rare growth Escherichia coli (A)     Wound Culture Scant growth (1+) Normal Skin Barbara     Gram Stain Moderate (3+) WBCs per low power field     Gram Stain No organisms seen        Susceptibility    Escherichia coli - QUINN*     Amoxicillin + Clavulanate  Susceptible ug/ml     Ampicillin  Susceptible ug/ml     Ampicillin + Sulbactam  Susceptible ug/ml     Cefepime  Susceptible ug/ml     Ceftazidime  Susceptible ug/ml     Ceftriaxone  Susceptible ug/ml     Gentamicin  Susceptible ug/ml     Levofloxacin  Susceptible ug/ml     Piperacillin + Tazobactam  Susceptible ug/ml     Tetracycline  Susceptible ug/ml     Trimethoprim + Sulfamethoxazole  Susceptible ug/ml     * Cefazolin sensitivity will not be reported for Enterobacteriaceae in non-urine isolates. If cefazolin is preferred, please call the microbiology lab to request an E-test.  With the exception of urinary-sourced  infections, aminoglycosides should not be used as monotherapy.   Potassium    Collection Time: 09/22/24 12:58 PM    Specimen: Arm, Right; Blood   Result Value Ref Range    Potassium 3.7 3.5 - 5.2 mmol/L       Diagnoses and all orders for this visit:    1. Bronchitis (Primary)  -     azithromycin (Zithromax Z-Vincent) 250 MG tablet; Take 2 tablets by mouth on day 1, then 1 tablet daily on days 2-5  Dispense: 6 tablet; Refill: 0  -     predniSONE (DELTASONE) 10 MG tablet; 5 po for 2 days, 4 po for 2 days, 3 po for 2 days, 2 po for 2 days, 1 po for 2 days  Dispense: 30 tablet; Refill: 0  -     albuterol sulfate  (90 Base) MCG/ACT inhaler; Inhale 2 puffs Every 4 (Four) Hours As Needed for Wheezing.  Dispense: 18 g; Refill: 0          FOLLOW-UP  As discussed during visit with PCP/[facility] if no improvement or Urgent Care/Emergency Department if worsening of symptoms    Patient verbalizes understanding of medication dosage, comfort measures, instructions for treatment and follow-up.    SAMI Evans  12/21/2024  22:30 EST    Mode of Visit: Video  Location of patient: -HOME-  Location of provider: +HOME+  You have chosen to receive care through a telehealth visit.  The patient has signed the video visit consent form.  The visit included audio and video interaction. No technical issues occurred during this visit.      Note Disclaimer: At [facility], we believe that sharing information builds trust and better   relationships. You are receiving this note because you recently visited [facility]. It is possible you   will see health information before a provider has talked with you about it. This kind of information can   be easy to misunderstand. To help you fully understand what it means for your health, we urge you to   discuss this note with your provider.

## 2024-12-26 RX ORDER — APIXABAN 5 MG/1
5 TABLET, FILM COATED ORAL 2 TIMES DAILY
Qty: 180 TABLET | Refills: 0 | Status: SHIPPED | OUTPATIENT
Start: 2024-12-26

## 2025-01-27 RX ORDER — TORSEMIDE 100 MG/1
100 TABLET ORAL DAILY
Qty: 90 TABLET | Refills: 0 | OUTPATIENT
Start: 2025-01-27

## 2025-02-18 ENCOUNTER — HOSPITAL ENCOUNTER (OUTPATIENT)
Facility: HOSPITAL | Age: 45
Discharge: HOME | End: 2025-02-18
Attending: EMERGENCY MEDICINE | Admitting: EMERGENCY MEDICINE
Payer: MEDICARE

## 2025-02-18 VITALS
BODY MASS INDEX: 44.1 KG/M2 | TEMPERATURE: 97.8 F | WEIGHT: 315 LBS | HEIGHT: 71 IN | OXYGEN SATURATION: 97 % | RESPIRATION RATE: 22 BRPM | HEART RATE: 81 BPM | SYSTOLIC BLOOD PRESSURE: 194 MMHG | DIASTOLIC BLOOD PRESSURE: 104 MMHG

## 2025-02-18 DIAGNOSIS — L03.115 CELLULITIS OF RIGHT LEG: Primary | ICD-10-CM

## 2025-02-18 PROCEDURE — G0463 HOSPITAL OUTPT CLINIC VISIT: HCPCS | Performed by: EMERGENCY MEDICINE

## 2025-02-18 RX ORDER — CEPHALEXIN 500 MG/1
500 CAPSULE ORAL 4 TIMES DAILY
Qty: 28 CAPSULE | Refills: 0 | Status: SHIPPED | OUTPATIENT
Start: 2025-02-18

## 2025-02-18 RX ORDER — SULFAMETHOXAZOLE AND TRIMETHOPRIM 800; 160 MG/1; MG/1
1 TABLET ORAL 2 TIMES DAILY
Qty: 20 TABLET | Refills: 0 | Status: SHIPPED | OUTPATIENT
Start: 2025-02-18

## 2025-02-18 RX ORDER — DIAPER,BRIEF,INFANT-TODD,DISP
1 EACH MISCELLANEOUS ONCE
Status: DISCONTINUED | OUTPATIENT
Start: 2025-02-18 | End: 2025-02-18 | Stop reason: HOSPADM

## 2025-02-18 NOTE — FSED PROVIDER NOTE
Subjective   History of Present Illness  This is a 45-year-old male with a history of type 2 diabetes mellitus, hypertension, COPD who presents for evaluation.  He states that he has had a wound to his right leg for the last 1 week.  He states that is now draining a foul smelling discharge.  No fever, chills, nausea, or vomiting.  He has had a history of cellulitis in the past.  He has seen wound care in the past, but it has been sometime.        Review of Systems   Constitutional:  Negative for fever.   Gastrointestinal:  Negative for vomiting.   Skin:  Positive for wound.       Past Medical History:   Diagnosis Date    Allergic 1979    seasonal    Arthritis 01/05/2020    legs    Asthma 01/2024    Chronic obstructive pulmonary disease 07/17/2023    COPD (chronic obstructive pulmonary disease) 04/01/2011    GERD (gastroesophageal reflux disease) 06/06/2006    Hypertension 06/06/2006    Kidney stone 08/08/2010    Low back pain 01/01/2020    Obesity 01/21/1997    Pneumonia 11/2023    Renal insufficiency 03/2021    Stroke 01/21/2015 01/21/2016    Substance abuse     Type 2 diabetes mellitus 07/27/2019    Urinary tract infection 03/15/2020       Allergies   Allergen Reactions    Promethazine Itching    Vancomycin Anaphylaxis    Calamine-Zinc Oxide Itching and Rash       Past Surgical History:   Procedure Laterality Date    APPENDECTOMY  03/2017    CHOLECYSTECTOMY  02/2016       Family History   Problem Relation Age of Onset    Arthritis Mother     COPD Mother     Diabetes Mother     Hyperlipidemia Mother     Hypertension Mother     Alcohol abuse Father     Arthritis Father     Cancer Father         SKIN    Diabetes Father     Heart disease Father     Hyperlipidemia Father     Hypertension Father     Obesity Father     Arthritis Sister     Cancer Sister         BREAST    Diabetes Sister     Hyperlipidemia Sister     Heart disease Sister     Hypertension Sister     Obesity Sister     Sleep apnea Sister      Arthritis Brother     Cancer Brother         KIDNEY/BRAIN/SKIN    Hyperlipidemia Brother     Kidney disease Brother     Hypertension Brother     Alcohol abuse Brother     Mental illness Brother         schizophrenia    Cancer Sister         COLON    Diabetes Sister             Hyperlipidemia Sister     Hypertension Sister     Diabetes Brother     Heart disease Brother     Hyperlipidemia Brother     Hypertension Brother     Alcohol abuse Brother     Cancer Maternal Grandmother     Cancer Maternal Uncle        Social History     Socioeconomic History    Marital status:    Tobacco Use    Smoking status: Former     Current packs/day: 0.00     Average packs/day: 2.0 packs/day for 10.4 years (20.8 ttl pk-yrs)     Types: Cigarettes     Start date: 1998     Quit date: 2008     Years since quittin.6     Passive exposure: Past    Smokeless tobacco: Never   Vaping Use    Vaping status: Never Used   Substance and Sexual Activity    Alcohol use: Not Currently     Alcohol/week: 12.0 standard drinks of alcohol     Types: 12 Cans of beer per week     Comment: PER DAY    Drug use: Yes     Frequency: 7.0 times per week     Types: Marijuana    Sexual activity: Yes     Partners: Female     Birth control/protection: Condom           Objective   Physical Exam  Vitals and nursing note reviewed.   Constitutional:       General: He is not in acute distress.  Cardiovascular:      Pulses:           Dorsalis pedis pulses are 1+ on the right side.   Skin:     Comments: There is a stasis dermatitis of both lower extremities.  There appears to be a scab over the anterior aspect of the right shin which has a yellowish drainage from it.  There is surrounding erythema.  No evidence of fluctuance to suggest abscess.   Neurological:      General: No focal deficit present.      Mental Status: He is alert.         Procedures           ED Course  ED Course as of 25 1302   Tue 2025   1302 The patient was seen  and examined.  History is obtained from the patient as well as review of previous medical records.  Patient has been admitted to the hospital for cellulitis in the past, however at that time it appears that he was septic.  He has no signs of sepsis at this time.  Patient's wound was cleansed and bandaged. [BW]      ED Course User Index  [BW] Hernesto Johnston MD                                           Medical Decision Making  Problems Addressed:  Cellulitis of right leg: acute illness or injury    45-year-old male with cellulitis right leg.  At this point, I think it is reasonable to attempt oral antibiotics.  Patient will be discharged with prescriptions for Keflex and Bactrim DS.  Wound care instructions have been given.  He has to follow-up with primary care, and return if worsened symptoms.    Final diagnoses:   Cellulitis of right leg       ED Disposition  ED Disposition       ED Disposition   Discharge    Condition   Stable    Comment   --               PATIENT CONNECTION - [place] [id number]  [phone]  Call            Medication List        New Prescriptions      cephalexin 500 MG capsule  Commonly known as: KEFLEX  Take 1 capsule by mouth 4 (Four) Times a Day.     sulfamethoxazole-trimethoprim 800-160 MG per tablet  Commonly known as: BACTRIM DS,SEPTRA DS  Take 1 tablet by mouth 2 (Two) Times a Day.               Where to Get Your Medications        These medications were sent to [facility] [id number] - [place], [place] - [address] [place] [address] [address]  - [phone] [facility] [phone] [address] [place] [address]      Phone: [phone]   cephalexin 500 MG capsule  sulfamethoxazole-trimethoprim 800-160 MG per tablet

## 2025-02-18 NOTE — DISCHARGE INSTRUCTIONS
Bactrim DS-1 tablet twice daily  Keflex-1 tablet 4 times daily  Wash the leg with soapy water twice daily  Minimal weightbearing for the next few days  Follow-up with primary care  Return if increased redness, swelling, fever, or worsened symptoms

## 2025-03-09 ENCOUNTER — HOSPITAL ENCOUNTER (INPATIENT)
Facility: HOSPITAL | Age: 45
LOS: 4 days | Discharge: HOME | End: 2025-03-13
Attending: EMERGENCY MEDICINE | Admitting: STUDENT IN AN ORGANIZED HEALTH CARE EDUCATION/TRAINING PROGRAM
Payer: MEDICARE

## 2025-03-09 ENCOUNTER — APPOINTMENT (OUTPATIENT)
Dept: CARDIOLOGY | Facility: HOSPITAL | Age: 45
End: 2025-03-09
Payer: MEDICARE

## 2025-03-09 DIAGNOSIS — L03.115 CELLULITIS OF RIGHT LEG: Primary | ICD-10-CM

## 2025-03-09 DIAGNOSIS — L03.119 CELLULITIS OF LOWER EXTREMITY, UNSPECIFIED LATERALITY: ICD-10-CM

## 2025-03-09 DIAGNOSIS — L03.115 CELLULITIS OF RIGHT LOWER EXTREMITY: ICD-10-CM

## 2025-03-09 PROBLEM — L03.90 CELLULITIS: Status: ACTIVE | Noted: 2025-03-09

## 2025-03-09 LAB
ANION GAP SERPL CALCULATED.3IONS-SCNC: 9.1 MMOL/L (ref 5–15)
BASOPHILS # BLD AUTO: 0.06 10*3/MM3 (ref 0–0.2)
BASOPHILS NFR BLD AUTO: 0.7 % (ref 0–1.5)
BH CV LOW VAS RIGHT POPLITEAL SPONT: 1
BH CV LOWER VASCULAR LEFT COMMON FEMORAL AUGMENT: NORMAL
BH CV LOWER VASCULAR LEFT COMMON FEMORAL COMPETENT: NORMAL
BH CV LOWER VASCULAR LEFT COMMON FEMORAL COMPRESS: NORMAL
BH CV LOWER VASCULAR LEFT COMMON FEMORAL PHASIC: NORMAL
BH CV LOWER VASCULAR LEFT COMMON FEMORAL SPONT: NORMAL
BH CV LOWER VASCULAR RIGHT COMMON FEMORAL AUGMENT: NORMAL
BH CV LOWER VASCULAR RIGHT COMMON FEMORAL COMPETENT: NORMAL
BH CV LOWER VASCULAR RIGHT COMMON FEMORAL COMPRESS: NORMAL
BH CV LOWER VASCULAR RIGHT COMMON FEMORAL PHASIC: NORMAL
BH CV LOWER VASCULAR RIGHT COMMON FEMORAL SPONT: NORMAL
BH CV LOWER VASCULAR RIGHT DISTAL FEMORAL SPONT: NORMAL
BH CV LOWER VASCULAR RIGHT GASTRONEMIUS COMPRESS: NORMAL
BH CV LOWER VASCULAR RIGHT GREATER SAPH AK COMPRESS: NORMAL
BH CV LOWER VASCULAR RIGHT GREATER SAPH BK COMPRESS: NORMAL
BH CV LOWER VASCULAR RIGHT LESSER SAPH COMPRESS: NORMAL
BH CV LOWER VASCULAR RIGHT MID FEMORAL AUGMENT: NORMAL
BH CV LOWER VASCULAR RIGHT MID FEMORAL COMPETENT: NORMAL
BH CV LOWER VASCULAR RIGHT MID FEMORAL COMPRESS: NORMAL
BH CV LOWER VASCULAR RIGHT MID FEMORAL PHASIC: NORMAL
BH CV LOWER VASCULAR RIGHT MID FEMORAL SPONT: NORMAL
BH CV LOWER VASCULAR RIGHT PERONEAL COMPRESS: NORMAL
BH CV LOWER VASCULAR RIGHT POPLITEAL AUGMENT: NORMAL
BH CV LOWER VASCULAR RIGHT POPLITEAL COMPETENT: NORMAL
BH CV LOWER VASCULAR RIGHT POPLITEAL COMPRESS: NORMAL
BH CV LOWER VASCULAR RIGHT POPLITEAL PHASIC: NORMAL
BH CV LOWER VASCULAR RIGHT POPLITEAL SPONT: NORMAL
BH CV LOWER VASCULAR RIGHT POPLITEAL THROMBUS: NORMAL
BH CV LOWER VASCULAR RIGHT POSTERIOR TIBIAL COMPRESS: NORMAL
BH CV LOWER VASCULAR RIGHT PROXIMAL FEMORAL COMPRESS: NORMAL
BH CV LOWER VASCULAR RIGHT SAPHENOFEMORAL JUNCTION COMPRESS: NORMAL
BH CV LOWER VASCULAR RIGHT VARICOSITY BK COMPRESS: NORMAL
BUN SERPL-MCNC: 9 MG/DL (ref 6–20)
BUN/CREAT SERPL: 13.8 (ref 7–25)
CALCIUM SPEC-SCNC: 8.3 MG/DL (ref 8.6–10.5)
CHLORIDE SERPL-SCNC: 103 MMOL/L (ref 98–107)
CO2 SERPL-SCNC: 24.9 MMOL/L (ref 22–29)
CREAT SERPL-MCNC: 0.65 MG/DL (ref 0.76–1.27)
DEPRECATED RDW RBC AUTO: 47.8 FL (ref 37–54)
EGFRCR SERPLBLD CKD-EPI 2021: 117.7 ML/MIN/1.73
EOSINOPHIL # BLD AUTO: 0.28 10*3/MM3 (ref 0–0.4)
EOSINOPHIL NFR BLD AUTO: 3.3 % (ref 0.3–6.2)
ERYTHROCYTE [DISTWIDTH] IN BLOOD BY AUTOMATED COUNT: 14.2 % (ref 12.3–15.4)
GLUCOSE SERPL-MCNC: 102 MG/DL (ref 65–99)
HCT VFR BLD AUTO: 44 % (ref 37.5–51)
HGB BLD-MCNC: 13.5 G/DL (ref 13–17.7)
IMM GRANULOCYTES # BLD AUTO: 0.01 10*3/MM3 (ref 0–0.05)
IMM GRANULOCYTES NFR BLD AUTO: 0.1 % (ref 0–0.5)
LYMPHOCYTES # BLD AUTO: 2.3 10*3/MM3 (ref 0.7–3.1)
LYMPHOCYTES NFR BLD AUTO: 27.1 % (ref 19.6–45.3)
MCH RBC QN AUTO: 28.4 PG (ref 26.6–33)
MCHC RBC AUTO-ENTMCNC: 30.7 G/DL (ref 31.5–35.7)
MCV RBC AUTO: 92.4 FL (ref 79–97)
MONOCYTES # BLD AUTO: 0.66 10*3/MM3 (ref 0.1–0.9)
MONOCYTES NFR BLD AUTO: 7.8 % (ref 5–12)
NEUTROPHILS NFR BLD AUTO: 5.18 10*3/MM3 (ref 1.7–7)
NEUTROPHILS NFR BLD AUTO: 61 % (ref 42.7–76)
NRBC BLD AUTO-RTO: 0 /100 WBC (ref 0–0.2)
PLATELET # BLD AUTO: 181 10*3/MM3 (ref 140–450)
PMV BLD AUTO: 10.5 FL (ref 6–12)
POTASSIUM SERPL-SCNC: 4.5 MMOL/L (ref 3.5–5.2)
RBC # BLD AUTO: 4.76 10*6/MM3 (ref 4.14–5.8)
SODIUM SERPL-SCNC: 137 MMOL/L (ref 136–145)
WBC NRBC COR # BLD AUTO: 8.49 10*3/MM3 (ref 3.4–10.8)

## 2025-03-09 PROCEDURE — 87205 SMEAR GRAM STAIN: CPT | Performed by: EMERGENCY MEDICINE

## 2025-03-09 PROCEDURE — 94640 AIRWAY INHALATION TREATMENT: CPT

## 2025-03-09 PROCEDURE — 85025 COMPLETE CBC W/AUTO DIFF WBC: CPT | Performed by: EMERGENCY MEDICINE

## 2025-03-09 PROCEDURE — 36415 COLL VENOUS BLD VENIPUNCTURE: CPT

## 2025-03-09 PROCEDURE — 93971 EXTREMITY STUDY: CPT

## 2025-03-09 PROCEDURE — 25010000002 CEFTRIAXONE PER 250 MG: Performed by: EMERGENCY MEDICINE

## 2025-03-09 PROCEDURE — 94799 UNLISTED PULMONARY SVC/PX: CPT

## 2025-03-09 PROCEDURE — 93971 EXTREMITY STUDY: CPT | Performed by: SURGERY

## 2025-03-09 PROCEDURE — 99285 EMERGENCY DEPT VISIT HI MDM: CPT

## 2025-03-09 PROCEDURE — 87186 SC STD MICRODIL/AGAR DIL: CPT | Performed by: EMERGENCY MEDICINE

## 2025-03-09 PROCEDURE — 87147 CULTURE TYPE IMMUNOLOGIC: CPT | Performed by: EMERGENCY MEDICINE

## 2025-03-09 PROCEDURE — 87077 CULTURE AEROBIC IDENTIFY: CPT | Performed by: EMERGENCY MEDICINE

## 2025-03-09 PROCEDURE — 87070 CULTURE OTHR SPECIMN AEROBIC: CPT | Performed by: EMERGENCY MEDICINE

## 2025-03-09 PROCEDURE — 80048 BASIC METABOLIC PNL TOTAL CA: CPT | Performed by: EMERGENCY MEDICINE

## 2025-03-09 RX ORDER — SODIUM CHLORIDE 0.9 % (FLUSH) 0.9 %
10 SYRINGE (ML) INJECTION AS NEEDED
Status: DISCONTINUED | OUTPATIENT
Start: 2025-03-09 | End: 2025-03-13 | Stop reason: HOSPADM

## 2025-03-09 RX ORDER — SODIUM CHLORIDE 0.9 % (FLUSH) 0.9 %
10 SYRINGE (ML) INJECTION AS NEEDED
Status: DISCONTINUED | OUTPATIENT
Start: 2025-03-09 | End: 2025-03-10

## 2025-03-09 RX ORDER — DIPHENOXYLATE HYDROCHLORIDE AND ATROPINE SULFATE 2.5; .025 MG/1; MG/1
1 TABLET ORAL DAILY
Status: DISCONTINUED | OUTPATIENT
Start: 2025-03-09 | End: 2025-03-13 | Stop reason: HOSPADM

## 2025-03-09 RX ORDER — ACETAMINOPHEN 500 MG
1000 TABLET ORAL ONCE
Status: DISCONTINUED | OUTPATIENT
Start: 2025-03-09 | End: 2025-03-13 | Stop reason: HOSPADM

## 2025-03-09 RX ORDER — ROPINIROLE 0.25 MG/1
0.25 TABLET, FILM COATED ORAL NIGHTLY
COMMUNITY

## 2025-03-09 RX ORDER — PANTOPRAZOLE SODIUM 40 MG/1
40 TABLET, DELAYED RELEASE ORAL 2 TIMES DAILY
Status: DISCONTINUED | OUTPATIENT
Start: 2025-03-09 | End: 2025-03-13 | Stop reason: HOSPADM

## 2025-03-09 RX ORDER — HYDROCODONE BITARTRATE AND ACETAMINOPHEN 10; 325 MG/1; MG/1
1 TABLET ORAL EVERY 6 HOURS PRN
Status: DISCONTINUED | OUTPATIENT
Start: 2025-03-09 | End: 2025-03-10

## 2025-03-09 RX ORDER — OXYCODONE HYDROCHLORIDE 5 MG/1
10 TABLET ORAL EVERY 6 HOURS PRN
Refills: 0 | Status: DISCONTINUED | OUTPATIENT
Start: 2025-03-09 | End: 2025-03-09

## 2025-03-09 RX ORDER — HYDRALAZINE HYDROCHLORIDE 25 MG/1
25 TABLET, FILM COATED ORAL 3 TIMES DAILY
Status: DISCONTINUED | OUTPATIENT
Start: 2025-03-09 | End: 2025-03-11

## 2025-03-09 RX ORDER — OXYCODONE HYDROCHLORIDE 5 MG/1
10 TABLET ORAL ONCE
Refills: 0 | Status: COMPLETED | OUTPATIENT
Start: 2025-03-09 | End: 2025-03-09

## 2025-03-09 RX ORDER — CARVEDILOL 25 MG/1
25 TABLET ORAL 2 TIMES DAILY WITH MEALS
Status: DISCONTINUED | OUTPATIENT
Start: 2025-03-09 | End: 2025-03-13 | Stop reason: HOSPADM

## 2025-03-09 RX ORDER — HYDROCHLOROTHIAZIDE 25 MG/1
25 TABLET ORAL DAILY
Status: DISCONTINUED | OUTPATIENT
Start: 2025-03-09 | End: 2025-03-13 | Stop reason: HOSPADM

## 2025-03-09 RX ORDER — SODIUM CHLORIDE 0.9 % (FLUSH) 0.9 %
10 SYRINGE (ML) INJECTION EVERY 12 HOURS SCHEDULED
Status: DISCONTINUED | OUTPATIENT
Start: 2025-03-09 | End: 2025-03-13 | Stop reason: HOSPADM

## 2025-03-09 RX ORDER — SODIUM CHLORIDE 9 MG/ML
40 INJECTION, SOLUTION INTRAVENOUS AS NEEDED
Status: DISCONTINUED | OUTPATIENT
Start: 2025-03-09 | End: 2025-03-13 | Stop reason: HOSPADM

## 2025-03-09 RX ORDER — BUDESONIDE AND FORMOTEROL FUMARATE DIHYDRATE 160; 4.5 UG/1; UG/1
2 AEROSOL RESPIRATORY (INHALATION)
Status: DISCONTINUED | OUTPATIENT
Start: 2025-03-09 | End: 2025-03-13 | Stop reason: HOSPADM

## 2025-03-09 RX ORDER — ROPINIROLE 0.25 MG/1
0.25 TABLET, FILM COATED ORAL DAILY
Status: DISCONTINUED | OUTPATIENT
Start: 2025-03-09 | End: 2025-03-13 | Stop reason: HOSPADM

## 2025-03-09 RX ORDER — ALBUTEROL SULFATE 90 UG/1
2 INHALANT RESPIRATORY (INHALATION) EVERY 4 HOURS PRN
Status: DISCONTINUED | OUTPATIENT
Start: 2025-03-09 | End: 2025-03-13 | Stop reason: HOSPADM

## 2025-03-09 RX ADMIN — CARVEDILOL 25 MG: 25 TABLET, FILM COATED ORAL at 19:46

## 2025-03-09 RX ADMIN — THERA TABS 1 TABLET: TAB at 21:44

## 2025-03-09 RX ADMIN — DOXYCYCLINE 100 MG: 100 INJECTION, POWDER, LYOPHILIZED, FOR SOLUTION INTRAVENOUS at 14:47

## 2025-03-09 RX ADMIN — CEFTRIAXONE 2000 MG: 2 INJECTION, POWDER, FOR SOLUTION INTRAMUSCULAR; INTRAVENOUS at 11:40

## 2025-03-09 RX ADMIN — HYDROCODONE BITARTRATE AND ACETAMINOPHEN 1 TABLET: 10; 325 TABLET ORAL at 19:46

## 2025-03-09 RX ADMIN — Medication 10 ML: at 14:47

## 2025-03-09 RX ADMIN — OXYCODONE 10 MG: 5 TABLET ORAL at 14:46

## 2025-03-09 RX ADMIN — ROPINIROLE HYDROCHLORIDE 0.25 MG: 0.25 TABLET, FILM COATED ORAL at 21:44

## 2025-03-09 RX ADMIN — BUDESONIDE AND FORMOTEROL FUMARATE DIHYDRATE 2 PUFF: 160; 4.5 AEROSOL RESPIRATORY (INHALATION) at 20:05

## 2025-03-09 RX ADMIN — PANTOPRAZOLE SODIUM 40 MG: 40 TABLET, DELAYED RELEASE ORAL at 21:44

## 2025-03-09 RX ADMIN — APIXABAN 5 MG: 5 TABLET, FILM COATED ORAL at 21:44

## 2025-03-09 RX ADMIN — Medication 10 ML: at 21:44

## 2025-03-09 RX ADMIN — HYDRALAZINE HYDROCHLORIDE 25 MG: 25 TABLET ORAL at 21:44

## 2025-03-09 NOTE — Clinical Note
Level of Care: Med/Surg [1]   Admitting Physician: SHAHRZAD DOMINGUEZ [[id number]]   Attending Physician: SHAHRZAD DOMINGUEZ [[id number]]

## 2025-03-09 NOTE — H&P
[facility] Medicine Services  History & Physical    Patient Name: Farhat Hein  : 1980  MRN: [id number]  Primary Care Physician:  Provider, No Known  Date of admission: 3/9/2025  Date and Time of Service: 3/9/2025     Subjective      Chief Complaint: Pain, discoloration and drainage from the right lower extremity    History of Present Illness: Farhat Hein is a 45 y.o. male with a CMH of morbid obesity, restless leg syndrome, DVT, factor V Leyden, hypertension, who presented to [facility] on 3/9/2025 with complaint of swelling, pain and drainage from the right lower extremity for more than 2 weeks.  He was diagnosed with cellulitis of right lower extremity 2 weeks ago and was prescribed Keflex and Bactrim which she has taken for 2 weeks with no improvement in the symptoms.  Today he complains of pain in the right lower extremity along with ulcerations and drainage.  He denies any fever, vomiting.  There is no chest pain, abdominal pain, or any new urinary or bowel symptoms    Past medical history:   Severe obesity    Social history:  Denies cigarette smoking, substance abuse or alcohol abuse      Review of Systems   Constitutional: Negative.    HENT: Negative.     Cardiovascular: Negative.    Gastrointestinal: Negative.    Endocrine: Negative.    Genitourinary: Negative.    Musculoskeletal: Negative.    Skin:  Positive for color change and wound (RLE ulcers).   Allergic/Immunologic: Negative.    Neurological: Negative.    Hematological: Negative.    Psychiatric/Behavioral: Negative.         Personal History     Past Medical History:   Diagnosis Date    Allergic 1979    seasonal    Arthritis 2020    legs    Asthma 2024    Chronic obstructive pulmonary disease 2023    COPD (chronic obstructive pulmonary disease) 2011    GERD (gastroesophageal reflux disease) 2006    Hypertension 2006    Kidney stone 2010    Low back pain 2020    Obesity  01/21/1997    Pneumonia 11/2023    Renal insufficiency 03/2021    Stroke 01/21/2015 01/21/2016    Substance abuse     Type 2 diabetes mellitus 07/27/2019    Urinary tract infection 03/15/2020       Past Surgical History:   Procedure Laterality Date    APPENDECTOMY  03/2017    CHOLECYSTECTOMY  02/2016       Family History: family history includes Alcohol abuse in his brother, brother, and father; Arthritis in his brother, father, mother, and sister; COPD in his mother; Cancer in his brother, father, maternal grandmother, maternal uncle, sister, and sister; Diabetes in his brother, father, mother, sister, and sister; Heart disease in his brother, father, and sister; Hyperlipidemia in his brother, brother, father, mother, sister, and sister; Hypertension in his brother, brother, father, mother, sister, and sister; Kidney disease in his brother; Mental illness in his brother; Obesity in his father and sister; Sleep apnea in his sister. Otherwise pertinent FHx was reviewed and not pertinent to current issue.    Social History:  reports that he quit smoking about 16 years ago. His smoking use included cigarettes. He started smoking about 27 years ago. He has a 20.8 pack-year smoking history. He has been exposed to tobacco smoke. He has never used smokeless tobacco. He reports that he does not currently use alcohol after a past usage of about 12.0 standard drinks of alcohol per week. He reports current drug use. Frequency: 7.00 times per week. Drug: Marijuana.    Home Medications:  Prior to Admission Medications       Prescriptions Last Dose Informant Patient Reported? Taking?    albuterol sulfate  (90 Base) MCG/ACT inhaler   No No    Inhale 2 puffs Every 4 (Four) Hours As Needed for Wheezing.    apixaban (Eliquis) 5 MG tablet tablet   No No    TAKE 1 TABLET BY MOUTH 2 TIMES A DAY    brompheniramine-pseudoephedrine-DM 30-2-10 MG/5ML syrup   No No    Take 10 mL by mouth 4 (Four) Times a Day As Needed for  Congestion, Cough or Allergies.    budesonide-formoterol (Symbicort) 80-4.5 MCG/ACT inhaler   No No    Inhale 2 puffs 2 (Two) Times a Day.    carvedilol (COREG) 25 MG tablet   No No    Take 1 tablet by mouth 2 (Two) Times a Day With Meals.    cephalexin (KEFLEX) 500 MG capsule   No No    Take 1 capsule by mouth 4 (Four) Times a Day.    hydrALAZINE (APRESOLINE) 25 MG tablet   No No    TAKE 1 TABLET BY MOUTH 3 TIMES A DAY    hydroCHLOROthiazide 25 MG tablet   No No    TAKE 1 TABLET BY MOUTH DAILY    HYDROcodone-acetaminophen (NORCO)  MG per tablet   No No    Take 1 tablet by mouth Every 6 (Six) Hours As Needed for Moderate Pain.    loperamide (Imodium A-D) 2 MG tablet   No No    Take 1 tablet by mouth 4 (Four) Times a Day As Needed for Diarrhea.    multivitamin (THERAGRAN) tablet tablet   Yes No    Take 1 tablet by mouth Daily. mens    naloxone (NARCAN) 4 MG/0.1ML nasal spray   No No    Call 911. Don't prime. Myra in 1 nostril for overdose. Repeat in 2-3 minutes in other nostril if no or minimal breathing/responsiveness.    ondansetron ODT (ZOFRAN-ODT) 8 MG disintegrating tablet   No No    Place 1 tablet on the tongue Every 8 (Eight) Hours As Needed for Nausea or Vomiting.    pantoprazole (PROTONIX) 40 MG EC tablet   Yes No    Take 1 tablet by mouth 2 (Two) Times a Day.    potassium chloride (MICRO-K) 10 MEQ CR capsule   No No    TAKE 1 CAPSULE BY MOUTH DAILY    rOPINIRole (REQUIP) 0.25 MG tablet   No No    TAKE 1 TABLET BY MOUTH 1 HOUR BEFORE BED    sertraline (Zoloft) 25 MG tablet   No No    Take 1 tablet by mouth Daily.    Patient taking differently:  Take 1 tablet by mouth every night at bedtime.    silver sulfadiazine (SILVADENE, SSD) 1 % cream   No No    Apply 1 Application topically to the appropriate area as directed Daily.    sulfamethoxazole-trimethoprim (BACTRIM DS,SEPTRA DS) 800-160 MG per tablet   No No    Take 1 tablet by mouth 2 (Two) Times a Day.    Tirzepatide 10 MG/0.5ML solution auto-injector    No No    Inject 10 mg under the skin into the appropriate area as directed 1 (One) Time Per Week.    torsemide (DEMADEX) 100 MG tablet   No No    TAKE 1 TABLET BY MOUTH DAILY    vitamin D3 125 MCG (5000 UT) capsule capsule   No No    Take 1 capsule by mouth Daily.              Allergies:  Allergies   Allergen Reactions    Promethazine Itching    Vancomycin Anaphylaxis    Calamine-Zinc Oxide Itching and Rash       Objective      Vitals:   Temp:  [97.5 °F (36.4 °C)] 97.5 °F (36.4 °C)  Heart Rate:  [75-85] 85  Resp:  [20] 20  BP: (135-170)/() 153/113  Body mass index is 64.85 kg/m².  Physical Exam  Constitutional:       General: He is not in acute distress.     Appearance: He is obese. He is not ill-appearing.   HENT:      Head: Normocephalic and atraumatic.      Right Ear: External ear normal.      Left Ear: External ear normal.      Nose: Nose normal.      Mouth/Throat:      Mouth: Mucous membranes are moist.   Eyes:      Conjunctiva/sclera: Conjunctivae normal.   Cardiovascular:      Rate and Rhythm: Normal rate and regular rhythm.   Pulmonary:      Effort: Pulmonary effort is normal.   Abdominal:      Palpations: Abdomen is soft.      Tenderness: There is no abdominal tenderness.   Musculoskeletal:         General: Normal range of motion.      Cervical back: Normal range of motion and neck supple.   Skin:     Findings: Lesion present.   Neurological:      General: No focal deficit present.      Mental Status: He is oriented to person, place, and time. Mental status is at baseline.   Psychiatric:         Mood and Affect: Mood normal.         Behavior: Behavior normal.       Brownish discoloration of both lower extremities below the knee consistent with chronic venous insufficiency  Multiple ulcers along with warmth of the right lower extremity below the knee    Diagnostic Data:  Lab Results (last 24 hours)       Procedure Component Value Units Date/Time    Basic Metabolic Panel [[id number]]  (Abnormal)  Collected: 03/09/25 1140    Specimen: Blood Updated: 03/09/25 1212     Glucose 102 mg/dL      BUN 9 mg/dL      Creatinine 0.65 mg/dL      Sodium 137 mmol/L      Potassium 4.5 mmol/L      Comment: Specimen hemolyzed.  Result may be falsely elevated.        Chloride 103 mmol/L      CO2 24.9 mmol/L      Calcium 8.3 mg/dL      BUN/Creatinine Ratio 13.8     Anion Gap 9.1 mmol/L      eGFR 117.7 mL/min/1.73     Narrative:      GFR Categories in Chronic Kidney Disease (CKD)      GFR Category          GFR (mL/min/1.73)    Interpretation  G1                     90 or greater         Normal or high (1)  G2                      60-89                Mild decrease (1)  G3a                   45-59                Mild to moderate decrease  G3b                   30-44                Moderate to severe decrease  G4                    15-29                Severe decrease  G5                    14 or less           Kidney failure          (1)In the absence of evidence of kidney disease, neither GFR category G1 or G2 fulfill the criteria for CKD.    eGFR calculation 2021 CKD-EPI creatinine equation, which does not include race as a factor    CBC & Differential [[id number]]  (Abnormal) Collected: 03/09/25 1140    Specimen: Blood Updated: 03/09/25 1148    Narrative:      The following orders were created for panel order CBC & Differential.  Procedure                               Abnormality         Status                     ---------                               -----------         ------                     CBC Auto Differential[[id number]]        Abnormal            Final result                 Please view results for these tests on the individual orders.    CBC Auto Differential [[id number]]  (Abnormal) Collected: 03/09/25 1140    Specimen: Blood Updated: 03/09/25 1148     WBC 8.49 10*3/mm3      RBC 4.76 10*6/mm3      Hemoglobin 13.5 g/dL      Hematocrit 44.0 %      MCV 92.4 fL      MCH 28.4 pg      MCHC 30.7 g/dL      RDW 14.2 %       RDW-SD 47.8 fl      MPV 10.5 fL      Platelets 181 10*3/mm3      Neutrophil % 61.0 %      Lymphocyte % 27.1 %      Monocyte % 7.8 %      Eosinophil % 3.3 %      Basophil % 0.7 %      Immature Grans % 0.1 %      Neutrophils, Absolute 5.18 10*3/mm3      Lymphocytes, Absolute 2.30 10*3/mm3      Monocytes, Absolute 0.66 10*3/mm3      Eosinophils, Absolute 0.28 10*3/mm3      Basophils, Absolute 0.06 10*3/mm3      Immature Grans, Absolute 0.01 10*3/mm3      nRBC 0.0 /100 WBC     Wound Culture - Wound, Leg, Right [[id number]] Collected: 03/09/25 1012    Specimen: Wound from Leg, Right Updated: 03/09/25 1058     Gram Stain Many (4+) WBCs per low power field      Many (4+) Gram positive cocci      Many (4+) Gram negative bacilli             Imaging Results (Last 24 Hours)       ** No results found for the last 24 hours. **              Assessment & Plan        This is a 45 y.o. male with:    Active and Resolved Problems  Active Hospital Problems    Diagnosis  POA    **Cellulitis [L03.90]  Yes      Resolved Hospital Problems   No resolved problems to display.       Assessment and plan:    Chronic venous insufficiency of bilateral lower extremities  Right lower extremity cellulitis recurrent  Failure of outpatient therapy with Keflex and Bactrim  Started on intravenous Rocephin and doxycycline  Wound care consult requested    Chronic medical problems:  Restless leg syndrome: Ropinirole resumed  DVT: Outpatient apixaban resumed  Hypertension: Outpatient hydralazine resumed      VTE Prophylaxis:  Pharmacologic VTE prophylaxis orders are signed & held. Mechanical VTE prophylaxis orders are present.            Signature:     This document has been electronically signed by Bradly Burnett MD on March 9, 2025 13:19 EDT   [facility] Hospitalist Team

## 2025-03-09 NOTE — ED PROVIDER NOTES
Subjective   History of Present Illness  45-year-old male describes increased pain this morning in his right lower extremity along with increased drainage is foul-smelling.  He states he just finished a course yesterday of Keflex and Bactrim from the outlying facility.  States he felt somewhat chilled this morning and nauseated and had some dry heaves.  He reports no chest or abdominal pain or cough  Review of Systems    Past Medical History:   Diagnosis Date    Allergic 1979    seasonal    Arthritis 01/05/2020    legs    Asthma 01/2024    Chronic obstructive pulmonary disease 07/17/2023    COPD (chronic obstructive pulmonary disease) 04/01/2011    GERD (gastroesophageal reflux disease) 06/06/2006    Hypertension 06/06/2006    Kidney stone 08/08/2010    Low back pain 01/01/2020    Obesity 01/21/1997    Pneumonia 11/2023    Renal insufficiency 03/2021    Stroke 01/21/2015 01/21/2016    Substance abuse     Type 2 diabetes mellitus 07/27/2019    Urinary tract infection 03/15/2020       Allergies   Allergen Reactions    Promethazine Itching    Vancomycin Anaphylaxis    Calamine-Zinc Oxide Itching and Rash       Past Surgical History:   Procedure Laterality Date    APPENDECTOMY  03/2017    CHOLECYSTECTOMY  02/2016       Family History   Problem Relation Age of Onset    Arthritis Mother     COPD Mother     Diabetes Mother     Hyperlipidemia Mother     Hypertension Mother     Alcohol abuse Father     Arthritis Father     Cancer Father         SKIN    Diabetes Father     Heart disease Father     Hyperlipidemia Father     Hypertension Father     Obesity Father     Arthritis Sister     Cancer Sister         BREAST    Diabetes Sister     Hyperlipidemia Sister     Heart disease Sister     Hypertension Sister     Obesity Sister     Sleep apnea Sister     Arthritis Brother     Cancer Brother         KIDNEY/BRAIN/SKIN    Hyperlipidemia Brother     Kidney disease Brother     Hypertension Brother     Alcohol abuse Brother      Mental illness Brother         schizophrenia    Cancer Sister         COLON    Diabetes Sister             Hyperlipidemia Sister     Hypertension Sister     Diabetes Brother     Heart disease Brother     Hyperlipidemia Brother     Hypertension Brother     Alcohol abuse Brother     Cancer Maternal Grandmother     Cancer Maternal Uncle        Social History     Socioeconomic History    Marital status:    Tobacco Use    Smoking status: Former     Current packs/day: 0.00     Average packs/day: 2.0 packs/day for 10.4 years (20.8 ttl pk-yrs)     Types: Cigarettes     Start date: 1998     Quit date: 2008     Years since quittin.7     Passive exposure: Past    Smokeless tobacco: Never   Vaping Use    Vaping status: Never Used   Substance and Sexual Activity    Alcohol use: Not Currently     Alcohol/week: 12.0 standard drinks of alcohol     Types: 12 Cans of beer per week     Comment: PER DAY    Drug use: Yes     Frequency: 7.0 times per week     Types: Marijuana    Sexual activity: Yes     Partners: Female     Birth control/protection: Condom       Prior to Admission medications    Medication Sig Start Date End Date Taking? Authorizing Provider   albuterol sulfate  (90 Base) MCG/ACT inhaler Inhale 2 puffs Every 4 (Four) Hours As Needed for Wheezing. 24   Kianna Loja APRN   apixaban (Eliquis) 5 MG tablet tablet TAKE 1 TABLET BY MOUTH 2 TIMES A DAY 24   Mis Boggs APRN   brompheniramine-pseudoephedrine-DM 30-2-10 MG/5ML syrup Take 10 mL by mouth 4 (Four) Times a Day As Needed for Congestion, Cough or Allergies. 24   Kianna Loja APRN   budesonide-formoterol (Symbicort) 80-4.5 MCG/ACT inhaler Inhale 2 puffs 2 (Two) Times a Day. 23   Mis Boggs APRN   carvedilol (COREG) 25 MG tablet Take 1 tablet by mouth 2 (Two) Times a Day With Meals. 24   Mis Boggs APRN   cephalexin (KEFLEX) 500 MG capsule Take 1 capsule by mouth 4 (Four) Times  a Day. 2/18/25   Hernesto Johnston MD   hydrALAZINE (APRESOLINE) 25 MG tablet TAKE 1 TABLET BY MOUTH 3 TIMES A DAY 9/30/24   Mis Boggs APRN   hydroCHLOROthiazide 25 MG tablet TAKE 1 TABLET BY MOUTH DAILY 11/25/24   Mis Boggs APRN   HYDROcodone-acetaminophen (NORCO)  MG per tablet Take 1 tablet by mouth Every 6 (Six) Hours As Needed for Moderate Pain. 9/22/24   Tam Barnett PA-C   loperamide (Imodium A-D) 2 MG tablet Take 1 tablet by mouth 4 (Four) Times a Day As Needed for Diarrhea. 12/4/24   Kianna Loja APRN   multivitamin (THERAGRAN) tablet tablet Take 1 tablet by mouth Daily. mens    Provider, MD Aron   naloxone (NARCAN) 4 MG/0.1ML nasal spray Call 911. Don't prime. [place] in 1 nostril for overdose. Repeat in 2-3 minutes in other nostril if no or minimal breathing/responsiveness. 9/22/24   Tam Barnett PA-C   ondansetron ODT (ZOFRAN-ODT) 8 MG disintegrating tablet Place 1 tablet on the tongue Every 8 (Eight) Hours As Needed for Nausea or Vomiting. 12/4/24   Kianna Loja APRN   pantoprazole (PROTONIX) 40 MG EC tablet Take 1 tablet by mouth 2 (Two) Times a Day.    Provider, MD Aron   potassium chloride (MICRO-K) 10 MEQ CR capsule TAKE 1 CAPSULE BY MOUTH DAILY 10/31/24   Mis Boggs APRN   rOPINIRole (REQUIP) 0.25 MG tablet TAKE 1 TABLET BY MOUTH 1 HOUR BEFORE BED 9/30/24   Mis Boggs APRN   sertraline (Zoloft) 25 MG tablet Take 1 tablet by mouth Daily.  Patient taking differently: Take 1 tablet by mouth every night at bedtime. 3/22/24   Mis Boggs APRN   silver sulfadiazine (SILVADENE, SSD) 1 % cream Apply 1 Application topically to the appropriate area as directed Daily. 9/23/24   Tam Barnett PA-C   sulfamethoxazole-trimethoprim (BACTRIM DS,SEPTRA DS) 800-160 MG per tablet Take 1 tablet by mouth 2 (Two) Times a Day. 2/18/25   Hernesto Johnston MD   Tirzepatide 10 MG/0.5ML solution auto-injector Inject 10 mg under the  "skin into the appropriate area as directed 1 (One) Time Per Week. 1/9/25   Penny Rueda APRN   torsemide (DEMADEX) 100 MG tablet TAKE 1 TABLET BY MOUTH DAILY 10/28/24   Mis Boggs APRN   vitamin D3 125 MCG (5000 UT) capsule capsule Take 1 capsule by mouth Daily. 8/30/24   Mis Boggs APRN     BP (!) 153/113   Pulse 85   Temp 97.5 °F (36.4 °C) (Oral)   Resp 20   Ht 180.3 cm (71\")   Wt (!) 211 kg (465 lb)   SpO2 98%   BMI 64.85 kg/m²       Objective   Physical Exam  General: Obese male awake and alert, no acute distress  Eyes: sclera nonicteric  HEENT: Mucous membranes moist, no mucosal swelling  Neck: Supple, no nuchal rigidity,   Respirations: Respirations nonlabored, equal breath sounds bilaterally, clear lungs  Heart regular rate and rhythm, no murmurs rubs or gallops,   Abdomen soft nontender nondistended,  Extremities chronic appearing thickening and discoloration of his bilateral lower extremities with right greater than left swelling in the lower leg as well as some localized fever and erythema, there is some foul-smelling drainage from ulceration on the anterior aspect of the right lower leg, normal pulses and sensorimotor function distally, no swelling or erythema in the knee or thigh  Neuro cranial nerves grossly intact, no focal limb deficits  Psych oriented, pleasant affect  Skin no other rash, brisk cap refill  Procedures           ED Course        Results for orders placed or performed during the hospital encounter of 03/09/25   Wound Culture - Wound, Leg, Right    Collection Time: 03/09/25 10:12 AM    Specimen: Leg, Right; Wound   Result Value Ref Range    Gram Stain Many (4+) WBCs per low power field     Gram Stain Many (4+) Gram positive cocci     Gram Stain Many (4+) Gram negative bacilli    Duplex Venous Lower Extremity - RIGHT    Collection Time: 03/09/25 11:30 AM   Result Value Ref Range    Right Popliteal Spont 1.0     Right Common Femoral Spont Y     Right Common " Femoral Competent Y     Right Common Femoral Phasic Y     Right Common Femoral Compress C     Right Common Femoral Augment Y     Right Saphenofemoral Junction Compress C     Right Proximal Femoral Compress C     Right Mid Femoral Spont Y     Right Mid Femoral Competent Y     Right Mid Femoral Phasic Y     Right Mid Femoral Compress C     Right Mid Femoral Augment Y     Right Distal Femoral Spont Y     Right Popliteal Spont Y     Right Popliteal Competent N     Right Popliteal Phasic Y     Right Popliteal Compress P     Right Popliteal Augment Y     Right Popliteal Thrombus C     Right Posterior Tibial Compress C     Right Peroneal Compress C     Right Gastronemius Compress C     Right Greater Saph AK Compress C     Right Greater Saph BK Compress C     Right Lesser Saph Compress C     Right Varicosity BK Compress C     Left Common Femoral Spont Y     Left Common Femoral Competent Y     Left Common Femoral Phasic Y     Left Common Femoral Compress C     Left Common Femoral Augment Y    Basic Metabolic Panel    Collection Time: 03/09/25 11:40 AM    Specimen: Blood   Result Value Ref Range    Glucose 102 (H) 65 - 99 mg/dL    BUN 9 6 - 20 mg/dL    Creatinine 0.65 (L) 0.76 - 1.27 mg/dL    Sodium 137 136 - 145 mmol/L    Potassium 4.5 3.5 - 5.2 mmol/L    Chloride 103 98 - 107 mmol/L    CO2 24.9 22.0 - 29.0 mmol/L    Calcium 8.3 (L) 8.6 - 10.5 mg/dL    BUN/Creatinine Ratio 13.8 7.0 - 25.0    Anion Gap 9.1 5.0 - 15.0 mmol/L    eGFR 117.7 >60.0 mL/min/1.73   CBC Auto Differential    Collection Time: 03/09/25 11:40 AM    Specimen: Blood   Result Value Ref Range    WBC 8.49 3.40 - 10.80 10*3/mm3    RBC 4.76 4.14 - 5.80 10*6/mm3    Hemoglobin 13.5 13.0 - 17.7 g/dL    Hematocrit 44.0 37.5 - 51.0 %    MCV 92.4 79.0 - 97.0 fL    MCH 28.4 26.6 - 33.0 pg    MCHC 30.7 (L) 31.5 - 35.7 g/dL    RDW 14.2 12.3 - 15.4 %    RDW-SD 47.8 37.0 - 54.0 fl    MPV 10.5 6.0 - 12.0 fL    Platelets 181 140 - 450 10*3/mm3    Neutrophil % 61.0 42.7 -  76.0 %    Lymphocyte % 27.1 19.6 - 45.3 %    Monocyte % 7.8 5.0 - 12.0 %    Eosinophil % 3.3 0.3 - 6.2 %    Basophil % 0.7 0.0 - 1.5 %    Immature Grans % 0.1 0.0 - 0.5 %    Neutrophils, Absolute 5.18 1.70 - 7.00 10*3/mm3    Lymphocytes, Absolute 2.30 0.70 - 3.10 10*3/mm3    Monocytes, Absolute 0.66 0.10 - 0.90 10*3/mm3    Eosinophils, Absolute 0.28 0.00 - 0.40 10*3/mm3    Basophils, Absolute 0.06 0.00 - 0.20 10*3/mm3    Immature Grans, Absolute 0.01 0.00 - 0.05 10*3/mm3    nRBC 0.0 0.0 - 0.2 /100 WBC                                                    Medical Decision Making  Patient presents with some chronic wound on his right leg with some apparent cellulitic change.  There is no sign of necrotizing fasciitis or compartment syndrome, he has normal perfusion of the extremity.  Patient has been on antibiotics and apparently failing outpatient cellulitic treatment.  He was ordered some IV Rocephin.  Notably has a vancomycin allergy.  Wound culture is pending.  Case discussed with Dr. Burnett with the hospitalist service who was agreeable with the plan of admission.    Amount and/or Complexity of Data Reviewed  Labs: ordered. Decision-making details documented in ED Course.     Details: CBC no leukocytosis, Chem-7 essentially normal, wound culture pending  Discussion of management or test interpretation with external provider(s): Discussion with the vascular tech patient does have a chronic appearing DVT without obstruction of flow, patient on chronic anticoagulation with Eliquis    Risk  OTC drugs.  Prescription drug management.  Decision regarding hospitalization.        Final diagnoses:   Cellulitis of right leg       ED Disposition  ED Disposition       ED Disposition   Decision to Admit    Condition   --    Comment   Level of Care: Med/Surg [1]   Admitting Physician: SHAHRZAD BURNETT [[id number]]   Attending Physician: SHAHRZAD BURNETT [[id number]]                 No follow-up provider specified.       Medication List       No changes were made to your prescriptions during this visit.            Rich Yo MD  03/09/25 1180

## 2025-03-10 LAB
BASOPHILS # BLD AUTO: 0.06 10*3/MM3 (ref 0–0.2)
BASOPHILS NFR BLD AUTO: 0.7 % (ref 0–1.5)
DEPRECATED RDW RBC AUTO: 46.5 FL (ref 37–54)
EOSINOPHIL # BLD AUTO: 0.31 10*3/MM3 (ref 0–0.4)
EOSINOPHIL NFR BLD AUTO: 3.7 % (ref 0.3–6.2)
ERYTHROCYTE [DISTWIDTH] IN BLOOD BY AUTOMATED COUNT: 14.1 % (ref 12.3–15.4)
HCT VFR BLD AUTO: 43.6 % (ref 37.5–51)
HGB BLD-MCNC: 13.6 G/DL (ref 13–17.7)
IMM GRANULOCYTES # BLD AUTO: 0.03 10*3/MM3 (ref 0–0.05)
IMM GRANULOCYTES NFR BLD AUTO: 0.4 % (ref 0–0.5)
LYMPHOCYTES # BLD AUTO: 2.37 10*3/MM3 (ref 0.7–3.1)
LYMPHOCYTES NFR BLD AUTO: 28.6 % (ref 19.6–45.3)
MAGNESIUM SERPL-MCNC: 1.7 MG/DL (ref 1.6–2.6)
MCH RBC QN AUTO: 28.2 PG (ref 26.6–33)
MCHC RBC AUTO-ENTMCNC: 31.2 G/DL (ref 31.5–35.7)
MCV RBC AUTO: 90.3 FL (ref 79–97)
MONOCYTES # BLD AUTO: 0.66 10*3/MM3 (ref 0.1–0.9)
MONOCYTES NFR BLD AUTO: 8 % (ref 5–12)
NEUTROPHILS NFR BLD AUTO: 4.85 10*3/MM3 (ref 1.7–7)
NEUTROPHILS NFR BLD AUTO: 58.6 % (ref 42.7–76)
NRBC BLD AUTO-RTO: 0 /100 WBC (ref 0–0.2)
PHOSPHATE SERPL-MCNC: 3.4 MG/DL (ref 2.5–4.5)
PLATELET # BLD AUTO: 182 10*3/MM3 (ref 140–450)
PMV BLD AUTO: 10.6 FL (ref 6–12)
RBC # BLD AUTO: 4.83 10*6/MM3 (ref 4.14–5.8)
WBC NRBC COR # BLD AUTO: 8.28 10*3/MM3 (ref 3.4–10.8)

## 2025-03-10 PROCEDURE — 05HF33Z INSERTION OF INFUSION DEVICE INTO LEFT CEPHALIC VEIN, PERCUTANEOUS APPROACH: ICD-10-PCS | Performed by: STUDENT IN AN ORGANIZED HEALTH CARE EDUCATION/TRAINING PROGRAM

## 2025-03-10 PROCEDURE — 83735 ASSAY OF MAGNESIUM: CPT | Performed by: HOSPITALIST

## 2025-03-10 PROCEDURE — 25010000002 CEFTAROLINE FOSAMIL PER 10 MG: Performed by: INTERNAL MEDICINE

## 2025-03-10 PROCEDURE — 94799 UNLISTED PULMONARY SVC/PX: CPT

## 2025-03-10 PROCEDURE — C1751 CATH, INF, PER/CENT/MIDLINE: HCPCS

## 2025-03-10 PROCEDURE — 25010000002 CLINDAMYCIN PER 300 MG: Performed by: INTERNAL MEDICINE

## 2025-03-10 PROCEDURE — 36410 VNPNXR 3YR/> PHY/QHP DX/THER: CPT

## 2025-03-10 PROCEDURE — 85025 COMPLETE CBC W/AUTO DIFF WBC: CPT | Performed by: HOSPITALIST

## 2025-03-10 PROCEDURE — 84100 ASSAY OF PHOSPHORUS: CPT | Performed by: HOSPITALIST

## 2025-03-10 PROCEDURE — 94664 DEMO&/EVAL PT USE INHALER: CPT

## 2025-03-10 PROCEDURE — 87040 BLOOD CULTURE FOR BACTERIA: CPT | Performed by: INTERNAL MEDICINE

## 2025-03-10 RX ORDER — SERTRALINE HYDROCHLORIDE 25 MG/1
25 TABLET, FILM COATED ORAL DAILY
Status: DISCONTINUED | OUTPATIENT
Start: 2025-03-10 | End: 2025-03-13 | Stop reason: HOSPADM

## 2025-03-10 RX ORDER — CLINDAMYCIN PHOSPHATE 900 MG/50ML
900 INJECTION, SOLUTION INTRAVENOUS EVERY 8 HOURS
Status: DISCONTINUED | OUTPATIENT
Start: 2025-03-10 | End: 2025-03-12

## 2025-03-10 RX ORDER — OXYCODONE HYDROCHLORIDE 5 MG/1
10 TABLET ORAL EVERY 8 HOURS PRN
Refills: 0 | Status: DISCONTINUED | OUTPATIENT
Start: 2025-03-10 | End: 2025-03-12

## 2025-03-10 RX ADMIN — HYDRALAZINE HYDROCHLORIDE 25 MG: 25 TABLET ORAL at 17:20

## 2025-03-10 RX ADMIN — CARVEDILOL 25 MG: 25 TABLET, FILM COATED ORAL at 17:20

## 2025-03-10 RX ADMIN — ROPINIROLE HYDROCHLORIDE 0.25 MG: 0.25 TABLET, FILM COATED ORAL at 08:25

## 2025-03-10 RX ADMIN — OXYCODONE 10 MG: 5 TABLET ORAL at 00:39

## 2025-03-10 RX ADMIN — THERA TABS 1 TABLET: TAB at 08:25

## 2025-03-10 RX ADMIN — PANTOPRAZOLE SODIUM 40 MG: 40 TABLET, DELAYED RELEASE ORAL at 08:26

## 2025-03-10 RX ADMIN — OXYCODONE 10 MG: 5 TABLET ORAL at 23:13

## 2025-03-10 RX ADMIN — HYDRALAZINE HYDROCHLORIDE 25 MG: 25 TABLET ORAL at 08:26

## 2025-03-10 RX ADMIN — HYDROCHLOROTHIAZIDE 25 MG: 25 TABLET ORAL at 08:25

## 2025-03-10 RX ADMIN — Medication 10 ML: at 20:29

## 2025-03-10 RX ADMIN — CLINDAMYCIN PHOSPHATE 900 MG: 900 INJECTION, SOLUTION INTRAVENOUS at 20:29

## 2025-03-10 RX ADMIN — CARVEDILOL 25 MG: 25 TABLET, FILM COATED ORAL at 08:25

## 2025-03-10 RX ADMIN — APIXABAN 5 MG: 5 TABLET, FILM COATED ORAL at 08:26

## 2025-03-10 RX ADMIN — CEFTAROLINE FOSAMIL 600 MG: 600 POWDER, FOR SOLUTION INTRAVENOUS at 15:35

## 2025-03-10 RX ADMIN — PANTOPRAZOLE SODIUM 40 MG: 40 TABLET, DELAYED RELEASE ORAL at 20:29

## 2025-03-10 RX ADMIN — APIXABAN 5 MG: 5 TABLET, FILM COATED ORAL at 20:29

## 2025-03-10 RX ADMIN — BUDESONIDE AND FORMOTEROL FUMARATE DIHYDRATE 2 PUFF: 160; 4.5 AEROSOL RESPIRATORY (INHALATION) at 07:30

## 2025-03-10 RX ADMIN — DOXYCYCLINE 100 MG: 100 INJECTION, POWDER, LYOPHILIZED, FOR SOLUTION INTRAVENOUS at 00:39

## 2025-03-10 RX ADMIN — HYDRALAZINE HYDROCHLORIDE 25 MG: 25 TABLET ORAL at 20:29

## 2025-03-10 RX ADMIN — CLINDAMYCIN PHOSPHATE 900 MG: 900 INJECTION, SOLUTION INTRAVENOUS at 15:01

## 2025-03-10 RX ADMIN — Medication 10 ML: at 08:26

## 2025-03-10 RX ADMIN — SERTRALINE HYDROCHLORIDE 25 MG: 50 TABLET, FILM COATED ORAL at 08:26

## 2025-03-10 RX ADMIN — OXYCODONE 10 MG: 5 TABLET ORAL at 08:25

## 2025-03-10 NOTE — PLAN OF CARE
Problem: Adult Inpatient Plan of Care  Goal: Plan of Care Review  Outcome: Progressing  Goal: Patient-Specific Goal (Individualized)  Outcome: Progressing  Goal: Absence of Hospital-Acquired Illness or Injury  Outcome: Progressing  Intervention: Identify and Manage Fall Risk  Recent Flowsheet Documentation  Taken 3/10/2025 0000 by Marian Kline RN  Safety Promotion/Fall Prevention: safety round/check completed  Taken 3/9/2025 2300 by Marian Kline RN  Safety Promotion/Fall Prevention: safety round/check completed  Taken 3/9/2025 2200 by Marian Kline RN  Safety Promotion/Fall Prevention: safety round/check completed  Taken 3/9/2025 2100 by Marian Kline RN  Safety Promotion/Fall Prevention: safety round/check completed  Intervention: Prevent Skin Injury  Recent Flowsheet Documentation  Taken 3/9/2025 2100 by Marina Kline RN  Body Position: position changed independently  Skin Protection: pulse oximeter probe site changed  Intervention: Prevent and Manage VTE (Venous Thromboembolism) Risk  Recent Flowsheet Documentation  Taken 3/9/2025 2100 by Marian Kline RN  VTE Prevention/Management: (wounds)   bilateral   SCDs (sequential compression devices) off   patient refused intervention  Goal: Optimal Comfort and Wellbeing  Outcome: Progressing  Intervention: Monitor Pain and Promote Comfort  Recent Flowsheet Documentation  Taken 3/10/2025 0039 by Marian Kline RN  Pain Management Interventions: pain medication given  Intervention: Provide Person-Centered Care  Recent Flowsheet Documentation  Taken 3/9/2025 2100 by Marian Kline RN  Trust Relationship/Rapport:   care explained   choices provided   thoughts/feelings acknowledged  Goal: Readiness for Transition of Care  Outcome: Progressing  Intervention: Mutually Develop Transition Plan  Recent Flowsheet Documentation  Taken 3/10/2025 0132 by Marian Kline RN  Equipment Currently Used at Home: none  Taken 3/10/2025 0130 by Eliud  Marian ROBERTO, RN  Transportation Anticipated:   car, drives self   family or friend will provide  Patient/Family Anticipated Services at Transition: none  Patient/Family Anticipates Transition to: home     Problem: Infection  Goal: Absence of Infection Signs and Symptoms  Outcome: Progressing     Problem: Pain Acute  Goal: Optimal Pain Control and Function  Outcome: Progressing  Intervention: Develop Pain Management Plan  Recent Flowsheet Documentation  Taken 3/10/2025 0039 by Marian Kline, RN  Pain Management Interventions: pain medication given   Goal Outcome Evaluation:

## 2025-03-10 NOTE — CONSULTS
Midline Line Insertion Procedure Note    Procedure: Insertion of #18G/10CM PowerMidline    Indications:  Home IV therapy, Poor Access, Pt./Dr. Preference    Procedure Details:   Sterile technique was used including antiseptics, cap, gloves, gown, hand hygiene, mask, and sheet.    #18G/10CM PowerMidline inserted to the L Cephalic vein per hospital protocol by Brandon Guillaume RN.     Non-pulsatile blood return: yes    Ultrasound Guidance: Yes    Lot #: [id number]  Expiration date: 2025-11-30    Complications:  none    Findings:  Catheter inserted to 10 cm, with 0 cm exposed.   Mid upper arm circumference is 52 cm.  Catheter was flushed with 10 cc NS and sterile dressing applied.   Patient tolerated procedure well.    Recommendations:  Verbal and/or written Care/Maintenance instructions provided to patient.   Primary nurse notified that midline is okay to use at this time.     Tam Guillaume RN  03/10/25  15:12 EDT

## 2025-03-10 NOTE — CONSULTS
Infectious Diseases Consult Note    Referring Provider: Maritza Krause MD    Reason for Consultation: Right leg cellulitis    Patient Care Team:  Provider, No Known as PCP - Penny Worrell APRN as Nurse Practitioner (Nurse Practitioner)  Sameera eBthea RD as Dietitian (Nutrition)    Chief complaint right leg redness and drainage    Subjective     History of present illness:      This is a 45-year-old male with past medical history significant for morbid obesity, chronic venous stasis changes and borderline diabetes who presented to [facility] on [facility], 2025.  Patient stated that he started noticing a wound in his right lower leg and was draining some and was seen in the hospital and was placed on Keflex and Bactrim for 10 days.  Since his symptoms did not improve he presented to the hospital yesterday with worsening leg edema, erythema and pain.  He was having some chills at home    Review of Systems   Review of Systems   Constitutional: Negative.    HENT: Negative.     Eyes: Negative.    Respiratory: Negative.     Cardiovascular:  Positive for leg swelling.   Gastrointestinal: Negative.    Genitourinary: Negative.    Musculoskeletal: Negative.    Skin:  Positive for wound.   Neurological: Negative.    Hematological: Negative.    Psychiatric/Behavioral: Negative.         Medications  (Not in a hospital admission)      History  Past Medical History:   Diagnosis Date    Allergic 1979    seasonal    Arthritis 01/05/2020    legs    Asthma 01/2024    Chronic obstructive pulmonary disease 07/17/2023    COPD (chronic obstructive pulmonary disease) 04/01/2011    GERD (gastroesophageal reflux disease) 06/06/2006    Hypertension 06/06/2006    Kidney stone 08/08/2010    Low back pain 01/01/2020    Obesity 01/21/1997    Pneumonia 11/2023    Renal insufficiency 03/2021    Stroke 01/21/2015 01/21/2016    Substance abuse     Type 2 diabetes mellitus 07/27/2019    Urinary tract infection  03/15/2020     Past Surgical History:   Procedure Laterality Date    APPENDECTOMY  2017    CHOLECYSTECTOMY  2016       Family History  Family History   Problem Relation Age of Onset    Arthritis Mother     COPD Mother     Diabetes Mother     Hyperlipidemia Mother     Hypertension Mother     Alcohol abuse Father     Arthritis Father     Cancer Father         SKIN    Diabetes Father     Heart disease Father     Hyperlipidemia Father     Hypertension Father     Obesity Father     Arthritis Sister     Cancer Sister         BREAST    Diabetes Sister     Hyperlipidemia Sister     Heart disease Sister     Hypertension Sister     Obesity Sister     Sleep apnea Sister     Arthritis Brother     Cancer Brother         KIDNEY/BRAIN/SKIN    Hyperlipidemia Brother     Kidney disease Brother     Hypertension Brother     Alcohol abuse Brother     Mental illness Brother         schizophrenia    Cancer Sister         COLON    Diabetes Sister             Hyperlipidemia Sister     Hypertension Sister     Diabetes Brother     Heart disease Brother     Hyperlipidemia Brother     Hypertension Brother     Alcohol abuse Brother     Cancer Maternal Grandmother     Cancer Maternal Uncle        Social History   reports that he quit smoking about 16 years ago. His smoking use included cigarettes. He started smoking about 27 years ago. He has a 20.8 pack-year smoking history. He has been exposed to tobacco smoke. He has never used smokeless tobacco. He reports that he does not currently use alcohol after a past usage of about 12.0 standard drinks of alcohol per week. He reports current drug use. Frequency: 7.00 times per week. Drug: Marijuana.    Allergies  Promethazine, Vancomycin, and Calamine-zinc oxide    Objective     Vital Signs   Vital Signs (last 24 hours)          0700  03/10 0659 03/10 0700  03/10 1233   Most Recent      Temp (°F) 97.5 -  98.6      97.5     97.5 (36.4) 03/10 1142    Heart Rate 75 -  89    67 -  83      67 03/10 1142    Resp 15 -  20      16     16 03/10 1142    /89 -  185/114      122/80     122/80 03/10 1142    SpO2 (%) 95 -  100    96 -  99     96 03/10 1142            Physical Exam:  Physical Exam  Vitals and nursing note reviewed.   Constitutional:       Appearance: He is well-developed. He is obese.   HENT:      Head: Normocephalic and atraumatic.   Eyes:      Pupils: Pupils are equal, round, and reactive to light.   Cardiovascular:      Rate and Rhythm: Normal rate and regular rhythm.      Heart sounds: Normal heart sounds.   Pulmonary:      Effort: Pulmonary effort is normal. No respiratory distress.      Breath sounds: Normal breath sounds. No wheezing or rales.   Abdominal:      General: Bowel sounds are normal. There is no distension.      Palpations: Abdomen is soft. There is no mass.      Tenderness: There is no abdominal tenderness. There is no guarding or rebound.   Musculoskeletal:         General: No deformity. Normal range of motion.      Cervical back: Normal range of motion and neck supple.      Right lower leg: Edema present.      Left lower leg: Edema present.      Comments: Chronic bilateral venous stasis changes    Superficial wound on the anterior right lower leg with minimal drainage.  There is a subtle streak of erythema in the medial aspect of the right thigh   Skin:     General: Skin is warm.      Findings: No erythema or rash.   Neurological:      Mental Status: He is alert and oriented to person, place, and time.      Cranial Nerves: No cranial nerve deficit.         Microbiology  Microbiology Results (last 10 days)       Procedure Component Value - Date/Time    Wound Culture - Wound, Leg, Right [[id number]] Collected: 03/09/25 1012    Lab Status: Preliminary result Specimen: Wound from Leg, Right Updated: 03/10/25 1035     Wound Culture Culture in progress     Gram Stain Many (4+) WBCs per low power field      Many (4+) Gram positive cocci      Many (4+) Gram negative bacilli             Laboratory  Results from last 7 days   Lab Units 03/10/25  0937   WBC 10*3/mm3 8.28   HEMOGLOBIN g/dL 13.6   HEMATOCRIT % 43.6   PLATELETS 10*3/mm3 182     Results from last 7 days   Lab Units 03/09/25  1140   SODIUM mmol/L 137   POTASSIUM mmol/L 4.5   CHLORIDE mmol/L 103   CO2 mmol/L 24.9   BUN mg/dL 9   CREATININE mg/dL 0.65*   GLUCOSE mg/dL 102*   CALCIUM mg/dL 8.3*     Results from last 7 days   Lab Units 03/09/25  1140   SODIUM mmol/L 137   POTASSIUM mmol/L 4.5   CHLORIDE mmol/L 103   CO2 mmol/L 24.9   BUN mg/dL 9   CREATININE mg/dL 0.65*   GLUCOSE mg/dL 102*   CALCIUM mg/dL 8.3*                   Radiology  Imaging Results (Last 72 Hours)       ** No results found for the last 72 hours. **            Cardiology      Results Review:  I have reviewed all clinical data, test, lab, and imaging results.       Schedule Meds  acetaminophen, 1,000 mg, Oral, Once  apixaban, 5 mg, Oral, BID  budesonide-formoterol, 2 puff, Inhalation, BID - RT  carvedilol, 25 mg, Oral, BID With Meals  ceftaroline, 600 mg, Intravenous, Q8H  clindamycin, 900 mg, Intravenous, Q8H  hydrALAZINE, 25 mg, Oral, TID  hydroCHLOROthiazide, 25 mg, Oral, Daily  multivitamin, 1 tablet, Oral, Daily  pantoprazole, 40 mg, Oral, BID  rOPINIRole, 0.25 mg, Oral, Daily  sertraline, 25 mg, Oral, Daily  sodium chloride, 10 mL, Intravenous, Q12H        Infusion Meds       PRN Meds    albuterol sulfate HFA    oxyCODONE    [COMPLETED] Insert Peripheral IV **AND** sodium chloride    sodium chloride      Assessment & Plan       Assessment    Right lower extremity cellulitis with open superficial wound with minimal drainage.  There is minimal streak above the knee.  I am suspecting Staphylococcus aureus or beta-hemolytic Streptococcus as a cause of infection.    Chronic bilateral lower extremities venous stasis changes    Morbid obesity with BMI of 65    Borderline diabetes    Plan    Discontinue doxycycline  Start ceftaroline 600 mg IV every 8  hours  Start clindamycin 900 mg every 8 hours  Can de-escalate to p.o. antibiotics when patient improved  Blood cultures x 2 sets  A.m. labs    Heber Carballo MD  03/10/25  12:33 EDT    Note is dictated utilizing voice recognition software/Dragon

## 2025-03-10 NOTE — PROGRESS NOTES
[facility] MEDICINE SERVICE  DAILY PROGRESS NOTE    NAME: Farhat Hein  : 1980  MRN: [id number]      LOS: 1 day     PROVIDER OF SERVICE: Maritza Krause MD    Chief Complaint: Cellulitis    Subjective:     Interval History:    Patient seen and evaluated at bedside. Reports right leg pain and swelling. Previously attempted to treat with oral medications outpatient without resolution.     Treatment plan discussed with patient. All questions addressed.     Review of Systems:   Denies fevers, chills  Denies chest pain, +edema  Denies shortness of breath, cough  Denies nausea, vomiting, diarrhea  Denies dysuria, hematuria  +Bilateral lower extremity wounds and pain, right greater than left    Objective:     Vital Signs  Temp:  [97.5 °F (36.4 °C)-98.6 °F (37 °C)] 98.3 °F (36.8 °C)  Heart Rate:  [75-89] 79  Resp:  [15-20] 17  BP: (135-185)/() 137/85   Body mass index is 64.85 kg/m².    Physical Exam   General: No acute distress, alert and oriented, obese  CV: RRR, + peripheral edema  Pulm: CTAB, no increased work of breathing  Abd: Soft, nontender, nondistended  Skin: Bilateral lower extremity wounds with chronic venous stasis changes  Psych: Appropriate mood and affect    Scheduled Meds   acetaminophen, 1,000 mg, Oral, Once  apixaban, 5 mg, Oral, BID  budesonide-formoterol, 2 puff, Inhalation, BID - RT  carvedilol, 25 mg, Oral, BID With Meals  doxycycline, 100 mg, Intravenous, Q12H  hydrALAZINE, 25 mg, Oral, TID  hydroCHLOROthiazide, 25 mg, Oral, Daily  multivitamin, 1 tablet, Oral, Daily  pantoprazole, 40 mg, Oral, BID  rOPINIRole, 0.25 mg, Oral, Daily  sodium chloride, 10 mL, Intravenous, Q12H       PRN Meds     albuterol sulfate HFA    oxyCODONE    [COMPLETED] Insert Peripheral IV **AND** sodium chloride    sodium chloride    sodium chloride   Infusions         Diagnostic Data    Results from last 7 days   Lab Units 25  1140   WBC 10*3/mm3 8.49   HEMOGLOBIN g/dL 13.5   HEMATOCRIT % 44.0    PLATELETS 10*3/mm3 181   GLUCOSE mg/dL 102*   CREATININE mg/dL 0.65*   BUN mg/dL 9   SODIUM mmol/L 137   POTASSIUM mmol/L 4.5   ANION GAP mmol/L 9.1       No radiology results for the last day    Interval results reviewed.    Assessment/Plan:     Right lower extremity cellulitis, recurrent  Chronic venous insufficiency  - Failed outpatient therapy  - ID consulted, appreciate recs  - Continue IV ceftaroline, clindamycin  - Blood cultures pending  - Wound care consulted, appreciate assistance    Restless leg syndrome  History of DVT  Factor V Leiden  Hypertension  - Resume home medications as appropriate    VTE Prophylaxis:  Pharmacologic & mechanical VTE prophylaxis orders are present.    Code status is   Code Status and Medical Interventions: CPR (Attempt to Resuscitate); Full Support   Ordered at: 03/09/25 1257     Code Status (Patient has no pulse and is not breathing):    CPR (Attempt to Resuscitate)     Medical Interventions (Patient has pulse or is breathing):    Full Support       Plan for disposition: Home 3/13/25    Barriers to discharge: IV antibiotics, cultures pending    Time: 35+ minutes     Signature: Electronically signed by Maritza Krause MD, 03/10/25, 07:31 EDT.  [facility] Hospitalist Team

## 2025-03-10 NOTE — NURSING NOTE
CWOCN note.  45M admitted 3/9 for cellulitis. ID following, confirmed okay to wrap with kerlix and ace wrap.    BLE chronic skin changes related to venous stasis. R>L. RLE with +4 edema, LLE +3-4 edema. +1 pedal pulses with good capillary refill bilaterally.  R anterior leg wound consistent with venous ulcer. Moist red wound bed with thick red drainage. Tender to touch. 1.75x2cm  L anterior lower leg with scab noted and scarring in surrounding tissue.     Cleansed legs with bath wipe, cleansed with saline. Applied lotion to intact skin. Cleansed wounds with saline. Covered RLE wound with silicone ag foam. Wrapped BLE with x2 kerlix and x2 4in ace wrap.

## 2025-03-10 NOTE — CASE MANAGEMENT/SOCIAL WORK
Discharge Planning Assessment   [place]     Patient Name: Farhat Hein  MRN: [id number]  Today's Date: 3/10/2025    Admit Date: 3/9/2025    Plan: Home   Discharge Needs Assessment       Row Name 03/10/25 0828       Living Environment    People in Home spouse    Name(s) of People in Home Spouse Quynh    Current Living Arrangements home    Potentially Unsafe Housing Conditions none    In the past 12 months has the electric, gas, oil, or water company threatened to shut off services in your home? No    Primary Care Provided by self    Provides Primary Care For no one    Family Caregiver if Needed spouse    Family Caregiver Names Spouse Quynh    Quality of Family Relationships helpful;involved;supportive    Able to Return to Prior Arrangements yes       Resource/Environmental Concerns    Resource/Environmental Concerns none    Transportation Concerns none       Transportation Needs    In the past 12 months, has lack of transportation kept you from medical appointments or from getting medications? no    In the past 12 months, has lack of transportation kept you from meetings, work, or from getting things needed for daily living? No       Food Insecurity    Within the past 12 months, you worried that your food would run out before you got the money to buy more. Never true    Within the past 12 months, the food you bought just didn't last and you didn't have money to get more. Never true       Transition Planning    Patient/Family Anticipates Transition to home with family    Patient/Family Anticipated Services at Transition none    Transportation Anticipated car, drives self  Pt. has his vehicle at the hospital and wants to drive home.       Discharge Needs Assessment    Readmission Within the Last 30 Days no previous admission in last 30 days    Equipment Currently Used at Home rollator;cane, straight    Do you want help finding or keeping work or a job? I do not need or want help    Do you want help with school or  training? For example, starting or completing job training or getting a high school diploma, GED or equivalent No                Discharge Plan       Row Name 03/10/25 0829       Plan    Plan Home    Patient/Family in Agreement with Plan yes    Plan Comments CM spoke to ptGildardo Dougherty at bedside. PCP and pharmacy confirmed. Pt. denies financial, transportation or medication issues. Pt. has his vehicle here at the hospital and wants to drive home. DC barriers: ID consult.              Demographic Summary       Row Name 03/10/25 0861       General Information    Admission Type inpatient    Arrived From home    Required Notices Provided Important Message from Medicare    Referral Source admission list    Reason for Consult discharge planning    Preferred Language English       Contact Information    Permission Granted to Share Info With     Contact Information Obtained for                 Functional Status       Row Name 03/10/25 0824       Functional Status    Usual Activity Tolerance good    Current Activity Tolerance moderate       Physical Activity    On average, how many days per week do you engage in moderate to strenuous exercise (like a brisk walk)? 7 days    On average, how many minutes do you engage in exercise at this level? 120 min    Number of minutes of exercise per week 840       Functional Status, IADL    Medications independent    Meal Preparation independent    Housekeeping assistive person  Spouse Quynh    Laundry assistive person  Spouse Quynh    Shopping assistive person  Spouse Quynh    If for any reason you need help with day-to-day activities such as bathing, preparing meals, shopping, managing finances, etc., do you get the help you need? I don't need any help                  Patient Forms       Row Name 03/10/25 0831       Patient Forms    Important Message from Medicare (IMM) --  IMM per registration 3/9/2025               Heidi Pinzon RN    Office:  [phone]  Fax: [phone]  [email or web address]

## 2025-03-11 LAB
ALBUMIN SERPL-MCNC: 3.7 G/DL (ref 3.5–5.2)
ALBUMIN/GLOB SERPL: 1 G/DL
ALP SERPL-CCNC: 82 U/L (ref 39–117)
ALT SERPL W P-5'-P-CCNC: 30 U/L (ref 1–41)
ANION GAP SERPL CALCULATED.3IONS-SCNC: 9.5 MMOL/L (ref 5–15)
AST SERPL-CCNC: 27 U/L (ref 1–40)
BASOPHILS # BLD AUTO: 0.06 10*3/MM3 (ref 0–0.2)
BASOPHILS NFR BLD AUTO: 0.7 % (ref 0–1.5)
BILIRUB SERPL-MCNC: 0.5 MG/DL (ref 0–1.2)
BUN SERPL-MCNC: 12 MG/DL (ref 6–20)
BUN/CREAT SERPL: 13.2 (ref 7–25)
CALCIUM SPEC-SCNC: 8.9 MG/DL (ref 8.6–10.5)
CHLORIDE SERPL-SCNC: 97 MMOL/L (ref 98–107)
CO2 SERPL-SCNC: 27.5 MMOL/L (ref 22–29)
CREAT SERPL-MCNC: 0.91 MG/DL (ref 0.76–1.27)
DEPRECATED RDW RBC AUTO: 46.6 FL (ref 37–54)
EGFRCR SERPLBLD CKD-EPI 2021: 105.3 ML/MIN/1.73
EOSINOPHIL # BLD AUTO: 0.22 10*3/MM3 (ref 0–0.4)
EOSINOPHIL NFR BLD AUTO: 2.5 % (ref 0.3–6.2)
ERYTHROCYTE [DISTWIDTH] IN BLOOD BY AUTOMATED COUNT: 14.1 % (ref 12.3–15.4)
GLOBULIN UR ELPH-MCNC: 3.8 GM/DL
GLUCOSE SERPL-MCNC: 139 MG/DL (ref 65–99)
HCT VFR BLD AUTO: 42.3 % (ref 37.5–51)
HGB BLD-MCNC: 13.2 G/DL (ref 13–17.7)
IMM GRANULOCYTES # BLD AUTO: 0.03 10*3/MM3 (ref 0–0.05)
IMM GRANULOCYTES NFR BLD AUTO: 0.3 % (ref 0–0.5)
LYMPHOCYTES # BLD AUTO: 1.97 10*3/MM3 (ref 0.7–3.1)
LYMPHOCYTES NFR BLD AUTO: 22.8 % (ref 19.6–45.3)
MCH RBC QN AUTO: 28.2 PG (ref 26.6–33)
MCHC RBC AUTO-ENTMCNC: 31.2 G/DL (ref 31.5–35.7)
MCV RBC AUTO: 90.4 FL (ref 79–97)
MONOCYTES # BLD AUTO: 0.68 10*3/MM3 (ref 0.1–0.9)
MONOCYTES NFR BLD AUTO: 7.9 % (ref 5–12)
NEUTROPHILS NFR BLD AUTO: 5.67 10*3/MM3 (ref 1.7–7)
NEUTROPHILS NFR BLD AUTO: 65.8 % (ref 42.7–76)
NRBC BLD AUTO-RTO: 0 /100 WBC (ref 0–0.2)
PHOSPHATE SERPL-MCNC: 3 MG/DL (ref 2.5–4.5)
PLATELET # BLD AUTO: 183 10*3/MM3 (ref 140–450)
PMV BLD AUTO: 10.5 FL (ref 6–12)
POTASSIUM SERPL-SCNC: 3.8 MMOL/L (ref 3.5–5.2)
PROT SERPL-MCNC: 7.5 G/DL (ref 6–8.5)
RBC # BLD AUTO: 4.68 10*6/MM3 (ref 4.14–5.8)
SODIUM SERPL-SCNC: 134 MMOL/L (ref 136–145)
WBC NRBC COR # BLD AUTO: 8.63 10*3/MM3 (ref 3.4–10.8)

## 2025-03-11 PROCEDURE — 25010000002 CLINDAMYCIN PER 300 MG: Performed by: INTERNAL MEDICINE

## 2025-03-11 PROCEDURE — 85025 COMPLETE CBC W/AUTO DIFF WBC: CPT | Performed by: HOSPITALIST

## 2025-03-11 PROCEDURE — 94799 UNLISTED PULMONARY SVC/PX: CPT

## 2025-03-11 PROCEDURE — 94664 DEMO&/EVAL PT USE INHALER: CPT

## 2025-03-11 PROCEDURE — 84100 ASSAY OF PHOSPHORUS: CPT | Performed by: HOSPITALIST

## 2025-03-11 PROCEDURE — 25010000002 ONDANSETRON PER 1 MG: Performed by: STUDENT IN AN ORGANIZED HEALTH CARE EDUCATION/TRAINING PROGRAM

## 2025-03-11 PROCEDURE — 25010000002 CEFTAROLINE FOSAMIL PER 10 MG: Performed by: INTERNAL MEDICINE

## 2025-03-11 PROCEDURE — 80053 COMPREHEN METABOLIC PANEL: CPT | Performed by: INTERNAL MEDICINE

## 2025-03-11 RX ORDER — ONDANSETRON 2 MG/ML
4 INJECTION INTRAMUSCULAR; INTRAVENOUS EVERY 6 HOURS PRN
Status: DISCONTINUED | OUTPATIENT
Start: 2025-03-11 | End: 2025-03-13 | Stop reason: HOSPADM

## 2025-03-11 RX ORDER — HYDRALAZINE HYDROCHLORIDE 25 MG/1
50 TABLET, FILM COATED ORAL 3 TIMES DAILY
Status: DISCONTINUED | OUTPATIENT
Start: 2025-03-11 | End: 2025-03-13 | Stop reason: HOSPADM

## 2025-03-11 RX ADMIN — HYDROCHLOROTHIAZIDE 25 MG: 25 TABLET ORAL at 08:17

## 2025-03-11 RX ADMIN — CEFTAROLINE FOSAMIL 600 MG: 600 POWDER, FOR SOLUTION INTRAVENOUS at 13:54

## 2025-03-11 RX ADMIN — ROPINIROLE HYDROCHLORIDE 0.25 MG: 0.25 TABLET, FILM COATED ORAL at 08:18

## 2025-03-11 RX ADMIN — CLINDAMYCIN PHOSPHATE 900 MG: 900 INJECTION, SOLUTION INTRAVENOUS at 13:10

## 2025-03-11 RX ADMIN — CARVEDILOL 25 MG: 25 TABLET, FILM COATED ORAL at 17:20

## 2025-03-11 RX ADMIN — APIXABAN 5 MG: 5 TABLET, FILM COATED ORAL at 08:18

## 2025-03-11 RX ADMIN — BUDESONIDE AND FORMOTEROL FUMARATE DIHYDRATE 2 PUFF: 160; 4.5 AEROSOL RESPIRATORY (INHALATION) at 11:29

## 2025-03-11 RX ADMIN — OXYCODONE 10 MG: 5 TABLET ORAL at 17:35

## 2025-03-11 RX ADMIN — HYDRALAZINE HYDROCHLORIDE 50 MG: 25 TABLET ORAL at 17:20

## 2025-03-11 RX ADMIN — PANTOPRAZOLE SODIUM 40 MG: 40 TABLET, DELAYED RELEASE ORAL at 08:18

## 2025-03-11 RX ADMIN — APIXABAN 5 MG: 5 TABLET, FILM COATED ORAL at 20:48

## 2025-03-11 RX ADMIN — HYDRALAZINE HYDROCHLORIDE 25 MG: 25 TABLET ORAL at 08:18

## 2025-03-11 RX ADMIN — CARVEDILOL 25 MG: 25 TABLET, FILM COATED ORAL at 08:17

## 2025-03-11 RX ADMIN — THERA TABS 1 TABLET: TAB at 08:18

## 2025-03-11 RX ADMIN — SERTRALINE HYDROCHLORIDE 25 MG: 50 TABLET, FILM COATED ORAL at 08:17

## 2025-03-11 RX ADMIN — Medication 10 ML: at 20:48

## 2025-03-11 RX ADMIN — CEFTAROLINE FOSAMIL 600 MG: 600 POWDER, FOR SOLUTION INTRAVENOUS at 02:30

## 2025-03-11 RX ADMIN — OXYCODONE 10 MG: 5 TABLET ORAL at 08:18

## 2025-03-11 RX ADMIN — ONDANSETRON 4 MG: 2 INJECTION, SOLUTION INTRAMUSCULAR; INTRAVENOUS at 15:33

## 2025-03-11 RX ADMIN — HYDRALAZINE HYDROCHLORIDE 50 MG: 25 TABLET ORAL at 20:48

## 2025-03-11 RX ADMIN — Medication 10 ML: at 08:19

## 2025-03-11 RX ADMIN — CLINDAMYCIN PHOSPHATE 900 MG: 900 INJECTION, SOLUTION INTRAVENOUS at 20:48

## 2025-03-11 RX ADMIN — CLINDAMYCIN PHOSPHATE 900 MG: 900 INJECTION, SOLUTION INTRAVENOUS at 04:24

## 2025-03-11 RX ADMIN — PANTOPRAZOLE SODIUM 40 MG: 40 TABLET, DELAYED RELEASE ORAL at 20:48

## 2025-03-11 NOTE — PROGRESS NOTES
Infectious Diseases Progress Note      LOS: 2 days   Patient Care Team:  Provider, No Known as PCP - Penny Worrell APRN as Nurse Practitioner (Nurse Practitioner)  Sameera Bethea RD as Dietitian (Nutrition)    Chief Complaint: Right leg redness and drainage    Subjective       The patient has been afebrile for the last 24 hours.  The patient is on room air, hemodynamically stable, and is tolerating antimicrobial therapy.  No new complaints      Review of Systems:   Review of Systems   Constitutional: Negative.    HENT: Negative.     Eyes: Negative.    Respiratory: Negative.     Cardiovascular:  Positive for leg swelling.   Gastrointestinal: Negative.    Endocrine: Negative.    Genitourinary: Negative.    Musculoskeletal: Negative.    Skin:  Positive for color change and wound.   Neurological: Negative.    Psychiatric/Behavioral: Negative.     All other systems reviewed and are negative.       Objective     Vital Signs  Temp:  [97.4 °F (36.3 °C)-98.4 °F (36.9 °C)] 97.4 °F (36.3 °C)  Heart Rate:  [69-80] 69  Resp:  [14-20] 16  BP: (136-158)/() 158/103    Physical Exam:  Physical Exam  Vitals and nursing note reviewed.   Constitutional:       General: He is not in acute distress.     Appearance: He is well-developed. He is obese. He is not diaphoretic.   HENT:      Head: Normocephalic and atraumatic.   Eyes:      Conjunctiva/sclera: Conjunctivae normal.      Pupils: Pupils are equal, round, and reactive to light.   Cardiovascular:      Rate and Rhythm: Normal rate and regular rhythm.      Heart sounds: Normal heart sounds, S1 normal and S2 normal.   Pulmonary:      Effort: Pulmonary effort is normal. No respiratory distress.      Breath sounds: Normal breath sounds. No stridor. No wheezing or rales.   Abdominal:      General: Bowel sounds are normal. There is no distension.      Palpations: Abdomen is soft. There is no mass.      Tenderness: There is no abdominal tenderness. There is no  guarding.   Musculoskeletal:         General: No deformity. Normal range of motion.      Cervical back: Neck supple.      Right lower leg: Edema present.      Left lower leg: Edema present.   Skin:     General: Skin is warm and dry.      Coloration: Skin is not pale.      Findings: No erythema or rash.      Comments: Chronic bilateral venous stasis changes     Superficial wound on the anterior right lower leg with minimal drainage.  No significant streaking above the right knee today     Neurological:      Mental Status: He is alert and oriented to person, place, and time.      Cranial Nerves: No cranial nerve deficit.   Psychiatric:         Mood and Affect: Mood normal.          Results Review:    I have reviewed all clinical data, test, lab, and imaging results.     Radiology  No Radiology Exams Resulted Within Past 24 Hours    Cardiology    Laboratory    Results from last 7 days   Lab Units 03/11/25  0014 03/10/25  0937 03/09/25  1140   WBC 10*3/mm3 8.63 8.28 8.49   HEMOGLOBIN g/dL 13.2 13.6 13.5   HEMATOCRIT % 42.3 43.6 44.0   PLATELETS 10*3/mm3 183 182 181     Results from last 7 days   Lab Units 03/11/25  0014 03/09/25  1140   SODIUM mmol/L 134* 137   POTASSIUM mmol/L 3.8 4.5   CHLORIDE mmol/L 97* 103   CO2 mmol/L 27.5 24.9   BUN mg/dL 12 9   CREATININE mg/dL 0.91 0.65*   GLUCOSE mg/dL 139* 102*   ALBUMIN g/dL 3.7  --    BILIRUBIN mg/dL 0.5  --    ALK PHOS U/L 82  --    AST (SGOT) U/L 27  --    ALT (SGPT) U/L 30  --    CALCIUM mg/dL 8.9 8.3*                 Microbiology   Microbiology Results (last 10 days)       Procedure Component Value - Date/Time    Blood Culture - Blood, Arm, Right [[id number]]  (Normal) Collected: 03/10/25 1315    Lab Status: Preliminary result Specimen: Blood from Arm, Right Updated: 03/11/25 1330     Blood Culture No growth at 24 hours    Wound Culture - Wound, Leg, Right [[id number]]  (Abnormal) Collected: 03/09/25 1012    Lab Status: Preliminary result Specimen: Wound from Leg, Right  Updated: 03/11/25 1022     Wound Culture Heavy growth (4+) Staphylococcus aureus, MRSA     Comment:   Methicillin resistant Staphylococcus aureus, Patient may be an isolation risk.         Heavy growth (4+) Gram Positive Cocci      Heavy growth (4+) Normal Skin Barbara     Gram Stain Many (4+) WBCs per low power field      Many (4+) Gram positive cocci      Many (4+) Gram negative bacilli            Medication Review:       Schedule Meds  acetaminophen, 1,000 mg, Oral, Once  apixaban, 5 mg, Oral, BID  budesonide-formoterol, 2 puff, Inhalation, BID - RT  carvedilol, 25 mg, Oral, BID With Meals  ceftaroline, 600 mg, Intravenous, Q12H  clindamycin, 900 mg, Intravenous, Q8H  hydrALAZINE, 50 mg, Oral, TID  hydroCHLOROthiazide, 25 mg, Oral, Daily  multivitamin, 1 tablet, Oral, Daily  pantoprazole, 40 mg, Oral, BID  rOPINIRole, 0.25 mg, Oral, Daily  sertraline, 25 mg, Oral, Daily  sodium chloride, 10 mL, Intravenous, Q12H        Infusion Meds       PRN Meds    albuterol sulfate HFA    ondansetron    oxyCODONE    [COMPLETED] Insert Peripheral IV **AND** sodium chloride    sodium chloride        Assessment & Plan       Antimicrobial Therapy   1.  IV Teflaro        2.  IV clindamycin        3.        4.        5.            Assessment     Right lower extremity cellulitis with open superficial wound with minimal drainage.  There is growing MRSA and a gram-positive cocci.  There is minimal streak above the knee.  I am suspecting Staphylococcus aureus or beta-hemolytic Streptococcus as a cause of infection.     Chronic bilateral lower extremities venous stasis changes     Morbid obesity with BMI of 65     Borderline diabetes     Plan     Continue ceftaroline 600 mg IV every 12 hours  Continue clindamycin 900 mg every 8 hours  Can de-escalate to p.o. antibiotics when patient improved  Blood cultures x 2 sets-pending   Keep right leg elevated above heart level is much as possible  Continued wound care  Continue supportive care  A.m.  labs      Toña Benavidez, APRN  03/11/25  15:10 EDT    Note is dictated utilizing voice recognition software/Dragon

## 2025-03-11 NOTE — PLAN OF CARE
Goal Outcome Evaluation:              Outcome Evaluation: Pt admitted to floor.  Midline draws blood and labs taken.  Will continue to monitor.

## 2025-03-11 NOTE — PROGRESS NOTES
[facility] MEDICINE SERVICE  DAILY PROGRESS NOTE    NAME: Farhat Hein  : 1980  MRN: [id number]      LOS: 2 days     PROVIDER OF SERVICE: Maritza Krause MD    Chief Complaint: Cellulitis    Subjective:     Interval History:    Patient seen and evaluated at bedside. Reports right leg pain improved. Bilateral lower extremities wrapped by wound care. Tolerating IV antibiotics.     Treatment plan discussed with patient. All questions addressed.     Review of Systems:   Denies fevers, chills  Denies chest pain, +edema  Denies shortness of breath, cough  Denies nausea, vomiting, diarrhea  Denies dysuria, hematuria  +Bilateral lower extremity wounds and pain, right greater than left    Objective:     Vital Signs  Temp:  [97.4 °F (36.3 °C)-98.4 °F (36.9 °C)] 97.4 °F (36.3 °C)  Heart Rate:  [67-80] 69  Resp:  [14-20] 16  BP: (122-158)/() 158/103   Body mass index is 64.85 kg/m².    Physical Exam   General: No acute distress, alert and oriented, obese  CV: RRR, + peripheral edema  Pulm: CTAB, no increased work of breathing  Abd: Soft, nontender, nondistended  Skin: Bilateral lower extremity wounds with chronic venous stasis changes  Psych: Appropriate mood and affect    Scheduled Meds   acetaminophen, 1,000 mg, Oral, Once  apixaban, 5 mg, Oral, BID  budesonide-formoterol, 2 puff, Inhalation, BID - RT  carvedilol, 25 mg, Oral, BID With Meals  ceftaroline, 600 mg, Intravenous, Q12H  clindamycin, 900 mg, Intravenous, Q8H  hydrALAZINE, 25 mg, Oral, TID  hydroCHLOROthiazide, 25 mg, Oral, Daily  multivitamin, 1 tablet, Oral, Daily  pantoprazole, 40 mg, Oral, BID  rOPINIRole, 0.25 mg, Oral, Daily  sertraline, 25 mg, Oral, Daily  sodium chloride, 10 mL, Intravenous, Q12H       PRN Meds     albuterol sulfate HFA    oxyCODONE    [COMPLETED] Insert Peripheral IV **AND** sodium chloride    sodium chloride   Infusions         Diagnostic Data    Results from last 7 days   Lab Units 25  0014   WBC 10*3/mm3 8.63    HEMOGLOBIN g/dL 13.2   HEMATOCRIT % 42.3   PLATELETS 10*3/mm3 183   GLUCOSE mg/dL 139*   CREATININE mg/dL 0.91   BUN mg/dL 12   SODIUM mmol/L 134*   POTASSIUM mmol/L 3.8   AST (SGOT) U/L 27   ALT (SGPT) U/L 30   ALK PHOS U/L 82   BILIRUBIN mg/dL 0.5   ANION GAP mmol/L 9.5       No radiology results for the last day    Interval results reviewed.    Assessment/Plan:     Right lower extremity cellulitis, recurrent  Chronic venous insufficiency  - Failed outpatient therapy  - ID consulted, appreciate recs  - Continue IV ceftaroline, clindamycin; plans to de-escalate when clinically improves  - Blood cultures pending  - Wound care consulted, appreciate assistance    Restless leg syndrome  History of DVT  Factor V Leiden  Hypertension  - Resume home medications as appropriate    VTE Prophylaxis:  Pharmacologic & mechanical VTE prophylaxis orders are present.    Code status is   Code Status and Medical Interventions: CPR (Attempt to Resuscitate); Full Support   Ordered at: 03/09/25 1257     Code Status (Patient has no pulse and is not breathing):    CPR (Attempt to Resuscitate)     Medical Interventions (Patient has pulse or is breathing):    Full Support       Plan for disposition: Home 3/13/25    Barriers to discharge: IV antibiotics, cultures pending    Time: 35+ minutes     Signature: Electronically signed by Maritza Krause MD, 03/11/25, 09:01 EDT.  [facility] Hospitalist Team

## 2025-03-11 NOTE — PLAN OF CARE
Problem: Adult Inpatient Plan of Care  Goal: Plan of Care Review  Outcome: Progressing  Flowsheets (Taken 3/11/2025 1811)  Progress: no change  Plan of Care Reviewed With: patient  Goal: Patient-Specific Goal (Individualized)  Outcome: Progressing  Goal: Absence of Hospital-Acquired Illness or Injury  Outcome: Progressing  Intervention: Identify and Manage Fall Risk  Recent Flowsheet Documentation  Taken 3/11/2025 1400 by Chely Mccord RN  Safety Promotion/Fall Prevention: safety round/check completed  Taken 3/11/2025 1200 by Chely Mccord RN  Safety Promotion/Fall Prevention: safety round/check completed  Taken 3/11/2025 1000 by Chely Mccord RN  Safety Promotion/Fall Prevention: safety round/check completed  Taken 3/11/2025 0817 by Chely Mccord RN  Safety Promotion/Fall Prevention: safety round/check completed  Intervention: Prevent Skin Injury  Recent Flowsheet Documentation  Taken 3/11/2025 0817 by Chely Mccord RN  Body Position: position changed independently  Goal: Optimal Comfort and Wellbeing  Outcome: Progressing  Intervention: Provide Person-Centered Care  Recent Flowsheet Documentation  Taken 3/11/2025 0817 by Chely Mccord RN  Trust Relationship/Rapport:   care explained   questions answered  Taken 3/11/2025 0800 by Chely Mccord RN  Trust Relationship/Rapport:   care explained   questions answered  Goal: Readiness for Transition of Care  Outcome: Progressing     Problem: Infection  Goal: Absence of Infection Signs and Symptoms  Outcome: Progressing     Problem: Pain Acute  Goal: Optimal Pain Control and Function  Outcome: Progressing  Intervention: Optimize Psychosocial Wellbeing  Recent Flowsheet Documentation  Taken 3/11/2025 0817 by Chely Mccord RN  Diversional Activities:   television   smartphone  Taken 3/11/2025 0800 by Chely Mccord RN  Diversional Activities:   television   smartphone     Problem: Skin Injury Risk Increased  Goal: Skin Health and  Integrity  Outcome: Progressing  Intervention: Optimize Skin Protection  Recent Flowsheet Documentation  Taken 3/11/2025 0817 by Chely Mccord, RN  Activity Management: up ad yanelis     Problem: Fall Injury Risk  Goal: Absence of Fall and Fall-Related Injury  Outcome: Progressing  Intervention: Promote Injury-Free Environment  Recent Flowsheet Documentation  Taken 3/11/2025 1400 by Chely Mccord, RN  Safety Promotion/Fall Prevention: safety round/check completed  Taken 3/11/2025 1200 by Chely Mccord, RN  Safety Promotion/Fall Prevention: safety round/check completed  Taken 3/11/2025 1000 by Chely Mccord, RN  Safety Promotion/Fall Prevention: safety round/check completed  Taken 3/11/2025 0817 by Chely Mccord, RN  Safety Promotion/Fall Prevention: safety round/check completed   Goal Outcome Evaluation:  Plan of Care Reviewed With: patient        Progress: no change

## 2025-03-12 LAB
ANION GAP SERPL CALCULATED.3IONS-SCNC: 11.9 MMOL/L (ref 5–15)
BACTERIA SPEC AEROBE CULT: ABNORMAL
BUN SERPL-MCNC: 13 MG/DL (ref 6–20)
BUN/CREAT SERPL: 15.7 (ref 7–25)
CALCIUM SPEC-SCNC: 8.1 MG/DL (ref 8.6–10.5)
CHLORIDE SERPL-SCNC: 100 MMOL/L (ref 98–107)
CO2 SERPL-SCNC: 27.1 MMOL/L (ref 22–29)
CREAT SERPL-MCNC: 0.83 MG/DL (ref 0.76–1.27)
DEPRECATED RDW RBC AUTO: 48.4 FL (ref 37–54)
EGFRCR SERPLBLD CKD-EPI 2021: 109.3 ML/MIN/1.73
ERYTHROCYTE [DISTWIDTH] IN BLOOD BY AUTOMATED COUNT: 14.3 % (ref 12.3–15.4)
GLUCOSE SERPL-MCNC: 131 MG/DL (ref 65–99)
GRAM STN SPEC: ABNORMAL
HCT VFR BLD AUTO: 42.3 % (ref 37.5–51)
HGB BLD-MCNC: 13.3 G/DL (ref 13–17.7)
MCH RBC QN AUTO: 29.2 PG (ref 26.6–33)
MCHC RBC AUTO-ENTMCNC: 31.4 G/DL (ref 31.5–35.7)
MCV RBC AUTO: 92.8 FL (ref 79–97)
PHOSPHATE SERPL-MCNC: 3.4 MG/DL (ref 2.5–4.5)
PLATELET # BLD AUTO: 187 10*3/MM3 (ref 140–450)
PMV BLD AUTO: 10.9 FL (ref 6–12)
POTASSIUM SERPL-SCNC: 4.1 MMOL/L (ref 3.5–5.2)
RBC # BLD AUTO: 4.56 10*6/MM3 (ref 4.14–5.8)
SODIUM SERPL-SCNC: 139 MMOL/L (ref 136–145)
WBC NRBC COR # BLD AUTO: 7.8 10*3/MM3 (ref 3.4–10.8)

## 2025-03-12 PROCEDURE — 80048 BASIC METABOLIC PNL TOTAL CA: CPT | Performed by: STUDENT IN AN ORGANIZED HEALTH CARE EDUCATION/TRAINING PROGRAM

## 2025-03-12 PROCEDURE — 25010000002 CLINDAMYCIN PER 300 MG: Performed by: INTERNAL MEDICINE

## 2025-03-12 PROCEDURE — 94664 DEMO&/EVAL PT USE INHALER: CPT

## 2025-03-12 PROCEDURE — 25010000002 CEFTAROLINE FOSAMIL PER 10 MG: Performed by: INTERNAL MEDICINE

## 2025-03-12 PROCEDURE — 84100 ASSAY OF PHOSPHORUS: CPT | Performed by: HOSPITALIST

## 2025-03-12 PROCEDURE — 85027 COMPLETE CBC AUTOMATED: CPT | Performed by: STUDENT IN AN ORGANIZED HEALTH CARE EDUCATION/TRAINING PROGRAM

## 2025-03-12 PROCEDURE — 94799 UNLISTED PULMONARY SVC/PX: CPT

## 2025-03-12 PROCEDURE — 94761 N-INVAS EAR/PLS OXIMETRY MLT: CPT

## 2025-03-12 RX ORDER — OXYCODONE HYDROCHLORIDE 5 MG/1
10 TABLET ORAL EVERY 6 HOURS PRN
Status: DISCONTINUED | OUTPATIENT
Start: 2025-03-12 | End: 2025-03-13 | Stop reason: HOSPADM

## 2025-03-12 RX ORDER — DOXYCYCLINE 100 MG/1
100 CAPSULE ORAL EVERY 12 HOURS SCHEDULED
Status: DISCONTINUED | OUTPATIENT
Start: 2025-03-12 | End: 2025-03-13 | Stop reason: HOSPADM

## 2025-03-12 RX ADMIN — Medication 10 ML: at 09:18

## 2025-03-12 RX ADMIN — THERA TABS 1 TABLET: TAB at 09:18

## 2025-03-12 RX ADMIN — SERTRALINE HYDROCHLORIDE 25 MG: 50 TABLET, FILM COATED ORAL at 09:18

## 2025-03-12 RX ADMIN — OXYCODONE 10 MG: 5 TABLET ORAL at 09:23

## 2025-03-12 RX ADMIN — CEFTAROLINE FOSAMIL 600 MG: 600 POWDER, FOR SOLUTION INTRAVENOUS at 02:14

## 2025-03-12 RX ADMIN — CLINDAMYCIN PHOSPHATE 900 MG: 900 INJECTION, SOLUTION INTRAVENOUS at 05:15

## 2025-03-12 RX ADMIN — AMOXICILLIN AND CLAVULANATE POTASSIUM 1 TABLET: 875; 125 TABLET, FILM COATED ORAL at 20:24

## 2025-03-12 RX ADMIN — DOXYCYCLINE 100 MG: 100 CAPSULE ORAL at 20:24

## 2025-03-12 RX ADMIN — PANTOPRAZOLE SODIUM 40 MG: 40 TABLET, DELAYED RELEASE ORAL at 09:18

## 2025-03-12 RX ADMIN — BUDESONIDE AND FORMOTEROL FUMARATE DIHYDRATE 2 PUFF: 160; 4.5 AEROSOL RESPIRATORY (INHALATION) at 07:10

## 2025-03-12 RX ADMIN — OXYCODONE 10 MG: 5 TABLET ORAL at 01:27

## 2025-03-12 RX ADMIN — APIXABAN 5 MG: 5 TABLET, FILM COATED ORAL at 09:18

## 2025-03-12 RX ADMIN — CLINDAMYCIN PHOSPHATE 900 MG: 900 INJECTION, SOLUTION INTRAVENOUS at 14:00

## 2025-03-12 RX ADMIN — CARVEDILOL 25 MG: 25 TABLET, FILM COATED ORAL at 17:24

## 2025-03-12 RX ADMIN — HYDRALAZINE HYDROCHLORIDE 50 MG: 25 TABLET ORAL at 17:24

## 2025-03-12 RX ADMIN — APIXABAN 5 MG: 5 TABLET, FILM COATED ORAL at 20:24

## 2025-03-12 RX ADMIN — HYDRALAZINE HYDROCHLORIDE 50 MG: 25 TABLET ORAL at 20:25

## 2025-03-12 RX ADMIN — CARVEDILOL 25 MG: 25 TABLET, FILM COATED ORAL at 09:18

## 2025-03-12 RX ADMIN — PANTOPRAZOLE SODIUM 40 MG: 40 TABLET, DELAYED RELEASE ORAL at 20:24

## 2025-03-12 RX ADMIN — HYDRALAZINE HYDROCHLORIDE 50 MG: 25 TABLET ORAL at 09:18

## 2025-03-12 RX ADMIN — CEFTAROLINE FOSAMIL 600 MG: 600 POWDER, FOR SOLUTION INTRAVENOUS at 14:37

## 2025-03-12 RX ADMIN — OXYCODONE 10 MG: 5 TABLET ORAL at 20:33

## 2025-03-12 RX ADMIN — Medication 10 ML: at 20:25

## 2025-03-12 RX ADMIN — ROPINIROLE HYDROCHLORIDE 0.25 MG: 0.25 TABLET, FILM COATED ORAL at 09:16

## 2025-03-12 RX ADMIN — BUDESONIDE AND FORMOTEROL FUMARATE DIHYDRATE 2 PUFF: 160; 4.5 AEROSOL RESPIRATORY (INHALATION) at 20:46

## 2025-03-12 RX ADMIN — HYDROCHLOROTHIAZIDE 25 MG: 25 TABLET ORAL at 09:16

## 2025-03-12 NOTE — CASE MANAGEMENT/SOCIAL WORK
Continued Stay Note  TIFFANIE Haynes     Patient Name: Farhat Hein  MRN: [id number]  Today's Date: 3/12/2025    Admit Date: 3/9/2025    Plan: Routine home. Patient will provide his own DC transport.   Discharge Plan       Row Name 03/12/25 0850       Plan    Plan Routine home. Patient will provide his own DC transport.    Plan Comments DC barriers: ID following, continue IV teflaro and cleocin, will be able to DC home on PO abx, continue wound care and elevate LE's, am labs, blood cultures pending.           Cameron Reyes RN     Cell number [phone]  Office number [phone]

## 2025-03-12 NOTE — PLAN OF CARE
Goal Outcome Evaluation:              Outcome Evaluation: Pt continues with iv atb and pain medication.  No other issues this shift.  Will continue to monitor.

## 2025-03-12 NOTE — PROGRESS NOTES
[facility] MEDICINE SERVICE  DAILY PROGRESS NOTE    NAME: Farhat Hein  : 1980  MRN: [id number]      LOS: 3 days     PROVIDER OF SERVICE: Maritza Krause MD    Chief Complaint: Cellulitis    Subjective:     Interval History:    Patient seen and evaluated at bedside. Reports persistent but improved pain in right lower extremity. Had some nausea yesterday that resolved with zofran. ID continues to follow.     Treatment plan discussed with patient. All questions addressed.     Review of Systems:   Denies fevers, chills  Denies chest pain, +edema  Denies shortness of breath, cough  Denies nausea, vomiting, diarrhea  Denies dysuria, hematuria  +Bilateral lower extremity wounds and pain, right greater than left    Objective:     Vital Signs  Temp:  [97.6 °F (36.4 °C)-98 °F (36.7 °C)] 98 °F (36.7 °C)  Heart Rate:  [68-78] 78  Resp:  [15-18] 16  BP: (132-165)/(77-96) 149/95   Body mass index is 64.85 kg/m².    Physical Exam   General: No acute distress, alert and oriented, obese  CV: RRR, + peripheral edema  Pulm: CTAB, no increased work of breathing  Abd: Soft, nontender, nondistended  Skin: Bilateral lower extremity wounds with chronic venous stasis changes; legs wrapped   Psych: Appropriate mood and affect    Scheduled Meds   acetaminophen, 1,000 mg, Oral, Once  apixaban, 5 mg, Oral, BID  budesonide-formoterol, 2 puff, Inhalation, BID - RT  carvedilol, 25 mg, Oral, BID With Meals  ceftaroline, 600 mg, Intravenous, Q12H  clindamycin, 900 mg, Intravenous, Q8H  hydrALAZINE, 50 mg, Oral, TID  hydroCHLOROthiazide, 25 mg, Oral, Daily  multivitamin, 1 tablet, Oral, Daily  pantoprazole, 40 mg, Oral, BID  rOPINIRole, 0.25 mg, Oral, Daily  sertraline, 25 mg, Oral, Daily  sodium chloride, 10 mL, Intravenous, Q12H       PRN Meds     albuterol sulfate HFA    ondansetron    oxyCODONE    [COMPLETED] Insert Peripheral IV **AND** sodium chloride    sodium chloride   Infusions         Diagnostic Data    Results from last  7 days   Lab Units 03/12/25  0221 03/11/25  0014   WBC 10*3/mm3 7.80 8.63   HEMOGLOBIN g/dL 13.3 13.2   HEMATOCRIT % 42.3 42.3   PLATELETS 10*3/mm3 187 183   GLUCOSE mg/dL 131* 139*   CREATININE mg/dL 0.83 0.91   BUN mg/dL 13 12   SODIUM mmol/L 139 134*   POTASSIUM mmol/L 4.1 3.8   AST (SGOT) U/L  --  27   ALT (SGPT) U/L  --  30   ALK PHOS U/L  --  82   BILIRUBIN mg/dL  --  0.5   ANION GAP mmol/L 11.9 9.5       No radiology results for the last day    Interval results reviewed.    Assessment/Plan:     Right lower extremity cellulitis, recurrent  Chronic venous insufficiency  - Failed outpatient therapy  - ID consulted, appreciate recs  - Continue IV ceftaroline, clindamycin; plans to de-escalate when clinically improves  - Blood cultures pending; wound cultures growing MRSA and enterococcus spp.  - Wound care consulted, appreciate assistance    Restless leg syndrome  History of DVT  Factor V Leiden  Hypertension  - Resume home medications as appropriate    VTE Prophylaxis:  Pharmacologic & mechanical VTE prophylaxis orders are present.    Code status is   Code Status and Medical Interventions: CPR (Attempt to Resuscitate); Full Support   Ordered at: 03/09/25 1257     Code Status (Patient has no pulse and is not breathing):    CPR (Attempt to Resuscitate)     Medical Interventions (Patient has pulse or is breathing):    Full Support       Plan for disposition: Home 3/13/25    Barriers to discharge: IV antibiotics, cultures pending    Time: 35+ minutes     Signature: Electronically signed by Maritza Krause MD, 03/12/25, 08:42 EDT.  [facility] Hospitalist Team

## 2025-03-12 NOTE — PLAN OF CARE
Goal Outcome Evaluation:      Patient Aox4. Patient refuses chair/bed alarm. Patient ambulating to bathroom independently. Plan to start PO antibiotics. Bilateral dressing on lower extremity changed today. Patient experiences pain in lower extremities. Has rested in chair throughout the day.

## 2025-03-12 NOTE — PROGRESS NOTES
Infectious Diseases Progress Note      LOS: 3 days   Patient Care Team:  Provider, No Known as PCP - Penny Worrell APRN as Nurse Practitioner (Nurse Practitioner)  Sameera Bethea RD as Dietitian (Nutrition)    Chief Complaint: Right leg redness and drainage    Subjective       The patient has been afebrile for the last 24 hours.  The patient is on room air, hemodynamically stable, and is tolerating antimicrobial therapy.  No new complaints      Review of Systems:   Review of Systems   Constitutional: Negative.    HENT: Negative.     Eyes: Negative.    Respiratory: Negative.     Cardiovascular:  Positive for leg swelling.   Gastrointestinal: Negative.    Endocrine: Negative.    Genitourinary: Negative.    Musculoskeletal: Negative.    Skin:  Positive for color change and wound.   Neurological: Negative.    Psychiatric/Behavioral: Negative.     All other systems reviewed and are negative.       Objective     Vital Signs  Temp:  [97.6 °F (36.4 °C)-98 °F (36.7 °C)] 98 °F (36.7 °C)  Heart Rate:  [68-81] 81  Resp:  [15-20] 16  BP: (132-165)/(75-96) 136/75    Physical Exam:  Physical Exam  Vitals and nursing note reviewed.   Constitutional:       General: He is not in acute distress.     Appearance: He is well-developed. He is obese. He is not diaphoretic.   HENT:      Head: Normocephalic and atraumatic.   Eyes:      Conjunctiva/sclera: Conjunctivae normal.      Pupils: Pupils are equal, round, and reactive to light.   Cardiovascular:      Rate and Rhythm: Normal rate and regular rhythm.      Heart sounds: Normal heart sounds, S1 normal and S2 normal.   Pulmonary:      Effort: Pulmonary effort is normal. No respiratory distress.      Breath sounds: Normal breath sounds. No stridor. No wheezing or rales.   Abdominal:      General: Bowel sounds are normal. There is no distension.      Palpations: Abdomen is soft. There is no mass.      Tenderness: There is no abdominal tenderness. There is no guarding.    Musculoskeletal:         General: No deformity. Normal range of motion.      Cervical back: Neck supple.      Right lower leg: Edema present.      Left lower leg: Edema present.   Skin:     General: Skin is warm and dry.      Coloration: Skin is not pale.      Findings: No erythema or rash.      Comments: Chronic bilateral venous stasis changes     Superficial wound on the anterior right lower leg with minimal drainage.  No significant streaking above the right knee today     Neurological:      Mental Status: He is alert and oriented to person, place, and time.      Cranial Nerves: No cranial nerve deficit.   Psychiatric:         Mood and Affect: Mood normal.          Results Review:    I have reviewed all clinical data, test, lab, and imaging results.     Radiology  No Radiology Exams Resulted Within Past 24 Hours    Cardiology    Laboratory    Results from last 7 days   Lab Units 03/12/25  0221 03/11/25  0014 03/10/25  0937 03/09/25  1140   WBC 10*3/mm3 7.80 8.63 8.28 8.49   HEMOGLOBIN g/dL 13.3 13.2 13.6 13.5   HEMATOCRIT % 42.3 42.3 43.6 44.0   PLATELETS 10*3/mm3 187 183 182 181     Results from last 7 days   Lab Units 03/12/25  0221 03/11/25  0014 03/09/25  1140   SODIUM mmol/L 139 134* 137   POTASSIUM mmol/L 4.1 3.8 4.5   CHLORIDE mmol/L 100 97* 103   CO2 mmol/L 27.1 27.5 24.9   BUN mg/dL 13 12 9   CREATININE mg/dL 0.83 0.91 0.65*   GLUCOSE mg/dL 131* 139* 102*   ALBUMIN g/dL  --  3.7  --    BILIRUBIN mg/dL  --  0.5  --    ALK PHOS U/L  --  82  --    AST (SGOT) U/L  --  27  --    ALT (SGPT) U/L  --  30  --    CALCIUM mg/dL 8.1* 8.9 8.3*                 Microbiology   Microbiology Results (last 10 days)       Procedure Component Value - Date/Time    Blood Culture - Blood, Arm, Left [[id number]]  (Normal) Collected: 03/10/25 1508    Lab Status: Preliminary result Specimen: Blood from Arm, Left Updated: 03/11/25 1515     Blood Culture No growth at 24 hours    Blood Culture - Blood, Arm, Right [[id number]]   (Normal) Collected: 03/10/25 1315    Lab Status: Preliminary result Specimen: Blood from Arm, Right Updated: 03/12/25 1330     Blood Culture No growth at 2 days    Wound Culture - Wound, Leg, Right [[id number]]  (Abnormal)  (Susceptibility) Collected: 03/09/25 1012    Lab Status: Final result Specimen: Wound from Leg, Right Updated: 03/12/25 0732     Wound Culture Heavy growth (4+) Staphylococcus aureus, MRSA     Comment:   Methicillin resistant Staphylococcus aureus, Patient may be an isolation risk.         Heavy growth (4+) Enterococcus faecalis      Heavy growth (4+) Normal Skin Barbara     Gram Stain Many (4+) WBCs per low power field      Many (4+) Gram positive cocci      Many (4+) Gram negative bacilli    Susceptibility        Staphylococcus aureus, MRSA      QUINN      Clindamycin Resistant      Erythromycin Resistant      Oxacillin Resistant      Rifampin Susceptible      Tetracycline Susceptible      Trimethoprim + Sulfamethoxazole Susceptible      Vancomycin Susceptible                       Susceptibility        Enterococcus faecalis      QUINN      Ampicillin Susceptible      Vancomycin Susceptible                       Susceptibility Comments       Staphylococcus aureus, MRSA    This isolate is presumed to be clindamycin resistant based on detection of inducible clindamycin resistance.  Clindamycin may still be effective in some patients.                       Medication Review:       Schedule Meds  acetaminophen, 1,000 mg, Oral, Once  apixaban, 5 mg, Oral, BID  budesonide-formoterol, 2 puff, Inhalation, BID - RT  carvedilol, 25 mg, Oral, BID With Meals  ceftaroline, 600 mg, Intravenous, Q12H  clindamycin, 900 mg, Intravenous, Q8H  hydrALAZINE, 50 mg, Oral, TID  hydroCHLOROthiazide, 25 mg, Oral, Daily  multivitamin, 1 tablet, Oral, Daily  pantoprazole, 40 mg, Oral, BID  rOPINIRole, 0.25 mg, Oral, Daily  sertraline, 25 mg, Oral, Daily  sodium chloride, 10 mL, Intravenous, Q12H        Infusion Meds       PRN  Meds    albuterol sulfate HFA    ondansetron    oxyCODONE    [COMPLETED] Insert Peripheral IV **AND** sodium chloride    sodium chloride        Assessment & Plan       Antimicrobial Therapy   1.  IV Teflaro        2.  IV clindamycin        3.        4.        5.            Assessment     Right lower extremity cellulitis with open superficial wound with minimal drainage.  There is growing MRSA and a gram-positive cocci.  There is minimal streak above the knee.  I am suspecting Staphylococcus aureus or beta-hemolytic Streptococcus as a cause of infection.  Improvement to the wound erythema and warmth today     Chronic bilateral lower extremities venous stasis changes     Morbid obesity with BMI of 65     Borderline diabetes     Plan     Discontinue ceftaroline and clindamycin   Start p.o. doxycycline 100 mg twice daily for 10 days  Start p.o. Augmentin 875/125 mg twice daily for 10 days  Keep right leg elevated above heart level is much as possible  Continued wound care  Continue supportive care  Okay to discharge from Infectious Disease standpoint  Not much more to add from infectious disease standpoint-we will sign off at this time-please call with any questions.  Case discussed with patient and family member on the phone    SAMI Dietrich  03/12/25  15:12 EDT    Note is dictated utilizing voice recognition software/Dragon

## 2025-03-13 ENCOUNTER — READMISSION MANAGEMENT (OUTPATIENT)
Dept: CALL CENTER | Facility: HOSPITAL | Age: 45
End: 2025-03-13
Payer: MEDICARE

## 2025-03-13 VITALS
HEIGHT: 71 IN | SYSTOLIC BLOOD PRESSURE: 150 MMHG | BODY MASS INDEX: 44.1 KG/M2 | OXYGEN SATURATION: 92 % | HEART RATE: 72 BPM | WEIGHT: 315 LBS | DIASTOLIC BLOOD PRESSURE: 95 MMHG | TEMPERATURE: 97.6 F | RESPIRATION RATE: 18 BRPM

## 2025-03-13 LAB
ANION GAP SERPL CALCULATED.3IONS-SCNC: 8.5 MMOL/L (ref 5–15)
BUN SERPL-MCNC: 12 MG/DL (ref 6–20)
BUN/CREAT SERPL: 13 (ref 7–25)
CALCIUM SPEC-SCNC: 8.8 MG/DL (ref 8.6–10.5)
CHLORIDE SERPL-SCNC: 99 MMOL/L (ref 98–107)
CO2 SERPL-SCNC: 29.5 MMOL/L (ref 22–29)
CREAT SERPL-MCNC: 0.92 MG/DL (ref 0.76–1.27)
DEPRECATED RDW RBC AUTO: 49.1 FL (ref 37–54)
EGFRCR SERPLBLD CKD-EPI 2021: 103.9 ML/MIN/1.73
ERYTHROCYTE [DISTWIDTH] IN BLOOD BY AUTOMATED COUNT: 14.4 % (ref 12.3–15.4)
GLUCOSE SERPL-MCNC: 170 MG/DL (ref 65–99)
HCT VFR BLD AUTO: 45.4 % (ref 37.5–51)
HGB BLD-MCNC: 13.8 G/DL (ref 13–17.7)
MCH RBC QN AUTO: 28.3 PG (ref 26.6–33)
MCHC RBC AUTO-ENTMCNC: 30.4 G/DL (ref 31.5–35.7)
MCV RBC AUTO: 93.2 FL (ref 79–97)
PHOSPHATE SERPL-MCNC: 3.2 MG/DL (ref 2.5–4.5)
PLATELET # BLD AUTO: 195 10*3/MM3 (ref 140–450)
PMV BLD AUTO: 10.6 FL (ref 6–12)
POTASSIUM SERPL-SCNC: 4 MMOL/L (ref 3.5–5.2)
RBC # BLD AUTO: 4.87 10*6/MM3 (ref 4.14–5.8)
SODIUM SERPL-SCNC: 137 MMOL/L (ref 136–145)
WBC NRBC COR # BLD AUTO: 7.62 10*3/MM3 (ref 3.4–10.8)

## 2025-03-13 PROCEDURE — 94799 UNLISTED PULMONARY SVC/PX: CPT

## 2025-03-13 PROCEDURE — 80048 BASIC METABOLIC PNL TOTAL CA: CPT | Performed by: STUDENT IN AN ORGANIZED HEALTH CARE EDUCATION/TRAINING PROGRAM

## 2025-03-13 PROCEDURE — 84100 ASSAY OF PHOSPHORUS: CPT | Performed by: HOSPITALIST

## 2025-03-13 PROCEDURE — 85027 COMPLETE CBC AUTOMATED: CPT | Performed by: STUDENT IN AN ORGANIZED HEALTH CARE EDUCATION/TRAINING PROGRAM

## 2025-03-13 PROCEDURE — 94664 DEMO&/EVAL PT USE INHALER: CPT

## 2025-03-13 PROCEDURE — 94761 N-INVAS EAR/PLS OXIMETRY MLT: CPT

## 2025-03-13 RX ORDER — OXYCODONE HYDROCHLORIDE 10 MG/1
10 TABLET ORAL EVERY 6 HOURS PRN
Qty: 12 TABLET | Refills: 0 | Status: SHIPPED | OUTPATIENT
Start: 2025-03-13 | End: 2025-03-18

## 2025-03-13 RX ORDER — DOXYCYCLINE 100 MG/1
100 CAPSULE ORAL EVERY 12 HOURS SCHEDULED
Qty: 19 CAPSULE | Refills: 0 | Status: SHIPPED | OUTPATIENT
Start: 2025-03-13 | End: 2025-03-20 | Stop reason: HOSPADM

## 2025-03-13 RX ADMIN — Medication 10 ML: at 09:07

## 2025-03-13 RX ADMIN — HYDRALAZINE HYDROCHLORIDE 50 MG: 25 TABLET ORAL at 09:07

## 2025-03-13 RX ADMIN — HYDROCHLOROTHIAZIDE 25 MG: 25 TABLET ORAL at 09:06

## 2025-03-13 RX ADMIN — OXYCODONE 10 MG: 5 TABLET ORAL at 03:32

## 2025-03-13 RX ADMIN — CARVEDILOL 25 MG: 25 TABLET, FILM COATED ORAL at 09:06

## 2025-03-13 RX ADMIN — APIXABAN 5 MG: 5 TABLET, FILM COATED ORAL at 09:06

## 2025-03-13 RX ADMIN — AMOXICILLIN AND CLAVULANATE POTASSIUM 1 TABLET: 875; 125 TABLET, FILM COATED ORAL at 09:06

## 2025-03-13 RX ADMIN — ROPINIROLE HYDROCHLORIDE 0.25 MG: 0.25 TABLET, FILM COATED ORAL at 09:07

## 2025-03-13 RX ADMIN — SERTRALINE HYDROCHLORIDE 25 MG: 50 TABLET, FILM COATED ORAL at 09:07

## 2025-03-13 RX ADMIN — BUDESONIDE AND FORMOTEROL FUMARATE DIHYDRATE 2 PUFF: 160; 4.5 AEROSOL RESPIRATORY (INHALATION) at 07:25

## 2025-03-13 RX ADMIN — DOXYCYCLINE 100 MG: 100 CAPSULE ORAL at 09:06

## 2025-03-13 RX ADMIN — PANTOPRAZOLE SODIUM 40 MG: 40 TABLET, DELAYED RELEASE ORAL at 09:06

## 2025-03-13 NOTE — CASE MANAGEMENT/SOCIAL WORK
Continued Stay Note  TIFFANIE Haynes     Patient Name: Farhat Hein  MRN: [id number]  Today's Date: 3/13/2025    Admit Date: 3/9/2025    Plan: Routine home. Patient will provide his own DC transport.   Discharge Plan       Row Name 03/13/25 0851       Plan    Plan Comments DC barriers: ID has signed off and changed abx regimen to PO Doxy and Augmentin BID, blood cultures no growth after 2 days, possible DC today.           Cameron Reyes RN      Cell number [phone]  Office number [phone]

## 2025-03-13 NOTE — DISCHARGE SUMMARY
"             [facility] Medicine Services  Discharge Summary    Date of Service: 3/13/25  Patient Name: Farhat Hein  : 1980  MRN: [id number]    Date of Admission: 3/9/2025  Discharge Diagnosis: Right lower extremity cellulitis  Date of Discharge:  3/13/25  Primary Care Physician: Provider, No Known    Presenting Problem:   Cellulitis [L03.90]  Cellulitis of right leg [L03.115]    Active and Resolved Hospital Problems:  Right lower extremity cellulitis, recurrent  Chronic venous insufficiency  Bilateral lower extremity wounds  Restless leg syndrome  History of DVT  Factor V Leiden  Hypertension    Hospital Course     HPI:  Per the H&P written by Bradly Burnett, dated 3/9/25:  \"Farhat Hein is a 45 y.o. male with a CMH of morbid obesity, restless leg syndrome, DVT, factor V Leyden, hypertension, who presented to [facility] on 3/9/2025 with complaint of swelling, pain and drainage from the right lower extremity for more than 2 weeks.  He was diagnosed with cellulitis of right lower extremity 2 weeks ago and was prescribed Keflex and Bactrim which she has taken for 2 weeks with no improvement in the symptoms.  Today he complains of pain in the right lower extremity along with ulcerations and drainage.  He denies any fever, vomiting.  There is no chest pain, abdominal pain, or any new urinary or bowel symptoms\"    Hospital Course:  Patient admitted as above. Started on IV antibiotic therapy. ID and wound care consulted. Noted improvement with treatment. Wound cultures growing MRSA and enterococcus. Transitioned to oral antibiotics with plans for discharge and outpatient follow up.     DISCHARGE Follow Up Recommendations for labs and diagnostics:   - Follow up with PCP within 3 days of discharge  - Ambulatory referral for wound clinic ordered    Day of Discharge     Vital Signs:  Temp:  [97.6 °F (36.4 °C)-97.7 °F (36.5 °C)] 97.6 °F (36.4 °C)  Heart Rate:  [59-81] 72  Resp:  [17-20] 18  BP: " (123-158)/(70-95) 150/95    Physical Exam:   General: No acute distress, alert and oriented, obese  CV: RRR, + peripheral edema  Pulm: CTAB, no increased work of breathing  Abd: Soft, nontender, nondistended  Skin: Bilateral lower extremity wounds with chronic venous stasis changes; legs wrapped   Psych: Appropriate mood and affect    Pertinent  and/or Most Recent Results     LAB RESULTS:      Lab 03/13/25  0148 03/12/25  0221 03/11/25  0014 03/10/25  0937 03/09/25  1140   WBC 7.62 7.80 8.63 8.28 8.49   HEMOGLOBIN 13.8 13.3 13.2 13.6 13.5   HEMATOCRIT 45.4 42.3 42.3 43.6 44.0   PLATELETS 195 187 183 182 181   NEUTROS ABS  --   --  5.67 4.85 5.18   IMMATURE GRANS (ABS)  --   --  0.03 0.03 0.01   LYMPHS ABS  --   --  1.97 2.37 2.30   MONOS ABS  --   --  0.68 0.66 0.66   EOS ABS  --   --  0.22 0.31 0.28   MCV 93.2 92.8 90.4 90.3 92.4         Lab 03/13/25  0148 03/12/25  0221 03/11/25  0014 03/10/25  0937 03/09/25  1140   SODIUM 137 139 134*  --  137   POTASSIUM 4.0 4.1 3.8  --  4.5   CHLORIDE 99 100 97*  --  103   CO2 29.5* 27.1 27.5  --  24.9   ANION GAP 8.5 11.9 9.5  --  9.1   BUN 12 13 12  --  9   CREATININE 0.92 0.83 0.91  --  0.65*   EGFR 103.9 109.3 105.3  --  117.7   GLUCOSE 170* 131* 139*  --  102*   CALCIUM 8.8 8.1* 8.9  --  8.3*   MAGNESIUM  --   --   --  1.7  --    PHOSPHORUS 3.2 3.4 3.0 3.4  --          Lab 03/11/25  0014   TOTAL PROTEIN 7.5   ALBUMIN 3.7   GLOBULIN 3.8   ALT (SGPT) 30   AST (SGOT) 27   BILIRUBIN 0.5   ALK PHOS 82                     Brief Urine Lab Results  (Last result in the past 365 days)        Color   Clarity   Blood   Leuk Est   Nitrite   Protein   CREAT   Urine HCG        07/05/24 1449 Red   Turbid   Large (3+)   Trace   Negative   30 mg/dL (1+)                 Microbiology Results (last 10 days)       Procedure Component Value - Date/Time    Blood Culture - Blood, Arm, Left [[id number]]  (Normal) Collected: 03/10/25 1508    Lab Status: Preliminary result Specimen: Blood from Arm,  Left Updated: 03/12/25 1515     Blood Culture No growth at 2 days    Blood Culture - Blood, Arm, Right [[id number]]  (Normal) Collected: 03/10/25 1315    Lab Status: Preliminary result Specimen: Blood from Arm, Right Updated: 03/12/25 1330     Blood Culture No growth at 2 days    Wound Culture - Wound, Leg, Right [[id number]]  (Abnormal)  (Susceptibility) Collected: 03/09/25 1012    Lab Status: Final result Specimen: Wound from Leg, Right Updated: 03/12/25 0732     Wound Culture Heavy growth (4+) Staphylococcus aureus, MRSA     Comment:   Methicillin resistant Staphylococcus aureus, Patient may be an isolation risk.         Heavy growth (4+) Enterococcus faecalis      Heavy growth (4+) Normal Skin Barbara     Gram Stain Many (4+) WBCs per low power field      Many (4+) Gram positive cocci      Many (4+) Gram negative bacilli    Susceptibility        Staphylococcus aureus, MRSA      QUINN      Clindamycin Resistant      Erythromycin Resistant      Oxacillin Resistant      Rifampin Susceptible      Tetracycline Susceptible      Trimethoprim + Sulfamethoxazole Susceptible      Vancomycin Susceptible                       Susceptibility        Enterococcus faecalis      QUINN      Ampicillin Susceptible      Vancomycin Susceptible                       Susceptibility Comments       Staphylococcus aureus, MRSA    This isolate is presumed to be clindamycin resistant based on detection of inducible clindamycin resistance.  Clindamycin may still be effective in some patients.                        Results for orders placed during the hospital encounter of 03/09/25    Duplex Venous Lower Extremity - RIGHT    Interpretation Summary    Chronic right lower extremity deep vein thrombosis noted in the popliteal.    All other right sided veins appeared normal.    Results for orders placed during the hospital encounter of 03/09/25    Duplex Venous Lower Extremity - RIGHT    Interpretation Summary    Chronic right lower extremity deep vein  thrombosis noted in the popliteal.    All other right sided veins appeared normal.        Labs Pending at Discharge:   Pending Results       None            Procedures Performed   None       Consults:   Consults       Date and Time Order Name Status Description    3/9/2025 12:57 PM Inpatient Infectious Diseases Consult Completed     3/9/2025 12:20 PM Hospitalist (on-call MD unless specified)              Discharge Details        Discharge Medications        ASK your doctor about these medications        Instructions Start Date   albuterol sulfate  (90 Base) MCG/ACT inhaler  Commonly known as: PROVENTIL HFA;VENTOLIN HFA;PROAIR HFA   2 puffs, Inhalation, Every 4 Hours PRN      budesonide-formoterol 80-4.5 MCG/ACT inhaler  Commonly known as: Symbicort   2 puffs, Inhalation, 2 Times Daily - RT      carvedilol 25 MG tablet  Commonly known as: COREG   25 mg, Oral, 2 Times Daily With Meals      Eliquis 5 MG tablet tablet  Generic drug: apixaban   5 mg, Oral, 2 Times Daily      hydrALAZINE 25 MG tablet  Commonly known as: APRESOLINE   25 mg, Oral, 3 Times Daily      hydroCHLOROthiazide 25 MG tablet   25 mg, Oral, Daily      loperamide 2 MG tablet  Commonly known as: Imodium A-D   2 mg, Oral, 4 Times Daily PRN      multivitamin tablet tablet   1 tablet, Daily      pantoprazole 40 MG EC tablet  Commonly known as: PROTONIX   40 mg, 2 Times Daily      potassium chloride 10 MEQ CR capsule  Commonly known as: MICRO-K   10 mEq, Oral, Daily      rOPINIRole 0.25 MG tablet  Commonly known as: REQUIP  Ask about: Which instructions should I use?   0.25 mg, Nightly      sertraline 25 MG tablet  Commonly known as: Zoloft   25 mg, Oral, Daily      silver sulfadiazine 1 % cream  Commonly known as: SILVADENE, SSD   1 Application, Topical, Every 24 Hours Scheduled      torsemide 100 MG tablet  Commonly known as: DEMADEX   100 mg, Oral, Daily      vitamin D3 125 MCG (5000 UT) capsule capsule   5,000 Units, Oral, Daily                Allergies   Allergen Reactions    Promethazine Itching    Vancomycin Anaphylaxis    Calamine-Zinc Oxide Itching and Rash       Discharge Disposition:   Home    Diet:  Heart healthy    Discharge Activity:   As tolerated    CODE STATUS:  Code Status and Medical Interventions: CPR (Attempt to Resuscitate); Full Support   Ordered at: 03/09/25 1257     Code Status (Patient has no pulse and is not breathing):    CPR (Attempt to Resuscitate)     Medical Interventions (Patient has pulse or is breathing):    Full Support       Future Appointments   Date Time Provider Department Center   3/17/2025 11:15 AM Mis Boggs APRN [facility]   3/17/2025  1:30 PM Brandie Hernandez MD [facility]           Time spent on Discharge including face to face service:  >30 minutes    Signature: Electronically signed by Maritza Krause MD, 03/13/25, 08:42 EDT.  [facility], [place] by [facility] Hospitalist Team

## 2025-03-13 NOTE — CASE MANAGEMENT/SOCIAL WORK
Case Management Discharge Note      Final Note: Routine home         Selected Continued Care - Discharged on 3/13/2025 Admission date: 3/9/2025 - Discharge disposition: Home or Self Care         Transportation Services  Private: Car    Final Discharge Disposition Code: 01 - home or self-care   BMI: BMI (kg/m2): 44.6 (01-17-24 @ 00:46)  HbA1c: A1C with Estimated Average Glucose Result: 4.8 % (01-04-24 @ 02:03)    Glucose: POCT Blood Glucose.: 105 mg/dL (01-11-24 @ 12:26)    BP: 93/49 (01-17-24 @ 07:30) (93/49 - 93/49)Vital Signs Last 24 Hrs  T(C): 37.1 (01-17-24 @ 07:30), Max: 37.1 (01-17-24 @ 07:30)  T(F): 98.7 (01-17-24 @ 07:30), Max: 98.7 (01-17-24 @ 07:30)  HR: 60 (01-17-24 @ 07:30) (60 - 60)  BP: 93/49 (01-17-24 @ 07:30) (93/49 - 93/49)  BP(mean): --  RR: 18 (01-17-24 @ 00:46) (18 - 18)  SpO2: 99% (01-17-24 @ 00:46) (99% - 99%)    Orthostatic VS  01-17-24 @ 00:46  Lying BP: --/-- HR: --  Sitting BP: 119/79 HR: 82  Standing BP: 121/81 HR: 84  Site: --  Mode: --    Lipid Panel: Date/Time: 01-09-24 @ 07:06  Cholesterol, Serum: 79  LDL Cholesterol Calculated: 24  HDL Cholesterol, Serum: 32  Total Cholesterol/HDL Ration Measurement: --  Triglycerides, Serum: 115   BMI: BMI (kg/m2): 44.6 (01-17-24 @ 00:46)  HbA1c: A1C with Estimated Average Glucose Result: 4.8 % (01-04-24 @ 02:03)    Glucose: POCT Blood Glucose.: 105 mg/dL (01-11-24 @ 12:26)    BP: 113/82 (01-17-24 @ 20:49) (93/49 - 113/82)Vital Signs Last 24 Hrs  T(C): --  T(F): --  HR: --  BP: 113/82 (01-17-24 @ 20:49) (113/82 - 113/82)  BP(mean): 100 (01-17-24 @ 20:49) (100 - 100)  RR: --  SpO2: --    Orthostatic VS  01-17-24 @ 00:46  Lying BP: --/-- HR: --  Sitting BP: 119/79 HR: 82  Standing BP: 121/81 HR: 84  Site: --  Mode: --    Lipid Panel: Date/Time: 01-09-24 @ 07:06  Cholesterol, Serum: 79  LDL Cholesterol Calculated: 24  HDL Cholesterol, Serum: 32  Total Cholesterol/HDL Ration Measurement: --  Triglycerides, Serum: 115

## 2025-03-13 NOTE — PLAN OF CARE
Goal Outcome Evaluation:   Pt awake most of the night.  Pt c/o leg pain and prn pain med given.  Pt states pain med is effective.  Pt instructed to call for assistance and pt able to verbalize an understanding.  Call bell within pt's reach.  Will continue current plan of care.

## 2025-03-13 NOTE — PLAN OF CARE
Problem: Adult Inpatient Plan of Care  Goal: Plan of Care Review  Outcome: Met  Goal: Patient-Specific Goal (Individualized)  Outcome: Met  Goal: Absence of Hospital-Acquired Illness or Injury  Outcome: Met  Goal: Optimal Comfort and Wellbeing  Outcome: Met  Goal: Readiness for Transition of Care  Outcome: Met     Problem: Infection  Goal: Absence of Infection Signs and Symptoms  Outcome: Met     Problem: Pain Acute  Goal: Optimal Pain Control and Function  Outcome: Met     Problem: Skin Injury Risk Increased  Goal: Skin Health and Integrity  Outcome: Met     Problem: Fall Injury Risk  Goal: Absence of Fall and Fall-Related Injury  Outcome: Met   Goal Outcome Evaluation:

## 2025-03-14 ENCOUNTER — TRANSITIONAL CARE MANAGEMENT TELEPHONE ENCOUNTER (OUTPATIENT)
Dept: CALL CENTER | Facility: HOSPITAL | Age: 45
End: 2025-03-14
Payer: MEDICARE

## 2025-03-14 ENCOUNTER — TELEPHONE (OUTPATIENT)
Dept: FAMILY MEDICINE CLINIC | Facility: CLINIC | Age: 45
End: 2025-03-14
Payer: MEDICARE

## 2025-03-14 NOTE — TELEPHONE ENCOUNTER
RELAY    LVM for pt.  Appointment scheduled on Monday with Mis HINTON will be canceled due to pt being dismissed from practice 10/28/24.  [employer]ish message also informing of appt cancelation and also sent with a copy of letter that was sent informing patient of dismissal on 10/28/24.  Text msg sent to pt informing to read [email or web address] message.    Appt canceled.

## 2025-03-14 NOTE — OUTREACH NOTE
Prep Survey      Flowsheet Row Responses   [facility]? [place]   Is LACE score < 7 ? No   Eligibility [facility]   Date of Admission 03/09/25   Date of Discharge 03/13/25   Discharge Disposition Home or Self Care   Discharge diagnosis Cellulitis   Does the patient have one of the following disease processes/diagnoses(primary or secondary)? Other   Does the patient have Home health ordered? No   Is there a DME ordered? No   Comments regarding appointments no pcp   Prep survey completed? Yes            RANDA MONTEZ - Registered Nurse

## 2025-03-14 NOTE — OUTREACH NOTE
Call Center TCM Note      Flowsheet Row Responses   [facility] patient discharged from? [place]   Does the patient have one of the following disease processes/diagnoses(primary or secondary)? Other   TCM attempt successful? Yes   Call start time 1122   Call end time 1125   Meds reviewed with patient/caregiver? Yes   Is the patient having any side effects they believe may be caused by any medication additions or changes? No   Does the patient have all medications ordered at discharge? Yes   Is the patient taking all medications as directed (includes completed medication regime)? Yes   Comments New patient appt with PCP on 03/20/25.   Does the patient have an appointment with their PCP within 7-14 days of discharge? Yes   Has home health visited the patient within 72 hours of discharge? N/A   Psychosocial issues? No   Did the patient receive a copy of their discharge instructions? Yes   Nursing interventions Reviewed instructions with patient   What is the patient's perception of their health status since discharge? Same   Is the patient/caregiver able to teach back signs and symptoms related to disease process for when to call PCP? Yes   Is the patient/caregiver able to teach back signs and symptoms related to disease process for when to call 911? Yes   Is the patient/caregiver able to teach back the hierarchy of who to call/visit for symptoms/problems? PCP, Specialist, Home health nurse, Urgent Care, ED, 911 Yes   If the patient is a current smoker, are they able to teach back resources for cessation? Not a smoker   Additional teach back comments States aware of s/s of worsening infection, and need for immediate evaluation if any noted.   TCM call completed? Yes   Wrap up additional comments States is about the same. Denies any s/s of worsening infection. Reports still has some redness/drainage from RLE wound. States will go to wound care appt next week. Denies any needs or concerns today. TCM complete.   Call  end time 1125   Would this patient benefit from a Referral to [facility] Social Work? No   Is the patient interested in additional calls from an ambulatory ? No            Shereen Ravi RN    3/14/2025, 11:26 EDT

## 2025-03-15 LAB
BACTERIA SPEC AEROBE CULT: NORMAL
BACTERIA SPEC AEROBE CULT: NORMAL

## 2025-03-17 ENCOUNTER — HOSPITAL ENCOUNTER (INPATIENT)
Facility: HOSPITAL | Age: 45
LOS: 2 days | Discharge: HOME | DRG: 603 | End: 2025-03-20
Attending: INTERNAL MEDICINE | Admitting: INTERNAL MEDICINE
Payer: MEDICARE

## 2025-03-17 ENCOUNTER — NURSE TRIAGE (OUTPATIENT)
Dept: CALL CENTER | Facility: HOSPITAL | Age: 45
End: 2025-03-17
Payer: MEDICARE

## 2025-03-17 ENCOUNTER — TELEPHONE (OUTPATIENT)
Dept: SURGERY | Facility: CLINIC | Age: 45
End: 2025-03-17

## 2025-03-17 DIAGNOSIS — A49.02 MRSA (METHICILLIN RESISTANT STAPHYLOCOCCUS AUREUS): ICD-10-CM

## 2025-03-17 DIAGNOSIS — L08.9 SKIN INFECTION: Primary | ICD-10-CM

## 2025-03-17 DIAGNOSIS — L03.115 CELLULITIS OF RIGHT LOWER EXTREMITY: ICD-10-CM

## 2025-03-17 LAB
ALBUMIN SERPL-MCNC: 4 G/DL (ref 3.5–5.2)
ALBUMIN/GLOB SERPL: 1 G/DL
ALP SERPL-CCNC: 82 U/L (ref 39–117)
ALT SERPL W P-5'-P-CCNC: 30 U/L (ref 1–41)
ANION GAP SERPL CALCULATED.3IONS-SCNC: 9.7 MMOL/L (ref 5–15)
AST SERPL-CCNC: 28 U/L (ref 1–40)
BASOPHILS # BLD AUTO: 0.06 10*3/MM3 (ref 0–0.2)
BASOPHILS NFR BLD AUTO: 0.7 % (ref 0–1.5)
BILIRUB SERPL-MCNC: 0.7 MG/DL (ref 0–1.2)
BUN SERPL-MCNC: 13 MG/DL (ref 6–20)
BUN/CREAT SERPL: 16.9 (ref 7–25)
CALCIUM SPEC-SCNC: 8.7 MG/DL (ref 8.6–10.5)
CHLORIDE SERPL-SCNC: 102 MMOL/L (ref 98–107)
CO2 SERPL-SCNC: 26.3 MMOL/L (ref 22–29)
CREAT SERPL-MCNC: 0.77 MG/DL (ref 0.76–1.27)
CRP SERPL-MCNC: 1.07 MG/DL (ref 0–0.5)
D-LACTATE SERPL-SCNC: 0.6 MMOL/L (ref 0.3–2)
DEPRECATED RDW RBC AUTO: 46.5 FL (ref 37–54)
EGFRCR SERPLBLD CKD-EPI 2021: 111.8 ML/MIN/1.73
EOSINOPHIL # BLD AUTO: 0.29 10*3/MM3 (ref 0–0.4)
EOSINOPHIL NFR BLD AUTO: 3.3 % (ref 0.3–6.2)
ERYTHROCYTE [DISTWIDTH] IN BLOOD BY AUTOMATED COUNT: 14 % (ref 12.3–15.4)
ERYTHROCYTE [SEDIMENTATION RATE] IN BLOOD: 38 MM/HR (ref 0–15)
GLOBULIN UR ELPH-MCNC: 4.2 GM/DL
GLUCOSE SERPL-MCNC: 93 MG/DL (ref 65–99)
HCT VFR BLD AUTO: 43 % (ref 37.5–51)
HGB BLD-MCNC: 13.7 G/DL (ref 13–17.7)
HOLD SPECIMEN: NORMAL
IMM GRANULOCYTES # BLD AUTO: 0.02 10*3/MM3 (ref 0–0.05)
IMM GRANULOCYTES NFR BLD AUTO: 0.2 % (ref 0–0.5)
LYMPHOCYTES # BLD AUTO: 2.51 10*3/MM3 (ref 0.7–3.1)
LYMPHOCYTES NFR BLD AUTO: 28.3 % (ref 19.6–45.3)
MCH RBC QN AUTO: 28.7 PG (ref 26.6–33)
MCHC RBC AUTO-ENTMCNC: 31.9 G/DL (ref 31.5–35.7)
MCV RBC AUTO: 90 FL (ref 79–97)
MONOCYTES # BLD AUTO: 0.77 10*3/MM3 (ref 0.1–0.9)
MONOCYTES NFR BLD AUTO: 8.7 % (ref 5–12)
NEUTROPHILS NFR BLD AUTO: 5.23 10*3/MM3 (ref 1.7–7)
NEUTROPHILS NFR BLD AUTO: 58.8 % (ref 42.7–76)
NRBC BLD AUTO-RTO: 0 /100 WBC (ref 0–0.2)
PLATELET # BLD AUTO: 175 10*3/MM3 (ref 140–450)
PMV BLD AUTO: 10.3 FL (ref 6–12)
POTASSIUM SERPL-SCNC: 3.5 MMOL/L (ref 3.5–5.2)
PROT SERPL-MCNC: 8.2 G/DL (ref 6–8.5)
RBC # BLD AUTO: 4.78 10*6/MM3 (ref 4.14–5.8)
SODIUM SERPL-SCNC: 138 MMOL/L (ref 136–145)
WBC NRBC COR # BLD AUTO: 8.88 10*3/MM3 (ref 3.4–10.8)
WHOLE BLOOD HOLD COAG: NORMAL

## 2025-03-17 PROCEDURE — 25010000002 PIPERACILLIN SOD-TAZOBACTAM PER 1 G

## 2025-03-17 PROCEDURE — 25010000002 ONDANSETRON PER 1 MG

## 2025-03-17 PROCEDURE — 85025 COMPLETE CBC W/AUTO DIFF WBC: CPT | Performed by: NURSE PRACTITIONER

## 2025-03-17 PROCEDURE — 87040 BLOOD CULTURE FOR BACTERIA: CPT | Performed by: NURSE PRACTITIONER

## 2025-03-17 PROCEDURE — 83605 ASSAY OF LACTIC ACID: CPT

## 2025-03-17 PROCEDURE — 25810000003 SODIUM CHLORIDE 0.9 % SOLUTION

## 2025-03-17 PROCEDURE — 25010000002 MORPHINE PER 10 MG

## 2025-03-17 PROCEDURE — 99285 EMERGENCY DEPT VISIT HI MDM: CPT

## 2025-03-17 PROCEDURE — 80053 COMPREHEN METABOLIC PANEL: CPT | Performed by: NURSE PRACTITIONER

## 2025-03-17 PROCEDURE — 86140 C-REACTIVE PROTEIN: CPT | Performed by: NURSE PRACTITIONER

## 2025-03-17 PROCEDURE — 82550 ASSAY OF CK (CPK): CPT | Performed by: NURSE PRACTITIONER

## 2025-03-17 PROCEDURE — 85652 RBC SED RATE AUTOMATED: CPT | Performed by: NURSE PRACTITIONER

## 2025-03-17 PROCEDURE — 36415 COLL VENOUS BLD VENIPUNCTURE: CPT

## 2025-03-17 RX ORDER — SODIUM CHLORIDE 0.9 % (FLUSH) 0.9 %
10 SYRINGE (ML) INJECTION AS NEEDED
Status: DISCONTINUED | OUTPATIENT
Start: 2025-03-17 | End: 2025-03-20 | Stop reason: HOSPADM

## 2025-03-17 RX ORDER — LINEZOLID 2 MG/ML
600 INJECTION, SOLUTION INTRAVENOUS ONCE
Status: COMPLETED | OUTPATIENT
Start: 2025-03-17 | End: 2025-03-18

## 2025-03-17 RX ORDER — ONDANSETRON 2 MG/ML
4 INJECTION INTRAMUSCULAR; INTRAVENOUS ONCE
Status: COMPLETED | OUTPATIENT
Start: 2025-03-17 | End: 2025-03-17

## 2025-03-17 RX ADMIN — SODIUM CHLORIDE 1000 ML: 9 INJECTION, SOLUTION INTRAVENOUS at 23:05

## 2025-03-17 RX ADMIN — PIPERACILLIN AND TAZOBACTAM 4.5 G: 4; .5 INJECTION, POWDER, FOR SOLUTION INTRAVENOUS at 23:10

## 2025-03-17 RX ADMIN — MORPHINE SULFATE 4 MG: 4 INJECTION, SOLUTION INTRAMUSCULAR; INTRAVENOUS at 23:10

## 2025-03-17 RX ADMIN — ONDANSETRON 4 MG: 2 INJECTION, SOLUTION INTRAMUSCULAR; INTRAVENOUS at 23:10

## 2025-03-17 NOTE — ED TRIAGE NOTES
PT arrived via PV for a wound check. PT reports he was diagnosed with cellulitis and MRSA in his RLE. PT reports he was d/c on Thursday and has not seen improvement in wound after being on antibiotics. Pt reports chills and diarrhea

## 2025-03-17 NOTE — Clinical Note
Level of Care: Med/Surg [1]   Admitting Physician: RODO GEORGE [[id number]]   Attending Physician: RODO GEORGE [[id number]]

## 2025-03-17 NOTE — TELEPHONE ENCOUNTER
Caller: ANGELES SÁNCHEZ    Relationship:  SELF    Best call back number: [phone]    PATIENT CALLED REQUESTING TO CANCEL SAME DAY APPT.    Did the patient call AFTER the start time of their scheduled appointment?  []YES  [x]NO    Was the patient's appointment rescheduled? [x]YES  []NO    Any additional information: PT RUSTY TO 3-24-25    .

## 2025-03-17 NOTE — TELEPHONE ENCOUNTER
"He was discharged from [facility] on 03/13/2025- with cellulitis of the left leg. Today he calls with chills, fever and feels unwell. He wants to know if it is okay to return to the hospital. His wound is leaking and weeping fluid. Yes he will return to the ER.   Reason for Disposition   Health Information question, no triage required and triager able to answer question    Additional Information   Negative: [1] Caller is not with the adult (patient) AND [2] reporting urgent symptoms   Negative: Lab result questions   Negative: Medication questions   Negative: Caller can't be reached by phone   Negative: Caller has already spoken to PCP or another triager   Negative: RN needs further essential information from caller in order to complete triage   Negative: Requesting regular office appointment   Negative: [1] Caller requesting NON-URGENT health information AND [2] PCP's office is the best resource   Negative: General information question, no triage required and triager able to answer question    Answer Assessment - Initial Assessment Questions  1. REASON FOR CALL or QUESTION: \"What is your reason for calling today?\" or \"How can I best help you?\" or \"What question do you have that I can help answer?\"      Answered question    Protocols used: Information Only Call - No Triage-ADULT-    "

## 2025-03-18 ENCOUNTER — READMISSION MANAGEMENT (OUTPATIENT)
Dept: CALL CENTER | Facility: HOSPITAL | Age: 45
End: 2025-03-18
Payer: MEDICARE

## 2025-03-18 ENCOUNTER — APPOINTMENT (OUTPATIENT)
Dept: CARDIOLOGY | Facility: HOSPITAL | Age: 45
DRG: 603 | End: 2025-03-18
Payer: MEDICARE

## 2025-03-18 LAB
CK SERPL-CCNC: 49 U/L (ref 20–200)
POTASSIUM SERPL-SCNC: 4 MMOL/L (ref 3.5–5.2)

## 2025-03-18 PROCEDURE — 25010000002 LINEZOLID 600 MG/300ML SOLUTION: Performed by: INTERNAL MEDICINE

## 2025-03-18 PROCEDURE — 87205 SMEAR GRAM STAIN: CPT | Performed by: NURSE PRACTITIONER

## 2025-03-18 PROCEDURE — 94799 UNLISTED PULMONARY SVC/PX: CPT

## 2025-03-18 PROCEDURE — 87077 CULTURE AEROBIC IDENTIFY: CPT | Performed by: NURSE PRACTITIONER

## 2025-03-18 PROCEDURE — 25010000002 LINEZOLID 600 MG/300ML SOLUTION

## 2025-03-18 PROCEDURE — 84132 ASSAY OF SERUM POTASSIUM: CPT | Performed by: INTERNAL MEDICINE

## 2025-03-18 PROCEDURE — 25010000002 CEFEPIME PER 500 MG: Performed by: INTERNAL MEDICINE

## 2025-03-18 PROCEDURE — 87070 CULTURE OTHR SPECIMN AEROBIC: CPT | Performed by: NURSE PRACTITIONER

## 2025-03-18 PROCEDURE — 87186 SC STD MICRODIL/AGAR DIL: CPT | Performed by: NURSE PRACTITIONER

## 2025-03-18 RX ORDER — HYDROCODONE BITARTRATE AND ACETAMINOPHEN 5; 325 MG/1; MG/1
1 TABLET ORAL EVERY 6 HOURS PRN
Refills: 0 | Status: DISCONTINUED | OUTPATIENT
Start: 2025-03-18 | End: 2025-03-20 | Stop reason: HOSPADM

## 2025-03-18 RX ORDER — SODIUM CHLORIDE 9 MG/ML
40 INJECTION, SOLUTION INTRAVENOUS AS NEEDED
Status: DISCONTINUED | OUTPATIENT
Start: 2025-03-18 | End: 2025-03-20 | Stop reason: HOSPADM

## 2025-03-18 RX ORDER — ACETAMINOPHEN 160 MG/5ML
650 SOLUTION ORAL EVERY 4 HOURS PRN
Status: DISCONTINUED | OUTPATIENT
Start: 2025-03-18 | End: 2025-03-20 | Stop reason: HOSPADM

## 2025-03-18 RX ORDER — HYDRALAZINE HYDROCHLORIDE 25 MG/1
25 TABLET, FILM COATED ORAL 3 TIMES DAILY
Status: DISCONTINUED | OUTPATIENT
Start: 2025-03-18 | End: 2025-03-20 | Stop reason: HOSPADM

## 2025-03-18 RX ORDER — BUDESONIDE AND FORMOTEROL FUMARATE DIHYDRATE 160; 4.5 UG/1; UG/1
2 AEROSOL RESPIRATORY (INHALATION)
Status: DISCONTINUED | OUTPATIENT
Start: 2025-03-18 | End: 2025-03-20 | Stop reason: HOSPADM

## 2025-03-18 RX ORDER — PANTOPRAZOLE SODIUM 40 MG/1
40 TABLET, DELAYED RELEASE ORAL 2 TIMES DAILY
Status: DISCONTINUED | OUTPATIENT
Start: 2025-03-18 | End: 2025-03-20 | Stop reason: HOSPADM

## 2025-03-18 RX ORDER — POLYETHYLENE GLYCOL 3350 17 G/17G
17 POWDER, FOR SOLUTION ORAL DAILY PRN
Status: DISCONTINUED | OUTPATIENT
Start: 2025-03-18 | End: 2025-03-20 | Stop reason: HOSPADM

## 2025-03-18 RX ORDER — BISACODYL 5 MG/1
5 TABLET, DELAYED RELEASE ORAL DAILY PRN
Status: DISCONTINUED | OUTPATIENT
Start: 2025-03-18 | End: 2025-03-20 | Stop reason: HOSPADM

## 2025-03-18 RX ORDER — TORSEMIDE 100 MG/1
100 TABLET ORAL DAILY
Status: DISCONTINUED | OUTPATIENT
Start: 2025-03-18 | End: 2025-03-20 | Stop reason: HOSPADM

## 2025-03-18 RX ORDER — AMOXICILLIN 250 MG
2 CAPSULE ORAL 2 TIMES DAILY PRN
Status: DISCONTINUED | OUTPATIENT
Start: 2025-03-18 | End: 2025-03-20 | Stop reason: HOSPADM

## 2025-03-18 RX ORDER — ACETAMINOPHEN 650 MG/1
650 SUPPOSITORY RECTAL EVERY 4 HOURS PRN
Status: DISCONTINUED | OUTPATIENT
Start: 2025-03-18 | End: 2025-03-20 | Stop reason: HOSPADM

## 2025-03-18 RX ORDER — HYDROCHLOROTHIAZIDE 25 MG/1
25 TABLET ORAL DAILY
Status: DISCONTINUED | OUTPATIENT
Start: 2025-03-18 | End: 2025-03-20 | Stop reason: HOSPADM

## 2025-03-18 RX ORDER — ONDANSETRON 2 MG/ML
4 INJECTION INTRAMUSCULAR; INTRAVENOUS EVERY 6 HOURS PRN
Status: DISCONTINUED | OUTPATIENT
Start: 2025-03-18 | End: 2025-03-20 | Stop reason: HOSPADM

## 2025-03-18 RX ORDER — CARVEDILOL 25 MG/1
25 TABLET ORAL 2 TIMES DAILY WITH MEALS
Status: DISCONTINUED | OUTPATIENT
Start: 2025-03-18 | End: 2025-03-20 | Stop reason: HOSPADM

## 2025-03-18 RX ORDER — ROPINIROLE 0.25 MG/1
0.25 TABLET, FILM COATED ORAL NIGHTLY
Status: DISCONTINUED | OUTPATIENT
Start: 2025-03-18 | End: 2025-03-20 | Stop reason: HOSPADM

## 2025-03-18 RX ORDER — LINEZOLID 2 MG/ML
600 INJECTION, SOLUTION INTRAVENOUS EVERY 12 HOURS
Status: DISCONTINUED | OUTPATIENT
Start: 2025-03-18 | End: 2025-03-20 | Stop reason: HOSPADM

## 2025-03-18 RX ORDER — NITROGLYCERIN 0.4 MG/1
0.4 TABLET SUBLINGUAL
Status: DISCONTINUED | OUTPATIENT
Start: 2025-03-18 | End: 2025-03-20 | Stop reason: HOSPADM

## 2025-03-18 RX ORDER — BISACODYL 10 MG
10 SUPPOSITORY, RECTAL RECTAL DAILY PRN
Status: DISCONTINUED | OUTPATIENT
Start: 2025-03-18 | End: 2025-03-20 | Stop reason: HOSPADM

## 2025-03-18 RX ORDER — ACETAMINOPHEN 325 MG/1
650 TABLET ORAL EVERY 4 HOURS PRN
Status: DISCONTINUED | OUTPATIENT
Start: 2025-03-18 | End: 2025-03-20 | Stop reason: HOSPADM

## 2025-03-18 RX ORDER — ALUMINA, MAGNESIA, AND SIMETHICONE 2400; 2400; 240 MG/30ML; MG/30ML; MG/30ML
15 SUSPENSION ORAL EVERY 6 HOURS PRN
Status: DISCONTINUED | OUTPATIENT
Start: 2025-03-18 | End: 2025-03-20 | Stop reason: HOSPADM

## 2025-03-18 RX ORDER — ONDANSETRON 4 MG/1
4 TABLET, ORALLY DISINTEGRATING ORAL EVERY 6 HOURS PRN
Status: DISCONTINUED | OUTPATIENT
Start: 2025-03-18 | End: 2025-03-20 | Stop reason: HOSPADM

## 2025-03-18 RX ORDER — SODIUM CHLORIDE 0.9 % (FLUSH) 0.9 %
10 SYRINGE (ML) INJECTION AS NEEDED
Status: DISCONTINUED | OUTPATIENT
Start: 2025-03-18 | End: 2025-03-20 | Stop reason: HOSPADM

## 2025-03-18 RX ORDER — POTASSIUM CHLORIDE 1500 MG/1
40 TABLET, EXTENDED RELEASE ORAL EVERY 4 HOURS
Status: COMPLETED | OUTPATIENT
Start: 2025-03-18 | End: 2025-03-18

## 2025-03-18 RX ORDER — SODIUM CHLORIDE 0.9 % (FLUSH) 0.9 %
10 SYRINGE (ML) INJECTION EVERY 12 HOURS SCHEDULED
Status: DISCONTINUED | OUTPATIENT
Start: 2025-03-18 | End: 2025-03-20 | Stop reason: HOSPADM

## 2025-03-18 RX ADMIN — APIXABAN 5 MG: 5 TABLET, FILM COATED ORAL at 20:07

## 2025-03-18 RX ADMIN — CEFEPIME 2000 MG: 2 INJECTION, POWDER, FOR SOLUTION INTRAVENOUS at 10:37

## 2025-03-18 RX ADMIN — CARVEDILOL 25 MG: 25 TABLET, FILM COATED ORAL at 18:03

## 2025-03-18 RX ADMIN — Medication 10 ML: at 10:37

## 2025-03-18 RX ADMIN — HYDROCHLOROTHIAZIDE 25 MG: 25 TABLET ORAL at 15:51

## 2025-03-18 RX ADMIN — ROPINIROLE HYDROCHLORIDE 0.25 MG: 0.25 TABLET, FILM COATED ORAL at 20:07

## 2025-03-18 RX ADMIN — POTASSIUM CHLORIDE 40 MEQ: 1500 TABLET, EXTENDED RELEASE ORAL at 01:33

## 2025-03-18 RX ADMIN — HYDROCODONE BITARTRATE AND ACETAMINOPHEN 1 TABLET: 5; 325 TABLET ORAL at 01:18

## 2025-03-18 RX ADMIN — CEFEPIME 2000 MG: 2 INJECTION, POWDER, FOR SOLUTION INTRAVENOUS at 01:19

## 2025-03-18 RX ADMIN — TORSEMIDE 100 MG: 100 TABLET ORAL at 18:03

## 2025-03-18 RX ADMIN — BUDESONIDE AND FORMOTEROL FUMARATE DIHYDRATE 2 PUFF: 160; 4.5 AEROSOL RESPIRATORY (INHALATION) at 18:58

## 2025-03-18 RX ADMIN — CEFEPIME 2000 MG: 2 INJECTION, POWDER, FOR SOLUTION INTRAVENOUS at 18:03

## 2025-03-18 RX ADMIN — PANTOPRAZOLE SODIUM 40 MG: 40 TABLET, DELAYED RELEASE ORAL at 20:07

## 2025-03-18 RX ADMIN — HYDROCODONE BITARTRATE AND ACETAMINOPHEN 1 TABLET: 5; 325 TABLET ORAL at 11:04

## 2025-03-18 RX ADMIN — LINEZOLID 600 MG: 600 INJECTION, SOLUTION INTRAVENOUS at 00:11

## 2025-03-18 RX ADMIN — LINEZOLID 600 MG: 600 INJECTION, SOLUTION INTRAVENOUS at 12:44

## 2025-03-18 RX ADMIN — LINEZOLID 600 MG: 600 INJECTION, SOLUTION INTRAVENOUS at 23:08

## 2025-03-18 RX ADMIN — HYDRALAZINE HYDROCHLORIDE 25 MG: 25 TABLET ORAL at 15:51

## 2025-03-18 RX ADMIN — HYDRALAZINE HYDROCHLORIDE 25 MG: 25 TABLET ORAL at 20:07

## 2025-03-18 RX ADMIN — POTASSIUM CHLORIDE 40 MEQ: 1500 TABLET, EXTENDED RELEASE ORAL at 05:03

## 2025-03-18 RX ADMIN — Medication 10 ML: at 20:08

## 2025-03-18 RX ADMIN — HYDROCODONE BITARTRATE AND ACETAMINOPHEN 1 TABLET: 5; 325 TABLET ORAL at 18:02

## 2025-03-18 NOTE — H&P
History and Physical   Farhat Hein : 1980 MRN:[id number] LOS:0     Reason for admission: Cellulitis     Assessment / Plan     #Worsening right lower extremity cellulitis  -Presented with more swelling and drainage in the right lower extremity.  Recently discharged from hospital oral medication  -Wound culture on  with Enterococcus faecalis and MRSA  -zyvox and  cefepime  -wound culture blood culture to follow-up.  Infectious disease consulted.      Code Status (Patient has no pulse and is not breathing): CPR (Attempt to Resuscitate)  Medical Interventions (Patient has pulse or is breathing): Full Support       Nutrition: Diet: Cardiac; Healthy Heart (2-3 Na+); Fluid Consistency: Thin (IDDSI 0)     DVT Prophylaxis: Active VTE Prophylaxis  Mechanical:        Start        25  Maintain Sequential Compression Device  Continuous                          Select Pharmacologic VTE Prophylaxis if Desired & Appropriate      History of Present illness     A 45 y.o. old male patient with PMH of asthma COPD GERD CKD type 2 diabetes mellitus morbid obesity, restless leg syndrome, DVT, factor V Le[place], presents to the hospital with complaints of worsening right lower extremity cellulitis.  Patient was recently in hospital for right lower extremity cellulitis and got discharged with oral medication Augmentin and doxycycline. His wound Cx on 3/9 with Enterococcus faecalis and MRSA. Allergy to vancomycin. He is started on the zyvox in the ED. One dose of zosyn given. Added cefepime. Wound Cx and BCX to follow up.     Admitted for cellulitis.       Subjective / Review of systems     Review of Systems   Pain in right LE    Past Medical/Surgical/Social/Family History & Allergies     Past Medical History:   Diagnosis Date    Allergic 1979    seasonal    Arthritis 2020    legs    Asthma 2024    Chronic obstructive pulmonary disease 2023    COPD (chronic obstructive pulmonary disease)  2011    GERD (gastroesophageal reflux disease) 2006    Hypertension 2006    Kidney stone 2010    Low back pain 2020    Obesity 1997    Pneumonia 2023    Renal insufficiency 2021    Stroke 2015    Substance abuse     Type 2 diabetes mellitus 2019    Urinary tract infection 03/15/2020      Past Surgical History:   Procedure Laterality Date    APPENDECTOMY  2017    CHOLECYSTECTOMY  2016      Social History     Socioeconomic History    Marital status:    Tobacco Use    Smoking status: Former     Current packs/day: 0.00     Average packs/day: 2.0 packs/day for 10.4 years (20.8 ttl pk-yrs)     Types: Cigarettes     Start date: 1998     Quit date: 2008     Years since quittin.7     Passive exposure: Past    Smokeless tobacco: Never   Vaping Use    Vaping status: Never Used   Substance and Sexual Activity    Alcohol use: Not Currently     Alcohol/week: 12.0 standard drinks of alcohol     Types: 12 Cans of beer per week     Comment: PER DAY    Drug use: Yes     Frequency: 7.0 times per week     Types: Marijuana    Sexual activity: Yes     Partners: Female     Birth control/protection: Condom      Family History   Problem Relation Age of Onset    Arthritis Mother     COPD Mother     Diabetes Mother     Hyperlipidemia Mother     Hypertension Mother     Alcohol abuse Father     Arthritis Father     Cancer Father         SKIN    Diabetes Father     Heart disease Father     Hyperlipidemia Father     Hypertension Father     Obesity Father     Arthritis Sister     Cancer Sister         BREAST    Diabetes Sister     Hyperlipidemia Sister     Heart disease Sister     Hypertension Sister     Obesity Sister     Sleep apnea Sister     Arthritis Brother     Cancer Brother         KIDNEY/BRAIN/SKIN    Hyperlipidemia Brother     Kidney disease Brother     Hypertension Brother     Alcohol abuse Brother     Mental illness Brother          schizophrenia    Cancer Sister         COLON    Diabetes Sister             Hyperlipidemia Sister     Hypertension Sister     Diabetes Brother     Heart disease Brother     Hyperlipidemia Brother     Hypertension Brother     Alcohol abuse Brother     Cancer Maternal Grandmother     Cancer Maternal Uncle       Allergies   Allergen Reactions    Promethazine Itching    Vancomycin Anaphylaxis    Calamine-Zinc Oxide Itching and Rash        Home Medications     Prior to Admission medications    Medication Sig Start Date End Date Taking? Authorizing Provider   albuterol sulfate  (90 Base) MCG/ACT inhaler Inhale 2 puffs Every 4 (Four) Hours As Needed for Wheezing. 24   Kianna Loja APRN   amoxicillin-clavulanate (AUGMENTIN) 875-125 MG per tablet Take 1 tablet by mouth Every 12 (Twelve) Hours for 19 doses. Indications: Infection of the Skin and/or Soft Tissue 3/13/25 3/23/25  Maritza Krause MD   apixaban (Eliquis) 5 MG tablet tablet TAKE 1 TABLET BY MOUTH 2 TIMES A DAY 24   Mis Boggs APRN   budesonide-formoterol (Symbicort) 80-4.5 MCG/ACT inhaler Inhale 2 puffs 2 (Two) Times a Day. 23   Mis Boggs APRN   carvedilol (COREG) 25 MG tablet Take 1 tablet by mouth 2 (Two) Times a Day With Meals. 24   Mis Boggs APRN   doxycycline (MONODOX) 100 MG capsule Take 1 capsule by mouth Every 12 (Twelve) Hours for 19 doses. Indications: Infection of the Skin and/or Soft Tissue 3/13/25 3/23/25  Maritza Krause MD   hydrALAZINE (APRESOLINE) 25 MG tablet TAKE 1 TABLET BY MOUTH 3 TIMES A DAY 24   Mis Boggs APRN   hydroCHLOROthiazide 25 MG tablet TAKE 1 TABLET BY MOUTH DAILY 24   Mis Boggs APRN   loperamide (Imodium A-D) 2 MG tablet Take 1 tablet by mouth 4 (Four) Times a Day As Needed for Diarrhea. 24   Kianna Loja APRN   multivitamin (THERAGRAN) tablet tablet Take 1 tablet by mouth Daily. mens    Provider, MD Aron    pantoprazole (PROTONIX) 40 MG EC tablet Take 1 tablet by mouth 2 (Two) Times a Day.    Provider, MD Aron   potassium chloride (MICRO-K) 10 MEQ CR capsule TAKE 1 CAPSULE BY MOUTH DAILY 10/31/24   Mis Boggs APRN   rOPINIRole (REQUIP) 0.25 MG tablet Take 1 tablet by mouth Every Night. Take 1 hour before bedtime.    Provider, MD Aron   sertraline (Zoloft) 25 MG tablet Take 1 tablet by mouth Daily. 3/22/24   Mis Boggs APRN   silver sulfadiazine (SILVADENE, SSD) 1 % cream Apply 1 Application topically to the appropriate area as directed Daily. 9/23/24   Tam Barnett PA-C   torsemide (DEMADEX) 100 MG tablet TAKE 1 TABLET BY MOUTH DAILY 10/28/24   Mis Boggs APRN   vitamin D3 125 MCG (5000 UT) capsule capsule Take 1 capsule by mouth Daily. 8/30/24   Mis Boggs APRN      Objective / Physical Exam   Vital signs:  Temp: 98.1 °F (36.7 °C)  BP: 127/76  Heart Rate: 84  Resp: 16  SpO2: 98 %  Weight: (!) 220 kg (484 lb 2.1 oz)    Admission Weight: Weight: (!) 220 kg (484 lb 2.1 oz)    Physical Exam   Physical Exam  HENT:      Head: Normocephalic and atraumatic.      Nose: Nose normal.   Eyes:      Extraocular Movements: Extraocular movements intact.      Conjunctivae/sclerae: Conjunctivae normal.      Pupils: Pupils are equal, round, and reactive to light.   Cardiovascular:      Rate and Rhythm: Normal       Pulses: Normal pulses.      Heart sounds: Normal heart sounds.   Pulmonary:      Effort: normal      Breath sounds: normal   Abdominal:      General: Abdomen is flat. Bowel sounds are normal.      Palpations: Abdomen is soft.   Musculoskeletal:         General: Normal range of motion.      Cervical back: Normal range of motion and neck supple.   Skin:    There is bilateral LE swelling with chronic skin changes    There is discharges with some blood in RLE, pain on palpation   Neurological:      General: No focal deficit present.      Mental Status: alert.    Psychiatric:         Mood and Affect: Mood normal.        Labs     Results from last 7 days   Lab Units 03/17/25  2243 03/13/25  0148 03/12/25  0221   WBC 10*3/mm3 8.88 7.62 7.80   HEMATOCRIT % 43.0 45.4 42.3   PLATELETS 10*3/mm3 175 195 187      Results from last 7 days   Lab Units 03/17/25 2243 03/13/25  0148 03/12/25  0221   SODIUM mmol/L 138 137 139   POTASSIUM mmol/L 3.5 4.0 4.1   CHLORIDE mmol/L 102 99 100   CO2 mmol/L 26.3 29.5* 27.1   BUN mg/dL 13 12 13   CREATININE mg/dL 0.77 0.92 0.83        Current Medications   Scheduled Meds:Linezolid, 600 mg, Intravenous, Once  sodium chloride, 10 mL, Intravenous, Q12H         Continuous Infusions:      Tory Pandey MD  [facility]   03/18/25   00:37 EDT

## 2025-03-18 NOTE — THERAPY DISCHARGE NOTE
OT completed brief screen. Pt reports being at/near baseline. Educated patient on OT role, goals, and progression of care. Pt reports no current OT needs. Will sign off at this time.

## 2025-03-18 NOTE — CONSULTS
Infectious Diseases Consult Note    Referring Provider: Harlan Cerna DO    Reason for Consultation: Right leg cellulitis    Patient Care Team:  Erma Lizama APRN as PCP - General (Nurse Practitioner)  Penny Rueda APRN as Nurse Practitioner (Nurse Practitioner)  Sameera Bethea RD as Dietitian (Nutrition)    Chief complaint right leg pain, swelling, drainage    Subjective     History of present illness:      This is a 45-year-old male presents to the hospital on 3/17/2025 for worsening right leg pain swelling and drainage.  Patient was recently in the hospital for the same issue.  He continue to take oral antibiotics he was discharged on.  Patient denies fever, chills, diaphoresis, shortness of breath, cough, GI symptoms, or any urinary symptoms.    Review of Systems   Review of Systems   Constitutional: Negative.    HENT: Negative.     Eyes: Negative.    Respiratory: Negative.     Cardiovascular: Negative.  Positive for leg swelling.   Gastrointestinal: Negative.    Endocrine: Negative.    Genitourinary: Negative.    Musculoskeletal: Negative.    Skin: Negative.  Positive for color change and wound.   Neurological: Negative.    Psychiatric/Behavioral: Negative.     All other systems reviewed and are negative.      Medications  (Not in a hospital admission)      History  Past Medical History:   Diagnosis Date    Allergic 1979    seasonal    Arthritis 01/05/2020    legs    Asthma 01/2024    Chronic obstructive pulmonary disease 07/17/2023    COPD (chronic obstructive pulmonary disease) 04/01/2011    GERD (gastroesophageal reflux disease) 06/06/2006    Hypertension 06/06/2006    Kidney stone 08/08/2010    Low back pain 01/01/2020    Obesity 01/21/1997    Pneumonia 11/2023    Renal insufficiency 03/2021    Stroke 01/21/2015 01/21/2016    Substance abuse     Type 2 diabetes mellitus 07/27/2019    Urinary tract infection 03/15/2020     Past Surgical History:   Procedure Laterality Date     APPENDECTOMY  2017    CHOLECYSTECTOMY  2016       Family History  Family History   Problem Relation Age of Onset    Arthritis Mother     COPD Mother     Diabetes Mother     Hyperlipidemia Mother     Hypertension Mother     Alcohol abuse Father     Arthritis Father     Cancer Father         SKIN    Diabetes Father     Heart disease Father     Hyperlipidemia Father     Hypertension Father     Obesity Father     Arthritis Sister     Cancer Sister         BREAST    Diabetes Sister     Hyperlipidemia Sister     Heart disease Sister     Hypertension Sister     Obesity Sister     Sleep apnea Sister     Arthritis Brother     Cancer Brother         KIDNEY/BRAIN/SKIN    Hyperlipidemia Brother     Kidney disease Brother     Hypertension Brother     Alcohol abuse Brother     Mental illness Brother         schizophrenia    Cancer Sister         COLON    Diabetes Sister             Hyperlipidemia Sister     Hypertension Sister     Diabetes Brother     Heart disease Brother     Hyperlipidemia Brother     Hypertension Brother     Alcohol abuse Brother     Cancer Maternal Grandmother     Cancer Maternal Uncle        Social History   reports that he quit smoking about 16 years ago. His smoking use included cigarettes. He started smoking about 27 years ago. He has a 20.8 pack-year smoking history. He has been exposed to tobacco smoke. He has never used smokeless tobacco. He reports that he does not currently use alcohol after a past usage of about 12.0 standard drinks of alcohol per week. He reports current drug use. Frequency: 7.00 times per week. Drug: Marijuana.    Allergies  Promethazine, Vancomycin, and Calamine-zinc oxide    Objective     Vital Signs   Vital Signs (last 24 hours)          0700   0659  0700   1421   Most Recent      Temp (°F) 98.1 -  98.4    97.6 -  98     98 (36.7)  1219    Heart Rate 78 -  91    78 -  82     82  1219    Resp 16 -  18    16 -  18     18  1219     /76 -  163/96    149/95 -  151/88     151/88 03/18 1219    SpO2 (%) 94 -  98    94 -  97     97 03/18 1219            Physical Exam:  Physical Exam  Vitals and nursing note reviewed.   Constitutional:       General: He is not in acute distress.     Appearance: He is well-developed. He is obese. He is ill-appearing. He is not diaphoretic.   HENT:      Head: Normocephalic and atraumatic.   Eyes:      Conjunctiva/sclera: Conjunctivae normal.      Pupils: Pupils are equal, round, and reactive to light.   Cardiovascular:      Rate and Rhythm: Normal rate and regular rhythm.      Heart sounds: Normal heart sounds, S1 normal and S2 normal.   Pulmonary:      Effort: Pulmonary effort is normal. No respiratory distress.      Breath sounds: Normal breath sounds. No stridor. No wheezing or rales.   Abdominal:      General: Bowel sounds are normal. There is no distension.      Palpations: Abdomen is soft. There is no mass.      Tenderness: There is no abdominal tenderness. There is no guarding.   Musculoskeletal:         General: No deformity. Normal range of motion.      Cervical back: Neck supple.      Right lower leg: Edema present.      Left lower leg: Edema present.   Skin:     General: Skin is warm and dry.      Coloration: Skin is not pale.      Findings: No erythema or rash.      Comments: Chronic bilateral venous stasis changes     Superficial wound on the anterior right lower leg with minimal drainage.  No significant streaking above the right knee today     Neurological:      Mental Status: He is alert and oriented to person, place, and time.      Cranial Nerves: No cranial nerve deficit.   Psychiatric:         Mood and Affect: Mood normal.         Microbiology  Microbiology Results (last 10 days)       Procedure Component Value - Date/Time    Blood Culture - Blood, Arm, Left [[id number]]  (Normal) Collected: 03/10/25 1508    Lab Status: Final result Specimen: Blood from Arm, Left Updated: 03/15/25 8870      Blood Culture No growth at 5 days    Blood Culture - Blood, Arm, Right [[id number]]  (Normal) Collected: 03/10/25 1315    Lab Status: Final result Specimen: Blood from Arm, Right Updated: 03/15/25 1330     Blood Culture No growth at 5 days    Wound Culture - Wound, Leg, Right [[id number]]  (Abnormal)  (Susceptibility) Collected: 03/09/25 1012    Lab Status: Final result Specimen: Wound from Leg, Right Updated: 03/12/25 0732     Wound Culture Heavy growth (4+) Staphylococcus aureus, MRSA     Comment:   Methicillin resistant Staphylococcus aureus, Patient may be an isolation risk.         Heavy growth (4+) Enterococcus faecalis      Heavy growth (4+) Normal Skin Barbara     Gram Stain Many (4+) WBCs per low power field      Many (4+) Gram positive cocci      Many (4+) Gram negative bacilli    Susceptibility        Staphylococcus aureus, MRSA      QUINN      Clindamycin Resistant      Erythromycin Resistant      Oxacillin Resistant      Rifampin Susceptible      Tetracycline Susceptible      Trimethoprim + Sulfamethoxazole Susceptible      Vancomycin Susceptible                       Susceptibility        Enterococcus faecalis      QUINN      Ampicillin Susceptible      Vancomycin Susceptible                       Susceptibility Comments       Staphylococcus aureus, MRSA    This isolate is presumed to be clindamycin resistant based on detection of inducible clindamycin resistance.  Clindamycin may still be effective in some patients.                       Laboratory  Results from last 7 days   Lab Units 03/17/25  2243   WBC 10*3/mm3 8.88   HEMOGLOBIN g/dL 13.7   HEMATOCRIT % 43.0   PLATELETS 10*3/mm3 175     Results from last 7 days   Lab Units 03/18/25  1056 03/17/25  2243   SODIUM mmol/L  --  138   POTASSIUM mmol/L 4.0 3.5   CHLORIDE mmol/L  --  102   CO2 mmol/L  --  26.3   BUN mg/dL  --  13   CREATININE mg/dL  --  0.77   GLUCOSE mg/dL  --  93   CALCIUM mg/dL  --  8.7     Results from last 7 days   Lab Units 03/18/25  1056  03/17/25  2243   SODIUM mmol/L  --  138   POTASSIUM mmol/L 4.0 3.5   CHLORIDE mmol/L  --  102   CO2 mmol/L  --  26.3   BUN mg/dL  --  13   CREATININE mg/dL  --  0.77   GLUCOSE mg/dL  --  93   CALCIUM mg/dL  --  8.7     Results from last 7 days   Lab Units 03/17/25  2243   CK TOTAL U/L 49     Results from last 7 days   Lab Units 03/17/25  2243   SED RATE mm/hr 38*           Radiology  Imaging Results (Last 72 Hours)       ** No results found for the last 72 hours. **            Cardiology      Results Review:  I have reviewed all clinical data, test, lab, and imaging results.       Schedule Meds  cefepime, 2,000 mg, Intravenous, Q8H  Linezolid, 600 mg, Intravenous, Q12H  sodium chloride, 10 mL, Intravenous, Q12H        Infusion Meds       PRN Meds    acetaminophen **OR** acetaminophen **OR** acetaminophen    aluminum-magnesium hydroxide-simethicone    senna-docusate sodium **AND** polyethylene glycol **AND** bisacodyl **AND** bisacodyl    Calcium Replacement - Follow Nurse / BPA Driven Protocol    HYDROcodone-acetaminophen    Magnesium Standard Dose Replacement - Follow Nurse / BPA Driven Protocol    melatonin    nitroglycerin    ondansetron ODT **OR** ondansetron    Phosphorus Replacement - Follow Nurse / BPA Driven Protocol    Potassium Replacement - Follow Nurse / BPA Driven Protocol    [COMPLETED] Insert Peripheral IV **AND** sodium chloride    sodium chloride    sodium chloride      Assessment & Plan     Assessment     Right lower extremity cellulitis with open superficial wound with minimal drainage.  Patient states there is worsening drainage despite being on oral antibiotics from previous admission.  Previous admission cultures grew MRSA and Enterococcus faecalis and patient was sent home on p.o. doxycycline p.o. Augmentin.     Chronic bilateral lower extremities venous stasis changes     Morbid obesity with BMI of 65     Borderline diabetes     Plan     Continue IV Zyvox 600 mg every 12 hours  Continue IV  cefepime 2 g every 8 hours  Wound culture  Blood cultures pending  Wound care is following  Keep right leg elevated above heart level is much as possible  Wound care is following  Continue supportive care  A.m. labs    The above note was transcribed for Dr. Carballo-physical exam and review of systems were performed by him      Toña Benavidez, APRN  03/18/25  14:21 EDT    Note is dictated utilizing voice recognition software/Dragon

## 2025-03-18 NOTE — NURSING NOTE
"  rle   Wound Care Instructions maxorb abd pads wrap with cast padding and wrap using 4\" and 6\" ace wrap      "

## 2025-03-18 NOTE — ED PROVIDER NOTES
Subjective     Provider in Triage Note  Due to significant overcrowding in the emergency department patient was initially seen and evaluated in triage.  Provider in triage recommended patient placement in the treatment area to initiate therapy and movement to an ER bed as soon as possible.    Patient presents today with complaints of chills feeling feverish since yesterday general unwell feeling.  He reports increasing drainage and malodor to wound in the right lower leg.  He was just discharged from the hospital here 4 days ago after he was admitted for cellulitis of the right lower leg.  Was doing well on IV antibiotics and sent home on oral antibiotics but now feels like he is getting worse again.      History of Present Illness  I reviewed the provider in triage note and I attest it is the correct HPI.        Review of Systems   Constitutional:  Negative for fever.       Past Medical History:   Diagnosis Date    Allergic 1979    seasonal    Arthritis 01/05/2020    legs    Asthma 01/2024    Chronic obstructive pulmonary disease 07/17/2023    COPD (chronic obstructive pulmonary disease) 04/01/2011    GERD (gastroesophageal reflux disease) 06/06/2006    Hypertension 06/06/2006    Kidney stone 08/08/2010    Low back pain 01/01/2020    Obesity 01/21/1997    Pneumonia 11/2023    Renal insufficiency 03/2021    Stroke 01/21/2015 01/21/2016    Substance abuse     Type 2 diabetes mellitus 07/27/2019    Urinary tract infection 03/15/2020       Allergies   Allergen Reactions    Promethazine Itching    Vancomycin Anaphylaxis    Calamine-Zinc Oxide Itching and Rash       Past Surgical History:   Procedure Laterality Date    APPENDECTOMY  03/2017    CHOLECYSTECTOMY  02/2016       Family History   Problem Relation Age of Onset    Arthritis Mother     COPD Mother     Diabetes Mother     Hyperlipidemia Mother     Hypertension Mother     Alcohol abuse Father     Arthritis Father     Cancer Father         SKIN    Diabetes  Father     Heart disease Father     Hyperlipidemia Father     Hypertension Father     Obesity Father     Arthritis Sister     Cancer Sister         BREAST    Diabetes Sister     Hyperlipidemia Sister     Heart disease Sister     Hypertension Sister     Obesity Sister     Sleep apnea Sister     Arthritis Brother     Cancer Brother         KIDNEY/BRAIN/SKIN    Hyperlipidemia Brother     Kidney disease Brother     Hypertension Brother     Alcohol abuse Brother     Mental illness Brother         schizophrenia    Cancer Sister         COLON    Diabetes Sister             Hyperlipidemia Sister     Hypertension Sister     Diabetes Brother     Heart disease Brother     Hyperlipidemia Brother     Hypertension Brother     Alcohol abuse Brother     Cancer Maternal Grandmother     Cancer Maternal Uncle        Social History     Socioeconomic History    Marital status:    Tobacco Use    Smoking status: Former     Current packs/day: 0.00     Average packs/day: 2.0 packs/day for 10.4 years (20.8 ttl pk-yrs)     Types: Cigarettes     Start date: 1998     Quit date: 2008     Years since quittin.7     Passive exposure: Past    Smokeless tobacco: Never   Vaping Use    Vaping status: Never Used   Substance and Sexual Activity    Alcohol use: Not Currently     Alcohol/week: 12.0 standard drinks of alcohol     Types: 12 Cans of beer per week     Comment: PER DAY    Drug use: Yes     Frequency: 7.0 times per week     Types: Marijuana    Sexual activity: Yes     Partners: Female     Birth control/protection: Condom           Objective   Physical Exam  Vitals and nursing note reviewed.   Constitutional:       General: He is not in acute distress.     Appearance: He is obese. He is ill-appearing and toxic-appearing.   HENT:      Head: Normocephalic and atraumatic.      Nose: No congestion or rhinorrhea.      Mouth/Throat:      Mouth: Mucous membranes are moist.      Pharynx: Oropharynx is clear.   Eyes:       "Extraocular Movements: Extraocular movements intact.      Pupils: Pupils are equal, round, and reactive to light.   Cardiovascular:      Rate and Rhythm: Normal rate and regular rhythm.      Pulses: Normal pulses.      Heart sounds: Normal heart sounds.   Pulmonary:      Effort: Pulmonary effort is normal.      Breath sounds: Normal breath sounds.   Abdominal:      General: Bowel sounds are normal.      Palpations: Abdomen is soft.      Tenderness: There is no abdominal tenderness.   Musculoskeletal:      Cervical back: Normal range of motion and neck supple. No tenderness.      Right lower leg: Edema present.      Left lower leg: Edema present.   Skin:     General: Skin is warm and dry.      Capillary Refill: Capillary refill takes less than 2 seconds.      Coloration: Skin is not jaundiced or pale.      Comments: Right lower leg is warm to touch and discolored red/purple. There is an area approximately 4x4cm where the skin has completely sloughed off and there is capillary bleeding.  Right foot only has +1 pulse and is cool to touch.   Neurological:      General: No focal deficit present.      Mental Status: He is alert. Mental status is at baseline.   Psychiatric:         Mood and Affect: Mood normal.         Thought Content: Thought content normal.         Judgment: Judgment normal.         Procedures           ED Course   /76   Pulse 84   Temp 98.1 °F (36.7 °C) (Oral)   Resp 16   Ht 180.3 cm (71\")   Wt (!) 220 kg (484 lb 2.1 oz)   SpO2 98%   BMI 67.52 kg/m²   Labs Reviewed   SEDIMENTATION RATE - Abnormal; Notable for the following components:       Result Value    Sed Rate 38 (*)     All other components within normal limits   C-REACTIVE PROTEIN - Abnormal; Notable for the following components:    C-Reactive Protein 1.07 (*)     All other components within normal limits   CBC WITH AUTO DIFFERENTIAL - Normal   POC LACTATE - Normal   BLOOD CULTURE   BLOOD CULTURE   COMPREHENSIVE METABOLIC PANEL    " Narrative:     GFR Categories in Chronic Kidney Disease (CKD)      GFR Category          GFR (mL/min/1.73)    Interpretation  G1                     90 or greater         Normal or high (1)  G2                      60-89                Mild decrease (1)  G3a                   45-59                Mild to moderate decrease  G3b                   30-44                Moderate to severe decrease  G4                    15-29                Severe decrease  G5                    14 or less           Kidney failure          (1)In the absence of evidence of kidney disease, neither GFR category G1 or G2 fulfill the criteria for CKD.    eGFR calculation 2021 CKD-EPI creatinine equation, which does not include race as a factor   POC LACTATE   CBC AND DIFFERENTIAL    Narrative:     The following orders were created for panel order CBC & Differential.  Procedure                               Abnormality         Status                     ---------                               -----------         ------                     CBC Auto Differential[[id number]]        Normal              Final result                 Please view results for these tests on the individual orders.   EXTRA TUBES    Narrative:     The following orders were created for panel order Extra Tubes.  Procedure                               Abnormality         Status                     ---------                               -----------         ------                     Gold Top - SST[[id number]]                                   Final result               Light Blue Top[[id number]]                                   Final result                 Please view results for these tests on the individual orders.   GOLD TOP - SST   LIGHT BLUE TOP     Medications   sodium chloride 0.9 % flush 10 mL (has no administration in time range)   Linezolid (ZYVOX) 600 mg 300 mL (600 mg Intravenous New Bag 3/18/25 0011)   sodium chloride 0.9 % flush 10 mL ( Intravenous Canceled  Entry 3/18/25 0045)   sodium chloride 0.9 % flush 10 mL (has no administration in time range)   sodium chloride 0.9 % infusion 40 mL (has no administration in time range)   nitroglycerin (NITROSTAT) SL tablet 0.4 mg (has no administration in time range)   Potassium Replacement - Follow Nurse / BPA Driven Protocol (has no administration in time range)   Magnesium Standard Dose Replacement - Follow Nurse / BPA Driven Protocol (has no administration in time range)   Phosphorus Replacement - Follow Nurse / BPA Driven Protocol (has no administration in time range)   Calcium Replacement - Follow Nurse / BPA Driven Protocol (has no administration in time range)   acetaminophen (TYLENOL) tablet 650 mg (has no administration in time range)     Or   acetaminophen (TYLENOL) 160 MG/5ML oral solution 650 mg (has no administration in time range)     Or   acetaminophen (TYLENOL) suppository 650 mg (has no administration in time range)   sennosides-docusate (PERICOLACE) 8.6-50 MG per tablet 2 tablet (has no administration in time range)     And   polyethylene glycol (MIRALAX) packet 17 g (has no administration in time range)     And   bisacodyl (DULCOLAX) EC tablet 5 mg (has no administration in time range)     And   bisacodyl (DULCOLAX) suppository 10 mg (has no administration in time range)   melatonin tablet 5 mg (has no administration in time range)   ondansetron ODT (ZOFRAN-ODT) disintegrating tablet 4 mg (has no administration in time range)     Or   ondansetron (ZOFRAN) injection 4 mg (has no administration in time range)   aluminum-magnesium hydroxide-simethicone (MAALOX MAX) 400-400-40 MG/5ML suspension 15 mL (has no administration in time range)   Linezolid (ZYVOX) 600 mg 300 mL (has no administration in time range)   cefepime 2000 mg IVPB in 100 mL NS (MBP) (has no administration in time range)   cefepime 2000 mg IVPB in 100 mL NS (MBP) (has no administration in time range)   HYDROcodone-acetaminophen (NORCO) 5-325 MG  "per tablet 1 tablet (has no administration in time range)   sodium chloride 0.9 % bolus 1,000 mL (1,000 mL Intravenous New Bag 3/17/25 2305)   morphine injection 4 mg (4 mg Intravenous Given 3/17/25 2310)   ondansetron (ZOFRAN) injection 4 mg (4 mg Intravenous Given 3/17/25 2310)   piperacillin-tazobactam (ZOSYN) 4.5 g IVPB in 100 mL NS MBP (CD) (4.5 g Intravenous New Bag 3/17/25 2310)     No radiology results for the last day                                                      Medical Decision Making  /76   Pulse 84   Temp 98.1 °F (36.7 °C) (Oral)   Resp 16   Ht 180.3 cm (71\")   Wt (!) 220 kg (484 lb 2.1 oz)   SpO2 98%   BMI 67.52 kg/m²      Chart review: Patient recent stay was reviewed.  Patient's wound cultures were positive for MRSA and enterococcus faecalis.    Patient is 45-year-old male who presents to the ED with complaints of skin infection.  See full HPI above.    Assessment and initial physical exam as noted above.    Patient is placed into ED room and into gown for assessment.  Primary differentials for patient include MRSA exacerbation, sepsis, trauma to skin.    Comorbidities:  has a past medical history of Allergic (1979), Arthritis (01/05/2020), Asthma (01/2024), Chronic obstructive pulmonary disease (07/17/2023), COPD (chronic obstructive pulmonary disease) (04/01/2011), GERD (gastroesophageal reflux disease) (06/06/2006), Hypertension (06/06/2006), Kidney stone (08/08/2010), Low back pain (01/01/2020), Obesity (01/21/1997), Pneumonia (11/2023), Renal insufficiency (03/2021), Stroke (01/21/2015), Substance abuse, Type 2 diabetes mellitus (07/27/2019), and Urinary tract infection (03/15/2020).    Labs remarkable for elevated CRP and sed rate.  Lactate within normal limits at 0.8, CBC with no leukocytosis or cytopenia, and CMP with no electrolyte derangement or kidney injury.    Imaging and EKG considered but not clinically indicated for this patient's presentation at this " time.    Patient and wife updated with results of labs and plan of care.  Repeat physical exam completed at this time; exam unchanged from previous.  Patient and wife informed the plan is to admit to the hospital for IV antibiotics and wound care.  Patient and wife informed that no blood cultures have been obtained and that the current broad-spectrum antibiotics will be narrowed as clinically possible.  Patient and wife verbalized understanding of and support for this plan of care.  Patient will be admitted to the hospitalist service under Dr. Pandey.    Appropriate PPE worn during exam.    I discussed findings with patient who voiced understanding of discharge instructions, signs and symptoms requiring return to ED; discharged improved and in stable condition with follow up for re-evaluation.  This document is intended for medical expert use only. Reading of this document by patients and/or patient's family without participating medical staff guidance may result in misinterpretation and unintended morbidity.  Any interpretation of such data is the responsibility of the patient and/or family member responsible for the patient in concert with their primary or specialist providers, not to be left for sources of online searches such as [place], [employer] or similar queries. Relying on these approaches to knowledge may result in misinterpretation, misguided goals of care and even death should patients or family members try recommendations outside of the realm of professional medical care in a supervised inpatient environment.         Medications given in ED:  sodium chloride 0.9 % flush 10 mL (has no administration in time range)  Linezolid (ZYVOX) 600 mg 300 mL (has no administration in time range)  sodium chloride 0.9 % bolus 1,000 mL (1,000 mL Intravenous New Bag 3/17/25 2308)  morphine injection 4 mg (4 mg Intravenous Given 3/17/25 2310)  ondansetron (ZOFRAN) injection 4 mg (4 mg Intravenous Given 3/17/25  2310)  piperacillin-tazobactam (ZOSYN) 4.5 g IVPB in 100 mL NS MBP (CD) (4.5 g Intravenous New Bag 3/17/25 2310)      Problems Addressed:  Cellulitis of right lower extremity: complicated acute illness or injury  MRSA (methicillin resistant Staphylococcus aureus): complicated acute illness or injury  Skin infection: complicated acute illness or injury    Amount and/or Complexity of Data Reviewed  Labs: ordered.    Risk  Prescription drug management.  Decision regarding hospitalization.        Final diagnoses:   Skin infection   MRSA (methicillin resistant Staphylococcus aureus)   Cellulitis of right lower extremity       ED Disposition  ED Disposition       ED Disposition   Decision to Admit    Condition   --    Comment   Level of Care: Telemetry [5]   Diagnosis: Cellulitis [648618]   Certification: I Certify That Inpatient Hospital Services Are Medically Necessary For Greater Than 2 Midnights                 No follow-up provider specified.       Medication List      No changes were made to your prescriptions during this visit.            Mango Watson PA-C  03/18/25 0105

## 2025-03-18 NOTE — PLAN OF CARE
Problem: Adult Inpatient Plan of Care  Goal: Plan of Care Review  Outcome: Progressing  Flowsheets (Taken 3/18/2025 0519)  Progress: no change  Plan of Care Reviewed With: patient  Goal: Patient-Specific Goal (Individualized)  Outcome: Progressing  Goal: Absence of Hospital-Acquired Illness or Injury  Outcome: Progressing  Goal: Optimal Comfort and Wellbeing  Outcome: Progressing  Goal: Readiness for Transition of Care  Outcome: Progressing  Intervention: Mutually Develop Transition Plan  Recent Flowsheet Documentation  Taken 3/18/2025 0513 by Ana Woodard, RN  Transportation Anticipated: family or friend will provide  Patient/Family Anticipated Services at Transition: none  Patient/Family Anticipates Transition to: home with family  Taken 3/18/2025 0505 by Ana Woodard, RN  Equipment Currently Used at Home: rollator     Problem: Infection  Goal: Absence of Infection Signs and Symptoms  Outcome: Progressing   Goal Outcome Evaluation:  Plan of Care Reviewed With: patient        Progress: no change

## 2025-03-18 NOTE — CASE MANAGEMENT/SOCIAL WORK
Discharge Planning Assessment   [place]     Patient Name: Farhat Hein  MRN: [id number]  Today's Date: 3/18/2025    Admit Date: 3/17/2025    Plan: From home with spouse   Discharge Needs Assessment       Row Name 03/18/25 1146       Living Environment    People in Home spouse;child(americo), dependent    Name(s) of People in Home Spouse Quynh    Current Living Arrangements home    Potentially Unsafe Housing Conditions none    In the past 12 months has the electric, gas, oil, or water company threatened to shut off services in your home? No    Primary Care Provided by self    Provides Primary Care For no one    Family Caregiver if Needed spouse    Family Caregiver Names Spouse Quynh    Quality of Family Relationships helpful;involved;supportive    Able to Return to Prior Arrangements yes       Resource/Environmental Concerns    Resource/Environmental Concerns none    Transportation Concerns none       Transportation Needs    In the past 12 months, has lack of transportation kept you from medical appointments or from getting medications? no    In the past 12 months, has lack of transportation kept you from meetings, work, or from getting things needed for daily living? No       Food Insecurity    Within the past 12 months, you worried that your food would run out before you got the money to buy more. Never true    Within the past 12 months, the food you bought just didn't last and you didn't have money to get more. Never true       Transition Planning    Patient/Family Anticipates Transition to home with family    Patient/Family Anticipated Services at Transition none    Transportation Anticipated car, drives self       Discharge Needs Assessment    Readmission Within the Last 30 Days no previous admission in last 30 days    Equipment Currently Used at Home rollator;bp cuff;glucometer;cane, straight    Concerns to be Addressed no discharge needs identified;denies needs/concerns at this time    Do you want help finding  or keeping work or a job? I do not need or want help    Do you want help with school or training? For example, starting or completing job training or getting a high school diploma, GED or equivalent No    Anticipated Changes Related to Illness none    Equipment Needed After Discharge none    Provided Post Acute Provider List? N/A    Provided Post Acute Provider Quality & Resource List? N/A    Offered/Gave Vendor List no                   Discharge Plan       Row Name 03/18/25 1147       Plan    Plan From home with spouse    Patient/Family in Agreement with Plan yes    Provided Post Acute Provider Quality & Resource List? N/A    Plan Comments CM met with patient at the bedside to review discharge planning. Patient lives at home with spouse Quynh and son, is IADLs and drives. Patient car is here and he plans to drive at d/c. Confirmed PCP, insurance, and pharmacy. Patient accepts [facility]. Patient denies any difficulty affording food, utilities, or medications. Patient is not current with any HHC/PT services. DC Barriers: ID consult pending, IV abx, elevated CRP, blood cultures pending                   Demographic Summary       Row Name 03/18/25 1146       General Information    Admission Type inpatient    Arrived From emergency department    Required Notices Provided Important Message from Medicare    Referral Source admission list    Reason for Consult discharge planning    Preferred Language English       Contact Information    Permission Granted to Share Info With     Contact Information Obtained for                    Functional Status       Row Name 03/18/25 1146       Functional Status    Usual Activity Tolerance good    Current Activity Tolerance moderate       Functional Status, IADL    Medications independent    Meal Preparation independent    Housekeeping independent    Laundry independent    Shopping independent    If for any reason you need help with day-to-day activities such as  bathing, preparing meals, shopping, managing finances, etc., do you get the help you need? I don't need any help       Mental Status    General Appearance WDL WDL       Mental Status Summary    Recent Changes in Mental Status/Cognitive Functioning no changes              Patient Forms       Row Name 03/18/25 1046       Patient Forms    Important Message from Medicare (IMM) Delivered  IMM per Reg 3/18               Karen Reyez RN     [phone]  [email or web address]

## 2025-03-18 NOTE — NURSING NOTE
45-year-old male presented to the hospital with complaints of increasing drainage, fever and chills.  Patient has a right lower extremity wound and was just recently discharged from the hospital.  He has a pending appointment tomorrow with outpatient wound care.  Patient is on blood thinner.  The dressing was removed and the area is covered in a clot the right lower extremity is uncomfortable to touch but no warmth erythema induration.  No odor is noted and there is no purulence there is a heavy amount of bloody exudate and the wound is covered with a clot.  I was able to partially remove this.  The base of the wound is pink and clean but it is still obscured partially with the clot.  I am able to palpate a pedal pulse through the edema.  He has consistent changes that are consistent with venous insufficiency to the lower extremity does have hemosiderin staining.    The leg was cleansed selection was applied.  I applied a calcium alginate dressing ABD pad.  I then applied cast padding.  I applied a Curlex and I applied a 4 inch and a 6 inch Ace wrap wrapping from the base of the toes to just below the knee.    Infectious disease consult is pending however the wound itself appears to be relatively stable.  Patient needs to follow-up with outpatient wound care as it is likely he would benefit from multilayer compression dressings.

## 2025-03-18 NOTE — PLAN OF CARE
Hospitalist same day follow-up.  Patient says pain controlled.  Right leg wrapped today. Continue IV Abx.  Await final C&S and further recs from ID.  Resume home Eliquis and BP meds.  Hold sertraline while on Linezolid.

## 2025-03-18 NOTE — PLAN OF CARE
Patient is up ad yanelis, no concerns with mobility and discharge. Will complete order.

## 2025-03-19 ENCOUNTER — APPOINTMENT (OUTPATIENT)
Dept: CARDIOLOGY | Facility: HOSPITAL | Age: 45
DRG: 603 | End: 2025-03-19
Payer: MEDICARE

## 2025-03-19 LAB
ANION GAP SERPL CALCULATED.3IONS-SCNC: 11.2 MMOL/L (ref 5–15)
BASOPHILS # BLD AUTO: 0.06 10*3/MM3 (ref 0–0.2)
BASOPHILS NFR BLD AUTO: 0.7 % (ref 0–1.5)
BH CV LOWER ARTERIAL LEFT ABI RATIO: 1.44
BH CV LOWER ARTERIAL LEFT DORSALIS PEDIS SYS MAX: 180
BH CV LOWER ARTERIAL LEFT GREAT TOE SYS MAX: NORMAL
BH CV LOWER ARTERIAL LEFT POST TIBIAL SYS MAX: 193
BH CV LOWER ARTERIAL LEFT TBI RATIO: NORMAL
BH CV LOWER ARTERIAL RIGHT ABI RATIO: NORMAL
BH CV LOWER ARTERIAL RIGHT DORSALIS PEDIS SYS MAX: 207
BH CV LOWER ARTERIAL RIGHT GREAT TOE SYS MAX: NORMAL
BH CV LOWER ARTERIAL RIGHT POST TIBIAL SYS MAX: NORMAL
BH CV LOWER ARTERIAL RIGHT TBI RATIO: NORMAL
BUN SERPL-MCNC: 16 MG/DL (ref 6–20)
BUN/CREAT SERPL: 16.7 (ref 7–25)
CALCIUM SPEC-SCNC: 9.1 MG/DL (ref 8.6–10.5)
CHLORIDE SERPL-SCNC: 98 MMOL/L (ref 98–107)
CO2 SERPL-SCNC: 28.8 MMOL/L (ref 22–29)
CREAT SERPL-MCNC: 0.96 MG/DL (ref 0.76–1.27)
DEPRECATED RDW RBC AUTO: 47 FL (ref 37–54)
EGFRCR SERPLBLD CKD-EPI 2021: 98.7 ML/MIN/1.73
EOSINOPHIL # BLD AUTO: 0.35 10*3/MM3 (ref 0–0.4)
EOSINOPHIL NFR BLD AUTO: 4.4 % (ref 0.3–6.2)
ERYTHROCYTE [DISTWIDTH] IN BLOOD BY AUTOMATED COUNT: 14.2 % (ref 12.3–15.4)
GLUCOSE BLDC GLUCOMTR-MCNC: 117 MG/DL (ref 70–105)
GLUCOSE SERPL-MCNC: 130 MG/DL (ref 65–99)
HCT VFR BLD AUTO: 46.4 % (ref 37.5–51)
HGB BLD-MCNC: 14.4 G/DL (ref 13–17.7)
IMM GRANULOCYTES # BLD AUTO: 0.03 10*3/MM3 (ref 0–0.05)
IMM GRANULOCYTES NFR BLD AUTO: 0.4 % (ref 0–0.5)
LYMPHOCYTES # BLD AUTO: 2.27 10*3/MM3 (ref 0.7–3.1)
LYMPHOCYTES NFR BLD AUTO: 28.3 % (ref 19.6–45.3)
MAGNESIUM SERPL-MCNC: 1.7 MG/DL (ref 1.6–2.6)
MCH RBC QN AUTO: 28 PG (ref 26.6–33)
MCHC RBC AUTO-ENTMCNC: 31 G/DL (ref 31.5–35.7)
MCV RBC AUTO: 90.3 FL (ref 79–97)
MONOCYTES # BLD AUTO: 0.64 10*3/MM3 (ref 0.1–0.9)
MONOCYTES NFR BLD AUTO: 8 % (ref 5–12)
NEUTROPHILS NFR BLD AUTO: 4.66 10*3/MM3 (ref 1.7–7)
NEUTROPHILS NFR BLD AUTO: 58.2 % (ref 42.7–76)
NRBC BLD AUTO-RTO: 0 /100 WBC (ref 0–0.2)
PHOSPHATE SERPL-MCNC: 3.9 MG/DL (ref 2.5–4.5)
PLATELET # BLD AUTO: 199 10*3/MM3 (ref 140–450)
PMV BLD AUTO: 11.5 FL (ref 6–12)
POTASSIUM SERPL-SCNC: 3.5 MMOL/L (ref 3.5–5.2)
RBC # BLD AUTO: 5.14 10*6/MM3 (ref 4.14–5.8)
SODIUM SERPL-SCNC: 138 MMOL/L (ref 136–145)
UPPER ARTERIAL LEFT ARM BRACHIAL SYS MAX: 132
UPPER ARTERIAL RIGHT ARM BRACHIAL SYS MAX: 134
WBC NRBC COR # BLD AUTO: 8.01 10*3/MM3 (ref 3.4–10.8)

## 2025-03-19 PROCEDURE — 94664 DEMO&/EVAL PT USE INHALER: CPT

## 2025-03-19 PROCEDURE — 25010000002 LINEZOLID 600 MG/300ML SOLUTION: Performed by: INTERNAL MEDICINE

## 2025-03-19 PROCEDURE — 94799 UNLISTED PULMONARY SVC/PX: CPT

## 2025-03-19 PROCEDURE — 93922 UPR/L XTREMITY ART 2 LEVELS: CPT | Performed by: SURGERY

## 2025-03-19 PROCEDURE — 80048 BASIC METABOLIC PNL TOTAL CA: CPT | Performed by: INTERNAL MEDICINE

## 2025-03-19 PROCEDURE — 83735 ASSAY OF MAGNESIUM: CPT | Performed by: INTERNAL MEDICINE

## 2025-03-19 PROCEDURE — C1751 CATH, INF, PER/CENT/MIDLINE: HCPCS

## 2025-03-19 PROCEDURE — 82948 REAGENT STRIP/BLOOD GLUCOSE: CPT

## 2025-03-19 PROCEDURE — 94761 N-INVAS EAR/PLS OXIMETRY MLT: CPT

## 2025-03-19 PROCEDURE — 93922 UPR/L XTREMITY ART 2 LEVELS: CPT

## 2025-03-19 PROCEDURE — 85025 COMPLETE CBC W/AUTO DIFF WBC: CPT | Performed by: INTERNAL MEDICINE

## 2025-03-19 PROCEDURE — 25010000002 CEFEPIME PER 500 MG: Performed by: INTERNAL MEDICINE

## 2025-03-19 PROCEDURE — 36410 VNPNXR 3YR/> PHY/QHP DX/THER: CPT

## 2025-03-19 PROCEDURE — 25010000002 LIDOCAINE 1 % SOLUTION: Performed by: STUDENT IN AN ORGANIZED HEALTH CARE EDUCATION/TRAINING PROGRAM

## 2025-03-19 PROCEDURE — 36415 COLL VENOUS BLD VENIPUNCTURE: CPT | Performed by: INTERNAL MEDICINE

## 2025-03-19 PROCEDURE — 84100 ASSAY OF PHOSPHORUS: CPT | Performed by: INTERNAL MEDICINE

## 2025-03-19 RX ORDER — SODIUM CHLORIDE 0.9 % (FLUSH) 0.9 %
10 SYRINGE (ML) INJECTION AS NEEDED
Status: DISCONTINUED | OUTPATIENT
Start: 2025-03-19 | End: 2025-03-20 | Stop reason: HOSPADM

## 2025-03-19 RX ORDER — LIDOCAINE HYDROCHLORIDE 10 MG/ML
5 INJECTION, SOLUTION INFILTRATION; PERINEURAL ONCE
Status: COMPLETED | OUTPATIENT
Start: 2025-03-19 | End: 2025-03-19

## 2025-03-19 RX ORDER — SODIUM CHLORIDE 9 MG/ML
40 INJECTION, SOLUTION INTRAVENOUS AS NEEDED
Status: DISCONTINUED | OUTPATIENT
Start: 2025-03-19 | End: 2025-03-20 | Stop reason: HOSPADM

## 2025-03-19 RX ORDER — SODIUM CHLORIDE 0.9 % (FLUSH) 0.9 %
10 SYRINGE (ML) INJECTION EVERY 12 HOURS SCHEDULED
Status: DISCONTINUED | OUTPATIENT
Start: 2025-03-19 | End: 2025-03-20 | Stop reason: HOSPADM

## 2025-03-19 RX ADMIN — Medication 10 ML: at 21:03

## 2025-03-19 RX ADMIN — HYDROCODONE BITARTRATE AND ACETAMINOPHEN 1 TABLET: 5; 325 TABLET ORAL at 16:54

## 2025-03-19 RX ADMIN — CARVEDILOL 25 MG: 25 TABLET, FILM COATED ORAL at 09:42

## 2025-03-19 RX ADMIN — HYDROCODONE BITARTRATE AND ACETAMINOPHEN 1 TABLET: 5; 325 TABLET ORAL at 02:48

## 2025-03-19 RX ADMIN — APIXABAN 5 MG: 5 TABLET, FILM COATED ORAL at 21:03

## 2025-03-19 RX ADMIN — LINEZOLID 600 MG: 600 INJECTION, SOLUTION INTRAVENOUS at 13:56

## 2025-03-19 RX ADMIN — Medication 10 ML: at 09:43

## 2025-03-19 RX ADMIN — ROPINIROLE HYDROCHLORIDE 0.25 MG: 0.25 TABLET, FILM COATED ORAL at 21:03

## 2025-03-19 RX ADMIN — PANTOPRAZOLE SODIUM 40 MG: 40 TABLET, DELAYED RELEASE ORAL at 09:41

## 2025-03-19 RX ADMIN — HYDRALAZINE HYDROCHLORIDE 25 MG: 25 TABLET ORAL at 09:41

## 2025-03-19 RX ADMIN — PANTOPRAZOLE SODIUM 40 MG: 40 TABLET, DELAYED RELEASE ORAL at 21:03

## 2025-03-19 RX ADMIN — CEFEPIME 2000 MG: 2 INJECTION, POWDER, FOR SOLUTION INTRAVENOUS at 13:06

## 2025-03-19 RX ADMIN — CARVEDILOL 25 MG: 25 TABLET, FILM COATED ORAL at 16:54

## 2025-03-19 RX ADMIN — LIDOCAINE HYDROCHLORIDE 5 ML: 10 INJECTION, SOLUTION INFILTRATION; PERINEURAL at 14:26

## 2025-03-19 RX ADMIN — HYDRALAZINE HYDROCHLORIDE 25 MG: 25 TABLET ORAL at 16:54

## 2025-03-19 RX ADMIN — BUDESONIDE AND FORMOTEROL FUMARATE DIHYDRATE 2 PUFF: 160; 4.5 AEROSOL RESPIRATORY (INHALATION) at 11:28

## 2025-03-19 RX ADMIN — HYDROCHLOROTHIAZIDE 25 MG: 25 TABLET ORAL at 09:42

## 2025-03-19 RX ADMIN — HYDRALAZINE HYDROCHLORIDE 25 MG: 25 TABLET ORAL at 21:03

## 2025-03-19 RX ADMIN — Medication 10 ML: at 13:58

## 2025-03-19 RX ADMIN — CEFEPIME 2000 MG: 2 INJECTION, POWDER, FOR SOLUTION INTRAVENOUS at 00:20

## 2025-03-19 RX ADMIN — HYDROCODONE BITARTRATE AND ACETAMINOPHEN 1 TABLET: 5; 325 TABLET ORAL at 09:41

## 2025-03-19 RX ADMIN — APIXABAN 5 MG: 5 TABLET, FILM COATED ORAL at 09:42

## 2025-03-19 RX ADMIN — BUDESONIDE AND FORMOTEROL FUMARATE DIHYDRATE 2 PUFF: 160; 4.5 AEROSOL RESPIRATORY (INHALATION) at 20:40

## 2025-03-19 RX ADMIN — TORSEMIDE 100 MG: 100 TABLET ORAL at 09:42

## 2025-03-19 NOTE — CONSULTS
Midline Line Insertion Procedure Note    Procedure: Insertion of #18G/10CM PowerMidline    Indications:  Poor Access    Procedure Details:   Sterile technique was used including antiseptics, cap, gloves, gown, hand hygiene, mask, and sheet.    #18G/10CM PowerMidline inserted to the L Cephalic vein per hospital protocol by Brandon Guillaume RN.     Non-pulsatile blood return: yes    Ultrasound Guidance: Yes    Lot #: [id number]  Expiration date: 2025-11-30    Complications:  none    Findings:  Catheter inserted to 10 cm, with 0 cm exposed.   Mid upper arm circumference is 44 cm.  Catheter was flushed with 20 cc NS and sterile dressing applied.   Patient tolerated procedure well.    Recommendations:  Verbal and/or written Care/Maintenance instructions provided to patient.   Primary nurse notified that midline is okay to use at this time.     Tam Guillaume RN  03/19/25  13:22 EDT

## 2025-03-19 NOTE — SIGNIFICANT NOTE
03/19/25 1319   Readmission Indications   Is the patient and/or family able to complete the readmission assessment questions? Yes   Is this hospitalization related to the prior hospital diagnosis? Yes   Recommendation for rehospitalization   Did you speak with your physician prior to coming to the hospital No   Follow-up Appointments   Do you have a PCP? Yes   Did you have an appointment with PCP after your hospitalization? Yes  (Mis Boggs)   When was your appointment scheduled? 03/17/25   Did you go to appointment? No  (Hospitalized)   Did you have an appointment with a Specialist? Yes  (Dr Hernandez)   When was your appointment scheduled? 03/17/25   Did you go to appointment? No  (Hospitalized)   Are you current with the Pulmonary Clinic? No   Are you current with the CHF Clinic? No   Medications   Did you have newly prescribed medications at discharge? Yes  (Amoxicillin and Doxycycline)   Did you understand the reasons for your medications at discharge and how to take them? Yes   Did you understand the side effects of your medications? Yes   Are you taking all of you prescribed medications? Yes   [facility] was [facility] to fill prescription[facility]   Were medications picked up? Yes   Discharge Instructions   Did you understand your discharge instructions? Yes   Did your family/caregiver hear your instructions? Yes   Were you told to eat a special diet? Yes   Did you adhere to the diet? Yes   Were you given a number of someone to call if you had questions or concerns? Yes   Index discharge location/services   Where did you go upon discharge? Home   Do you have supportive family or friends in the home? Yes  (Wife)   What services were arranged at discharge? Other (comment)  (Outpatient wound care)   Discharge Readiness   On a scale of 1-5 (5 being well prepared), how ready were you for discharge 5   Palliative Care/Hospice   Are you current with Palliative Care? No   Are you current with Hospice  Care? No   Advance Directives (For Healthcare)   Pre-existing AND/MOST/POLST Order No   Advance Directive Status Patient does not have advance directive   Have you reviewed your Advance Directive and is it valid for this stay? Not applicable   Literature Provided on Advance Directives No   Patient Requests Assistance on Advance Directives Patient Declined   Readmission Assessment Final Comments   Final Comments Previous: Right lower extremity cellulitis. ID and wound care consult. IV antibiotics. Would cultures growing MRSA and enterococcus. Discharged with amoxicillin and doxycycline. Referral to Ambulatory wound care with appointment set up for 3/20. Current: Worsening right lower extremity cellulitis with complaints of fever and chills. Patient currently taking po antibiotics. IV zyvox and Cefepime started. Wound and blood cultures. ID consult.  Possible need for outpatient IV antibiotics. LACE 14.

## 2025-03-19 NOTE — PROGRESS NOTES
[facility] MEDICINE SERVICE  DAILY PROGRESS NOTE    NAME: Farhat Hein  : 1980  MRN: [id number]      LOS: 1 day     PROVIDER OF SERVICE: Hayden Arana MD    Chief Complaint: Cellulitis    Subjective:     Interval History:  History taken from: patient    Improving.        Review of Systems:   Review of Systems    Objective:     Vital Signs  Temp:  [97.7 °F (36.5 °C)-98.1 °F (36.7 °C)] 97.7 °F (36.5 °C)  Heart Rate:  [79-82] 79  Resp:  [16-20] 20  BP: (119-167)/() 120/85   Body mass index is 67.37 kg/m².    Physical Exam  Physical Exam  Constitutional:       Appearance: Normal appearance.   Cardiovascular:      Rate and Rhythm: Normal rate and regular rhythm.      Pulses: Normal pulses.      Heart sounds: Normal heart sounds.   Pulmonary:      Effort: Pulmonary effort is normal.      Breath sounds: Normal breath sounds.   Neurological:      General: No focal deficit present.      Mental Status: He is alert and oriented to person, place, and time.            Diagnostic Data    Results from last 7 days   Lab Units 25  0339 25  0031 25  1056 25  2243   WBC 10*3/mm3  --  8.01  --  8.88   HEMOGLOBIN g/dL  --  14.4  --  13.7   HEMATOCRIT %  --  46.4  --  43.0   PLATELETS 10*3/mm3  --  199  --  175   GLUCOSE mg/dL 130*  --   --  93   CREATININE mg/dL 0.96  --   --  0.77   BUN mg/dL 16  --   --  13   SODIUM mmol/L 138  --   --  138   POTASSIUM mmol/L 3.5  --    < > 3.5   AST (SGOT) U/L  --   --   --  28   ALT (SGPT) U/L  --   --   --  30   ALK PHOS U/L  --   --   --  82   BILIRUBIN mg/dL  --   --   --  0.7   ANION GAP mmol/L 11.2  --   --  9.7    < > = values in this interval not displayed.       No radiology results for the last day      I reviewed the patient's new clinical results.    Assessment/Plan:     Active and Resolved Problems  Active Hospital Problems    Diagnosis  POA    **Cellulitis [L03.90]  Yes      Resolved Hospital Problems   No resolved problems to display.        #Right lower extremity cellulitis  -Presented with more swelling and drainage in the right lower extremity.  Recently discharged from hospital oral medication  -Wound culture on March 9 with Enterococcus faecalis and MRSA  -zyvox and  cefepime recommended by ID  -wound culture blood culture reviewed.  - improving symptoms per patient    VTE Prophylaxis:  Pharmacologic & mechanical VTE prophylaxis orders are present.             Disposition Planning:     Barriers to Discharge:wound care and ID care  Anticipated Date of Discharge: 3/21/2025  Place of Discharge: home      Time: 34 minutes     Code Status and Medical Interventions: CPR (Attempt to Resuscitate); Full Support   Ordered at: 03/18/25 0036     Code Status (Patient has no pulse and is not breathing):    CPR (Attempt to Resuscitate)     Medical Interventions (Patient has pulse or is breathing):    Full Support       Signature: Electronically signed by Hayden Arana MD, 03/19/25, 12:36 EDT.  [facility] [place] Hospitalist Team

## 2025-03-19 NOTE — PHARMACY PATIENT ASSISTANCE
Transitions of Care (test claim):    Drug Linezolid:  600 mg PO BID x 10 days   Covered/PA Required: Covered without PA  Copay Per Month:   Is the medication eligible for a trial card/copay card? No        Please note that when it states medication is eligible for a trail card that this only means that the medication has a free trial available. This does not assess if the patient has already utilized the once in a lifetime free trial.     This test claim coverage is only valid on the date the note is published. Copay/coverage could vary depending on patient coverage at a later date.  Additionally this test claim is for the sole purpose of a price check not a clinical recommendation.     For billing questions please call [facility] Pharmacist at ext: [phone]  For M2B questions please call [facility] at ext: [phone]        Epifanio Durant PharmD, BCPS

## 2025-03-19 NOTE — PLAN OF CARE
Problem: Adult Inpatient Plan of Care  Goal: Plan of Care Review  Outcome: Progressing  Goal: Patient-Specific Goal (Individualized)  Outcome: Progressing  Goal: Absence of Hospital-Acquired Illness or Injury  Outcome: Progressing  Intervention: Identify and Manage Fall Risk  Recent Flowsheet Documentation  Taken 3/18/2025 1958 by Zehra Escobar RN  Safety Promotion/Fall Prevention:   activity supervised   assistive device/personal items within reach   clutter free environment maintained   fall prevention program maintained   nonskid shoes/slippers when out of bed   room organization consistent   safety round/check completed  Intervention: Prevent Skin Injury  Recent Flowsheet Documentation  Taken 3/18/2025 1958 by Zehra Escobar RN  Body Position:   position changed independently   supine   weight shifting  Skin Protection: incontinence pads utilized  Intervention: Prevent and Manage VTE (Venous Thromboembolism) Risk  Recent Flowsheet Documentation  Taken 3/18/2025 1958 by Zehra Escobar RN  VTE Prevention/Management: (wounds to LASHELL lower legs)   left   SCDs (sequential compression devices) off   patient refused intervention  Intervention: Prevent Infection  Recent Flowsheet Documentation  Taken 3/18/2025 1958 by Zehra Escobar RN  Infection Prevention:   environmental surveillance performed   equipment surfaces disinfected   hand hygiene promoted   personal protective equipment utilized  Goal: Optimal Comfort and Wellbeing  Outcome: Progressing  Intervention: Provide Person-Centered Care  Recent Flowsheet Documentation  Taken 3/18/2025 1958 by Zehra Escobar RN  Trust Relationship/Rapport:   care explained   emotional support provided   choices provided   empathic listening provided   questions answered   questions encouraged   reassurance provided   thoughts/feelings acknowledged  Goal: Readiness for Transition of Care  Outcome: Progressing     Problem: Infection  Goal: Absence of Infection Signs and  Symptoms  Outcome: Progressing  Intervention: Prevent or Manage Infection  Recent Flowsheet Documentation  Taken 3/18/2025 1958 by Zehra Escobar RN  Infection Management: aseptic technique maintained  Isolation Precautions:   precautions maintained   droplet     Problem: Comorbidity Management  Goal: Blood Glucose Level Within Target Range  Outcome: Progressing  Intervention: Monitor and Manage Glycemia  Recent Flowsheet Documentation  Taken 3/18/2025 1958 by Zehra Escobar RN  Medication Review/Management: medications reviewed  Goal: Blood Pressure in Desired Range  Outcome: Progressing  Intervention: Maintain Blood Pressure Management  Recent Flowsheet Documentation  Taken 3/18/2025 1958 by Zehra Escobar RN  Medication Review/Management: medications reviewed     Problem: Fall Injury Risk  Goal: Absence of Fall and Fall-Related Injury  Outcome: Progressing  Intervention: Identify and Manage Contributors  Recent Flowsheet Documentation  Taken 3/18/2025 1958 by Zehra Escobar RN  Medication Review/Management: medications reviewed  Intervention: Promote Injury-Free Environment  Recent Flowsheet Documentation  Taken 3/18/2025 1958 by Zehra Escobar RN  Safety Promotion/Fall Prevention:   activity supervised   assistive device/personal items within reach   clutter free environment maintained   fall prevention program maintained   nonskid shoes/slippers when out of bed   room organization consistent   safety round/check completed     Problem: Pain Acute  Goal: Optimal Pain Control and Function  Outcome: Progressing  Intervention: Optimize Psychosocial Wellbeing  Recent Flowsheet Documentation  Taken 3/18/2025 1958 by Zehra Escobar RN  Diversional Activities: television  Intervention: Prevent or Manage Pain  Recent Flowsheet Documentation  Taken 3/18/2025 1958 by Zehra Escobar RN  Medication Review/Management: medications reviewed   Goal Outcome Evaluation:      Patient a/ox4, LASHELL legs wrapped by wound care,  reviewed plan of care with patient, up ad yanelis to bathroom, call light in reach, clean and dry,       IV Cefepime   IV Linezoid   ID consult called   Pain control   Pt/ot to eval and treat  emperatriz for admission: Cellulitis of BLE; open wound of right shin   Significant PMH: asthma COPD GERD CKD type 2 diabetes mellitus morbid obesity, restless leg syndrome, DVT, factor V Leyden   Significant 24 hour events: up ad yanelis, lashell legs wrapped by wound care  Significant Assessment Findings: open wound of right lower leg   Social: lives at home with spouse   Additional Info: MRSA LASHELL leg wounds, contact precautions  Mobility Plan: Ad y[facility]elis

## 2025-03-19 NOTE — OUTREACH NOTE
Medical Week 2 Survey      Flowsheet Row Responses   [facility] facility patient discharged from? [place]   Does the patient have one of the following disease processes/diagnoses(primary or secondary)? Other   Week 2 attempt successful? No   Unsuccessful attempts Attempt 1   Revoke Readmitted   Revoke Readmitted            DANGELO HALL - Registered Nurse

## 2025-03-19 NOTE — PLAN OF CARE
Problem: Adult Inpatient Plan of Care  Goal: Plan of Care Review  3/18/2025 2232 by Zehra Escobar RN  Outcome: Progressing  Flowsheets (Taken 3/18/2025 2232)  Plan of Care Reviewed With: patient  3/18/2025 2045 by Zehra Escobar RN  Outcome: Progressing  Goal: Patient-Specific Goal (Individualized)  3/18/2025 2232 by Zehra Escobar RN  Outcome: Progressing  3/18/2025 2045 by Zehra Escobar RN  Outcome: Progressing  Goal: Absence of Hospital-Acquired Illness or Injury  3/18/2025 2232 by Zehra Escobar RN  Outcome: Progressing  3/18/2025 2045 by Zehra Escobar RN  Outcome: Progressing  Intervention: Identify and Manage Fall Risk  Recent Flowsheet Documentation  Taken 3/18/2025 2200 by Zehra Escobar RN  Safety Promotion/Fall Prevention:   activity supervised   assistive device/personal items within reach   clutter free environment maintained   fall prevention program maintained   nonskid shoes/slippers when out of bed   room organization consistent   safety round/check completed  Taken 3/18/2025 1958 by Zehra Escobar RN  Safety Promotion/Fall Prevention:   activity supervised   assistive device/personal items within reach   clutter free environment maintained   fall prevention program maintained   nonskid shoes/slippers when out of bed   room organization consistent   safety round/check completed  Intervention: Prevent Skin Injury  Recent Flowsheet Documentation  Taken 3/18/2025 2200 by Zehra Escobar RN  Body Position:   position changed independently   weight shifting  Taken 3/18/2025 1958 by Zehra Escobar RN  Body Position:   position changed independently   supine   weight shifting  Skin Protection: incontinence pads utilized  Intervention: Prevent and Manage VTE (Venous Thromboembolism) Risk  Recent Flowsheet Documentation  Taken 3/18/2025 1958 by Zehra Escobar RN  VTE Prevention/Management: (wounds to LASHELL lower legs)   left   SCDs (sequential compression devices) off   patient refused  intervention  Intervention: Prevent Infection  Recent Flowsheet Documentation  Taken 3/18/2025 2200 by Zehra Escobar RN  Infection Prevention:   equipment surfaces disinfected   environmental surveillance performed   hand hygiene promoted   personal protective equipment utilized  Taken 3/18/2025 1958 by Zehra Escobar RN  Infection Prevention:   environmental surveillance performed   equipment surfaces disinfected   hand hygiene promoted   personal protective equipment utilized  Goal: Optimal Comfort and Wellbeing  3/18/2025 2232 by Zehra Escobar RN  Outcome: Progressing  3/18/2025 2045 by Zehra Escobar RN  Outcome: Progressing  Intervention: Provide Person-Centered Care  Recent Flowsheet Documentation  Taken 3/18/2025 1958 by Zehra Escobar RN  Trust Relationship/Rapport:   care explained   emotional support provided   choices provided   empathic listening provided   questions answered   questions encouraged   reassurance provided   thoughts/feelings acknowledged  Goal: Readiness for Transition of Care  3/18/2025 2232 by Zehra Escobar RN  Outcome: Progressing  3/18/2025 2045 by Zehra Escobar RN  Outcome: Progressing     Problem: Infection  Goal: Absence of Infection Signs and Symptoms  3/18/2025 2232 by Zehra Escobar RN  Outcome: Progressing  3/18/2025 2045 by Zehra Escobar RN  Outcome: Progressing  Intervention: Prevent or Manage Infection  Recent Flowsheet Documentation  Taken 3/18/2025 2200 by Zehra Escobar RN  Infection Management: aseptic technique maintained  Isolation Precautions:   precautions maintained   contact  Taken 3/18/2025 1958 by Zehra Escobar RN  Infection Management: aseptic technique maintained  Isolation Precautions:   precautions maintained   contact     Problem: Comorbidity Management  Goal: Blood Glucose Level Within Target Range  3/18/2025 2232 by Zehra Escobar RN  Outcome: Progressing  3/18/2025 2045 by Zehra Escobar RN  Outcome: Progressing  Intervention: Monitor  and Manage Glycemia  Recent Flowsheet Documentation  Taken 3/18/2025 2200 by Zehra Escobar RN  Medication Review/Management: medications reviewed  Taken 3/18/2025 1958 by Zehra Escobar RN  Medication Review/Management: medications reviewed  Goal: Blood Pressure in Desired Range  3/18/2025 2232 by Zehra Escobar RN  Outcome: Progressing  3/18/2025 2045 by Zehra Escobar RN  Outcome: Progressing  Intervention: Maintain Blood Pressure Management  Recent Flowsheet Documentation  Taken 3/18/2025 2200 by Zehra Escobar RN  Medication Review/Management: medications reviewed  Taken 3/18/2025 1958 by Zehra Escobar RN  Medication Review/Management: medications reviewed     Problem: Fall Injury Risk  Goal: Absence of Fall and Fall-Related Injury  3/18/2025 2232 by Zehra Escobar RN  Outcome: Progressing  3/18/2025 2045 by Zehra Escobar RN  Outcome: Progressing  Intervention: Identify and Manage Contributors  Recent Flowsheet Documentation  Taken 3/18/2025 2200 by Zehra Escobar RN  Medication Review/Management: medications reviewed  Self-Care Promotion:   independence encouraged   BADL personal objects within reach  Taken 3/18/2025 1958 by Zehra Escobar RN  Medication Review/Management: medications reviewed  Self-Care Promotion:   independence encouraged   BADL personal objects within reach  Intervention: Promote Injury-Free Environment  Recent Flowsheet Documentation  Taken 3/18/2025 2200 by Zehra Escobar RN  Safety Promotion/Fall Prevention:   activity supervised   assistive device/personal items within reach   clutter free environment maintained   fall prevention program maintained   nonskid shoes/slippers when out of bed   room organization consistent   safety round/check completed  Taken 3/18/2025 1958 by Zehra Escobar, RN  Safety Promotion/Fall Prevention:   activity supervised   assistive device/personal items within reach   clutter free environment maintained   fall prevention program maintained    nonskid shoes/slippers when out of bed   room organization consistent   safety round/check completed     Problem: Pain Acute  Goal: Optimal Pain Control and Function  3/18/2025 2232 by Zehra Escobar RN  Outcome: Progressing  3/18/2025 2045 by Zehra Escobar, RN  Outcome: Progressing  Intervention: Optimize Psychosocial Wellbeing  Recent Flowsheet Documentation  Taken 3/18/2025 1958 by Zehra Escobar, RN  Diversional Activities: television  Intervention: Prevent or Manage Pain  Recent Flowsheet Documentation  Taken 3/18/2025 2200 by Zehra Escobar, RN  Medication Review/Management: medications reviewed  Taken 3/18/2025 1958 by Zehra Escobar, RN  Medication Review/Management: medications reviewed   Goal Outcome Evaluation:  Plan of Care Reviewed With: patient   Patient a/ox4, up to toilet independently, LASHELL lower legs wrapped by wound care, patient in contact precautions for MRSA of LASHELL leg wounds, reviewed plan of care with patient, call light in reach, clean and dry.       IV Cefepime   IV Linezoid   ID consult called   Pain control        Reason for admission: Cellulitis of BLE; open wound of right shin   Significant PMH: asthma COPD GERD CKD type 2 diabetes mellitus morbid obesity, restless leg syndrome, DVT, factor V Leyden   Significant 24 hour events: up ad yanelis, lashell legs wrapped by wound care  Significant Assessment Findings: open wound of right lower leg   Social: lives at home with spouse   Additional Info: MRSA BLE wound  Mobility Plan: [facility]

## 2025-03-19 NOTE — CASE MANAGEMENT/SOCIAL WORK
Continued Stay Note  [facility]     Patient Name: Farhat Hein  MRN: [id number]  Today's Date: 3/19/2025    Admit Date: 3/17/2025    Plan: From home with spouse. Possible IV abx (prefer HH w/Infusion) Current with wound care outpatient.   Discharge Plan       Row Name 03/19/25 1322       Plan    Plan From home with spouse. Possible IV abx (prefer HH w/Infusion) Current with wound care outpatient.    Patient/Family in Agreement with Plan yes    Plan Comments CM met with patient at bedside to discuss RA questions. Patient expressed needing IV abx possibly outpatient we discussed his options for HH w/infusion company or coming to ambulatory. Patient said if he needs outpatient abx he would prefer to do them at home. He was set up to come to wound care on 3/20 he called them and informed he was in the hospital. Plans to resume care with them at discharge. DC Barriers: IV cefepime, IV zyvox, cultures pending, wound care, ID following awaiting their abx duration.             Enid Chiu RN     Office: [phone]

## 2025-03-19 NOTE — PROGRESS NOTES
Infectious Diseases Progress Note      LOS: 1 day   Patient Care Team:  Penny Rueda APRN as Nurse Practitioner (Nurse Practitioner)  Sameera Bethea RD as Dietitian (Nutrition)    Chief Complaint: Right leg pain swelling and drainage    Subjective       The patient has been afebrile for the last 24 hours.  The patient is on room air, hemodynamically stable, and is tolerating antimicrobial therapy.    Review of Systems:   Review of Systems   Constitutional: Negative.    HENT: Negative.     Eyes: Negative.    Respiratory: Negative.     Cardiovascular: Negative.  Positive for leg swelling.   Gastrointestinal: Negative.    Endocrine: Negative.    Genitourinary: Negative.    Musculoskeletal: Negative.    Skin: Negative.  Positive for color change and wound.   Neurological: Negative.    Psychiatric/Behavioral: Negative.     All other systems reviewed and are negative.       Objective     Vital Signs  Temp:  [97.7 °F (36.5 °C)-98.1 °F (36.7 °C)] 97.8 °F (36.6 °C)  Heart Rate:  [74-81] 74  Resp:  [16-20] 16  BP: (115-167)/() 133/96    Physical Exam:  Physical Exam  Vitals and nursing note reviewed.   Constitutional:       General: He is not in acute distress.     Appearance: Normal appearance. He is well-developed. He is obese. He is not diaphoretic.   HENT:      Head: Normocephalic and atraumatic.   Eyes:      Conjunctiva/sclera: Conjunctivae normal.      Pupils: Pupils are equal, round, and reactive to light.   Cardiovascular:      Rate and Rhythm: Normal rate and regular rhythm.      Heart sounds: Normal heart sounds, S1 normal and S2 normal.   Pulmonary:      Effort: Pulmonary effort is normal. No respiratory distress.      Breath sounds: Normal breath sounds. No stridor. No wheezing or rales.   Abdominal:      General: Bowel sounds are normal. There is no distension.      Palpations: Abdomen is soft. There is no mass.      Tenderness: There is no abdominal tenderness. There is no guarding.    Musculoskeletal:         General: Normal range of motion.      Cervical back: Neck supple.      Right lower leg: Edema present.      Left lower leg: Edema present.   Skin:     General: Skin is warm and dry.      Coloration: Skin is not pale.      Findings: No erythema or rash.      Comments: Chronic bilateral venous stasis changes     Superficial wound on the anterior right lower leg with minimal bloody drainage.  No significant streaking above the right knee today.  No significant warmth     Neurological:      Mental Status: He is alert and oriented to person, place, and time.      Cranial Nerves: No cranial nerve deficit.   Psychiatric:         Mood and Affect: Mood normal.          Results Review:    I have reviewed all clinical data, test, lab, and imaging results.     Radiology  Doppler Ankle Brachial Index Single Level CAR  Result Date: 3/19/2025    Right Conclusion: The right [place] is unable to be assessed due to vessel incompressibility. Unable to assess digital insufficiency.   Left Conclusion: The left [place] is unable to be assessed due to vessel incompressibility. Unable to assess digital insufficiency.   Waveforms are multiphasic and do not suggest significant arterial insufficiency       Cardiology    Laboratory    Results from last 7 days   Lab Units 03/19/25  0031 03/17/25 2243 03/13/25  0148   WBC 10*3/mm3 8.01 8.88 7.62   HEMOGLOBIN g/dL 14.4 13.7 13.8   HEMATOCRIT % 46.4 43.0 45.4   PLATELETS 10*3/mm3 199 175 195     Results from last 7 days   Lab Units 03/19/25  0339 03/18/25  1056 03/17/25  2243 03/13/25  0148   SODIUM mmol/L 138  --  138 137   POTASSIUM mmol/L 3.5 4.0 3.5 4.0   CHLORIDE mmol/L 98  --  102 99   CO2 mmol/L 28.8  --  26.3 29.5*   BUN mg/dL 16  --  13 12   CREATININE mg/dL 0.96  --  0.77 0.92   GLUCOSE mg/dL 130*  --  93 170*   ALBUMIN g/dL  --   --  4.0  --    BILIRUBIN mg/dL  --   --  0.7  --    ALK PHOS U/L  --   --  82  --    AST (SGOT) U/L  --   --  28  --    ALT (SGPT) U/L  --    --  30  --    CALCIUM mg/dL 9.1  --  8.7 8.8     Results from last 7 days   Lab Units 03/17/25 2243   CK TOTAL U/L 49     Results from last 7 days   Lab Units 03/17/25 2243   SED RATE mm/hr 38*         Microbiology   Microbiology Results (last 10 days)       Procedure Component Value - Date/Time    Wound Culture - Wound, Leg, Right [[id number]]  (Abnormal) Collected: 03/18/25 1601    Lab Status: Preliminary result Specimen: Wound from Leg, Right Updated: 03/19/25 0733     Wound Culture Moderate growth (3+) Gram Positive Cocci     Gram Stain Many (4+) WBCs seen      Few (2+) Gram positive cocci    Blood Culture - Blood, Arm, Right [[id number]]  (Normal) Collected: 03/17/25 2248    Lab Status: Preliminary result Specimen: Blood from Arm, Right Updated: 03/18/25 2301     Blood Culture No growth at 24 hours    Narrative:      Less than seven (7) mL's of blood was collected.  Insufficient quantity may yield false negative results.    Blood Culture - Blood, Arm, Left [[id number]]  (Normal) Collected: 03/17/25 2243    Lab Status: Preliminary result Specimen: Blood from Arm, Left Updated: 03/18/25 2301     Blood Culture No growth at 24 hours    Blood Culture - Blood, Arm, Left [[id number]]  (Normal) Collected: 03/10/25 1508    Lab Status: Final result Specimen: Blood from Arm, Left Updated: 03/15/25 1515     Blood Culture No growth at 5 days    Blood Culture - Blood, Arm, Right [[id number]]  (Normal) Collected: 03/10/25 1315    Lab Status: Final result Specimen: Blood from Arm, Right Updated: 03/15/25 1330     Blood Culture No growth at 5 days            Medication Review:       Schedule Meds  apixaban, 5 mg, Oral, BID  budesonide-formoterol, 2 puff, Inhalation, BID - RT  carvedilol, 25 mg, Oral, BID With Meals  cefepime, 2,000 mg, Intravenous, Q8H  hydrALAZINE, 25 mg, Oral, TID  hydroCHLOROthiazide, 25 mg, Oral, Daily  Linezolid, 600 mg, Intravenous, Q12H  pantoprazole, 40 mg, Oral, BID  rOPINIRole, 0.25 mg, Oral,  Nightly  sodium chloride, 10 mL, Intravenous, Q12H  sodium chloride, 10 mL, Intravenous, Q12H  torsemide, 100 mg, Oral, Daily        Infusion Meds       PRN Meds    acetaminophen **OR** acetaminophen **OR** acetaminophen    aluminum-magnesium hydroxide-simethicone    senna-docusate sodium **AND** polyethylene glycol **AND** bisacodyl **AND** bisacodyl    Calcium Replacement - Follow Nurse / BPA Driven Protocol    HYDROcodone-acetaminophen    Magnesium Standard Dose Replacement - Follow Nurse / BPA Driven Protocol    melatonin    nitroglycerin    ondansetron ODT **OR** ondansetron    Phosphorus Replacement - Follow Nurse / BPA Driven Protocol    Potassium Replacement - Follow Nurse / BPA Driven Protocol    [COMPLETED] Insert Peripheral IV **AND** sodium chloride    sodium chloride    sodium chloride    sodium chloride    sodium chloride        Assessment & Plan       Antimicrobial Therapy   1.  P.o. Zyvox        2.  IV cefepime        3.        4.        5.            Assessment     Right lower extremity cellulitis with open superficial wound with minimal drainage.  Patient states there is worsening drainage despite being on oral antibiotics from previous admission.  Previous admission cultures grew MRSA and Enterococcus faecalis and patient was sent home on p.o. doxycycline p.o. Augmentin.  Current wound culture growing gram-positive cocci     Chronic bilateral lower extremities venous stasis changes     Morbid obesity with BMI of 65     Borderline diabetes     Plan     Continue IV Zyvox 600 mg every 12 hours-can likely switch to p.o. Zyvox tomorrow  Discontinue IV cefepime   Waiting on the wound culture  Wound care is following  Keep right leg elevated above heart level is much as possible  Wound care is following  Continue supportive care  A.mGildardo Benavidez, APRN  03/19/25  16:58 EDT    Note is dictated utilizing voice recognition software/Dragon

## 2025-03-20 ENCOUNTER — READMISSION MANAGEMENT (OUTPATIENT)
Dept: CALL CENTER | Facility: HOSPITAL | Age: 45
End: 2025-03-20
Payer: MEDICARE

## 2025-03-20 VITALS
DIASTOLIC BLOOD PRESSURE: 98 MMHG | WEIGHT: 315 LBS | HEIGHT: 71 IN | BODY MASS INDEX: 44.1 KG/M2 | OXYGEN SATURATION: 95 % | HEART RATE: 71 BPM | RESPIRATION RATE: 20 BRPM | SYSTOLIC BLOOD PRESSURE: 137 MMHG | TEMPERATURE: 97.7 F

## 2025-03-20 LAB
ANION GAP SERPL CALCULATED.3IONS-SCNC: 9.8 MMOL/L (ref 5–15)
BACTERIA SPEC AEROBE CULT: ABNORMAL
BASOPHILS # BLD AUTO: 0.06 10*3/MM3 (ref 0–0.2)
BASOPHILS NFR BLD AUTO: 0.7 % (ref 0–1.5)
BUN SERPL-MCNC: 16 MG/DL (ref 6–20)
BUN/CREAT SERPL: 16.5 (ref 7–25)
CALCIUM SPEC-SCNC: 8.4 MG/DL (ref 8.6–10.5)
CHLORIDE SERPL-SCNC: 98 MMOL/L (ref 98–107)
CO2 SERPL-SCNC: 30.2 MMOL/L (ref 22–29)
CREAT SERPL-MCNC: 0.97 MG/DL (ref 0.76–1.27)
DEPRECATED RDW RBC AUTO: 45.8 FL (ref 37–54)
EGFRCR SERPLBLD CKD-EPI 2021: 97.5 ML/MIN/1.73
EOSINOPHIL # BLD AUTO: 0.36 10*3/MM3 (ref 0–0.4)
EOSINOPHIL NFR BLD AUTO: 4.4 % (ref 0.3–6.2)
ERYTHROCYTE [DISTWIDTH] IN BLOOD BY AUTOMATED COUNT: 14.1 % (ref 12.3–15.4)
GLUCOSE SERPL-MCNC: 180 MG/DL (ref 65–99)
GRAM STN SPEC: ABNORMAL
GRAM STN SPEC: ABNORMAL
HCT VFR BLD AUTO: 44.4 % (ref 37.5–51)
HGB BLD-MCNC: 13.9 G/DL (ref 13–17.7)
IMM GRANULOCYTES # BLD AUTO: 0.02 10*3/MM3 (ref 0–0.05)
IMM GRANULOCYTES NFR BLD AUTO: 0.2 % (ref 0–0.5)
LYMPHOCYTES # BLD AUTO: 2.45 10*3/MM3 (ref 0.7–3.1)
LYMPHOCYTES NFR BLD AUTO: 29.7 % (ref 19.6–45.3)
MAGNESIUM SERPL-MCNC: 1.8 MG/DL (ref 1.6–2.6)
MCH RBC QN AUTO: 28.1 PG (ref 26.6–33)
MCHC RBC AUTO-ENTMCNC: 31.3 G/DL (ref 31.5–35.7)
MCV RBC AUTO: 89.9 FL (ref 79–97)
MONOCYTES # BLD AUTO: 0.63 10*3/MM3 (ref 0.1–0.9)
MONOCYTES NFR BLD AUTO: 7.6 % (ref 5–12)
NEUTROPHILS NFR BLD AUTO: 4.73 10*3/MM3 (ref 1.7–7)
NEUTROPHILS NFR BLD AUTO: 57.4 % (ref 42.7–76)
NRBC BLD AUTO-RTO: 0 /100 WBC (ref 0–0.2)
PHOSPHATE SERPL-MCNC: 4.3 MG/DL (ref 2.5–4.5)
PLATELET # BLD AUTO: 186 10*3/MM3 (ref 140–450)
PMV BLD AUTO: 10.7 FL (ref 6–12)
POTASSIUM SERPL-SCNC: 3.2 MMOL/L (ref 3.5–5.2)
RBC # BLD AUTO: 4.94 10*6/MM3 (ref 4.14–5.8)
SODIUM SERPL-SCNC: 138 MMOL/L (ref 136–145)
WBC NRBC COR # BLD AUTO: 8.25 10*3/MM3 (ref 3.4–10.8)

## 2025-03-20 PROCEDURE — 80048 BASIC METABOLIC PNL TOTAL CA: CPT | Performed by: INTERNAL MEDICINE

## 2025-03-20 PROCEDURE — 85025 COMPLETE CBC W/AUTO DIFF WBC: CPT | Performed by: INTERNAL MEDICINE

## 2025-03-20 PROCEDURE — 83735 ASSAY OF MAGNESIUM: CPT | Performed by: INTERNAL MEDICINE

## 2025-03-20 PROCEDURE — 84100 ASSAY OF PHOSPHORUS: CPT | Performed by: INTERNAL MEDICINE

## 2025-03-20 PROCEDURE — 25010000002 LINEZOLID 600 MG/300ML SOLUTION: Performed by: INTERNAL MEDICINE

## 2025-03-20 PROCEDURE — 94799 UNLISTED PULMONARY SVC/PX: CPT

## 2025-03-20 RX ORDER — POTASSIUM CHLORIDE 1500 MG/1
40 TABLET, EXTENDED RELEASE ORAL EVERY 4 HOURS
Status: COMPLETED | OUTPATIENT
Start: 2025-03-20 | End: 2025-03-20

## 2025-03-20 RX ORDER — LINEZOLID 600 MG/1
600 TABLET, FILM COATED ORAL 2 TIMES DAILY
Qty: 10 TABLET | Refills: 0 | Status: SHIPPED | OUTPATIENT
Start: 2025-03-20

## 2025-03-20 RX ADMIN — HYDRALAZINE HYDROCHLORIDE 25 MG: 25 TABLET ORAL at 09:24

## 2025-03-20 RX ADMIN — APIXABAN 5 MG: 5 TABLET, FILM COATED ORAL at 09:24

## 2025-03-20 RX ADMIN — POTASSIUM CHLORIDE 40 MEQ: 1500 TABLET, EXTENDED RELEASE ORAL at 09:24

## 2025-03-20 RX ADMIN — Medication 10 ML: at 10:39

## 2025-03-20 RX ADMIN — HYDROCHLOROTHIAZIDE 25 MG: 25 TABLET ORAL at 09:24

## 2025-03-20 RX ADMIN — LINEZOLID 600 MG: 600 INJECTION, SOLUTION INTRAVENOUS at 01:19

## 2025-03-20 RX ADMIN — HYDROCODONE BITARTRATE AND ACETAMINOPHEN 1 TABLET: 5; 325 TABLET ORAL at 01:21

## 2025-03-20 RX ADMIN — BUDESONIDE AND FORMOTEROL FUMARATE DIHYDRATE 2 PUFF: 160; 4.5 AEROSOL RESPIRATORY (INHALATION) at 11:04

## 2025-03-20 RX ADMIN — CARVEDILOL 25 MG: 25 TABLET, FILM COATED ORAL at 09:24

## 2025-03-20 RX ADMIN — PANTOPRAZOLE SODIUM 40 MG: 40 TABLET, DELAYED RELEASE ORAL at 09:24

## 2025-03-20 RX ADMIN — POTASSIUM CHLORIDE 40 MEQ: 1500 TABLET, EXTENDED RELEASE ORAL at 06:15

## 2025-03-20 RX ADMIN — TORSEMIDE 100 MG: 100 TABLET ORAL at 09:24

## 2025-03-20 RX ADMIN — Medication 10 ML: at 09:31

## 2025-03-20 NOTE — PROGRESS NOTES
Infectious Diseases Progress Note      LOS: 2 days   Patient Care Team:  Penny Rueda APRN as Nurse Practitioner (Nurse Practitioner)  Sameera Bethea RD as Dietitian (Nutrition)    Chief Complaint: Right leg pain swelling and drainage    Subjective       The patient has been afebrile for the last 24 hours.  The patient is on room air, hemodynamically stable, and is tolerating antimicrobial therapy.  Feeling better today    Review of Systems:   Review of Systems   Constitutional: Negative.    HENT: Negative.     Eyes: Negative.    Respiratory: Negative.     Cardiovascular:  Positive for leg swelling.   Gastrointestinal: Negative.    Endocrine: Negative.    Genitourinary: Negative.    Musculoskeletal: Negative.    Skin: Negative.    Neurological: Negative.    Psychiatric/Behavioral: Negative.     All other systems reviewed and are negative.       Objective     Vital Signs  Temp:  [97.7 °F (36.5 °C)-98.6 °F (37 °C)] 97.7 °F (36.5 °C)  Heart Rate:  [64-80] 71  Resp:  [16-20] 20  BP: (126-146)/(65-98) 137/98    Physical Exam:  Physical Exam  Vitals and nursing note reviewed.   Constitutional:       General: He is not in acute distress.     Appearance: Normal appearance. He is well-developed. He is obese. He is not diaphoretic.   HENT:      Head: Normocephalic and atraumatic.   Eyes:      Conjunctiva/sclera: Conjunctivae normal.      Pupils: Pupils are equal, round, and reactive to light.   Cardiovascular:      Rate and Rhythm: Normal rate and regular rhythm.      Heart sounds: Normal heart sounds, S1 normal and S2 normal.   Pulmonary:      Effort: Pulmonary effort is normal. No respiratory distress.      Breath sounds: Normal breath sounds. No stridor. No wheezing or rales.   Abdominal:      General: Bowel sounds are normal. There is no distension.      Palpations: Abdomen is soft. There is no mass.      Tenderness: There is no abdominal tenderness. There is no guarding.   Musculoskeletal:         General:  Normal range of motion.      Cervical back: Neck supple.      Right lower leg: Edema present.      Left lower leg: Edema present.   Skin:     General: Skin is warm and dry.      Coloration: Skin is not pale.      Findings: No erythema or rash.      Comments: Chronic bilateral venous stasis changes     Superficial wound on the anterior right lower leg with minimal bloody drainage.  No significant streaking above the right knee today.  No significant warmth     Neurological:      Mental Status: He is alert and oriented to person, place, and time.      Cranial Nerves: No cranial nerve deficit.   Psychiatric:         Mood and Affect: Mood normal.          Results Review:    I have reviewed all clinical data, test, lab, and imaging results.     Radiology  No Radiology Exams Resulted Within Past 24 Hours      Cardiology    Laboratory    Results from last 7 days   Lab Units 03/20/25  0235 03/19/25  0031 03/17/25  2243   WBC 10*3/mm3 8.25 8.01 8.88   HEMOGLOBIN g/dL 13.9 14.4 13.7   HEMATOCRIT % 44.4 46.4 43.0   PLATELETS 10*3/mm3 186 199 175     Results from last 7 days   Lab Units 03/20/25  0235 03/19/25  0339 03/18/25  1056 03/17/25  2243   SODIUM mmol/L 138 138  --  138   POTASSIUM mmol/L 3.2* 3.5 4.0 3.5   CHLORIDE mmol/L 98 98  --  102   CO2 mmol/L 30.2* 28.8  --  26.3   BUN mg/dL 16 16  --  13   CREATININE mg/dL 0.97 0.96  --  0.77   GLUCOSE mg/dL 180* 130*  --  93   ALBUMIN g/dL  --   --   --  4.0   BILIRUBIN mg/dL  --   --   --  0.7   ALK PHOS U/L  --   --   --  82   AST (SGOT) U/L  --   --   --  28   ALT (SGPT) U/L  --   --   --  30   CALCIUM mg/dL 8.4* 9.1  --  8.7     Results from last 7 days   Lab Units 03/17/25  2243   CK TOTAL U/L 49     Results from last 7 days   Lab Units 03/17/25  2243   SED RATE mm/hr 38*         Microbiology   Microbiology Results (last 10 days)       Procedure Component Value - Date/Time    Wound Culture - Wound, Leg, Right [[id number]]  (Abnormal)  (Susceptibility) Collected: 03/18/25  1601    Lab Status: Edited Result - FINAL Specimen: Wound from Leg, Right Updated: 03/20/25 1028     Wound Culture Moderate growth (3+) Enterococcus faecalis     Gram Stain Many (4+) WBCs seen      Few (2+) Gram positive cocci    Susceptibility        Enterococcus faecalis      QUINN      Ampicillin Susceptible      Linezolid Susceptible (C)  [1]       Vancomycin Susceptible                   [1]  Appended report. These results have been appended to a previously final verified report.                    Blood Culture - Blood, Arm, Right [[id number]]  (Normal) Collected: 03/17/25 2248    Lab Status: Preliminary result Specimen: Blood from Arm, Right Updated: 03/19/25 2301     Blood Culture No growth at 2 days    Narrative:      Less than seven (7) mL's of blood was collected.  Insufficient quantity may yield false negative results.    Blood Culture - Blood, Arm, Left [[id number]]  (Normal) Collected: 03/17/25 2243    Lab Status: Preliminary result Specimen: Blood from Arm, Left Updated: 03/19/25 2301     Blood Culture No growth at 2 days    Blood Culture - Blood, Arm, Left [[id number]]  (Normal) Collected: 03/10/25 1508    Lab Status: Final result Specimen: Blood from Arm, Left Updated: 03/15/25 1515     Blood Culture No growth at 5 days            Medication Review:       Schedule Meds        Infusion Meds  No current facility-administered medications for this encounter.      PRN Meds          Assessment & Plan       Antimicrobial Therapy   1.  P.o. Zyvox        2.        3.        4.        5.            Assessment     Right lower extremity cellulitis with open superficial wound with minimal drainage.  Patient states there is worsening drainage despite being on oral antibiotics from previous admission.  Previous admission cultures grew MRSA and Enterococcus faecalis and patient was sent home on p.o. doxycycline p.o. Augmentin.  Current wound culture growing Enterococcus faecalis     Chronic bilateral lower extremities  venous stasis changes     Morbid obesity with BMI of 65     Borderline diabetes     Plan     Continue linezolid but switch to p.o. 600 mg twice daily for 10 days total treatment  Wound care is following  Keep right leg elevated above heart level is much as possible  Wound care is following  Continue supportive care  Okay to discharge from Infectious Disease standpoint      Toña Benavidez, APRN  03/20/25  14:30 EDT    Note is dictated utilizing voice recognition software/Dragon

## 2025-03-20 NOTE — CASE MANAGEMENT/SOCIAL WORK
Case Management Discharge Note      Final Note: Home w/ referral to [facility] Wound care    Provided Post Acute Provider List?: N/A  Provided Post Acute Provider Quality & Resource List?: N/A    Selected Continued Care - Discharged on 3/20/2025 Admission date: 3/17/2025 - Discharge disposition: Home or Self Care         Transportation Services  Private: Car    Final Discharge Disposition Code: 01 - home or self-care

## 2025-03-20 NOTE — PLAN OF CARE
Goal Outcome Evaluation:         VSS on room air, up ad yanelis, prn's given for pain control, roel CDI, midline JIE patent

## 2025-03-20 NOTE — DISCHARGE SUMMARY
"             [facility] Medicine Services  Discharge Summary    Date of Service: 3/20/2025  Patient Name: Farhat Hein  : 1980  MRN: [id number]    Date of Admission: 3/17/2025  Discharge Diagnosis: Cellulitis  Date of Discharge: 3/20/2025  Primary Care Physician: No primary care provider on file.      Presenting Problem:   Cellulitis [L03.90]  Skin infection [L08.9]  MRSA (methicillin resistant Staphylococcus aureus) [A49.02]  Cellulitis of right lower extremity [L03.115]    Active and Resolved Hospital Problems:  Active Hospital Problems    Diagnosis POA    **Cellulitis [L03.90] Yes      Resolved Hospital Problems   No resolved problems to display.         Hospital Course     HPI:    \"A 45 y.o. old male patient with PMH of asthma COPD GERD CKD type 2 diabetes mellitus morbid obesity, restless leg syndrome, DVT, factor V [place], presents to the hospital with complaints of worsening right lower extremity cellulitis.  Patient was recently in hospital for right lower extremity cellulitis and got discharged with oral medication Augmentin and doxycycline. His wound Cx on 3/9 with Enterococcus faecalis and MRSA. Allergy to vancomycin. He is started on the zyvox in the ED. One dose of zosyn given. Added cefepime. Wound Cx and BCX to follow up. \"    Hospital Course:    Patient was hospitalized with complaints of worsening right lower extremity cellulitis.  He was evaluated by infectious disease team and started on a course of IV antibiotics.  After several days of improvement patient was switched to p.o. Zyvox for remaining treatment course and to continue wound care after discharge.        DISCHARGE Follow Up Recommendations for labs and diagnostics:     PCP, wound care        Day of Discharge     Vital Signs:  Temp:  [97.7 °F (36.5 °C)-98.6 °F (37 °C)] 97.7 °F (36.5 °C)  Heart Rate:  [64-80] 71  Resp:  [16-20] 20  BP: (126-146)/(65-98) 137/98    Physical Exam:  Physical Exam  Constitutional:       Appearance: " He is obese.      Comments: Chronically ill-appearing individual   Cardiovascular:      Rate and Rhythm: Normal rate and regular rhythm.      Pulses: Normal pulses.      Heart sounds: Normal heart sounds.   Pulmonary:      Effort: Pulmonary effort is normal.      Breath sounds: Normal breath sounds.   Abdominal:      Palpations: Abdomen is soft.   Neurological:      Mental Status: He is alert. Mental status is at baseline.              Pertinent  and/or Most Recent Results     LAB RESULTS:      Lab 03/20/25  0235 03/19/25  0031 03/17/25  2252 03/17/25  2243   WBC 8.25 8.01  --  8.88   HEMOGLOBIN 13.9 14.4  --  13.7   HEMATOCRIT 44.4 46.4  --  43.0   PLATELETS 186 199  --  175   NEUTROS ABS 4.73 4.66  --  5.23   IMMATURE GRANS (ABS) 0.02 0.03  --  0.02   LYMPHS ABS 2.45 2.27  --  2.51   MONOS ABS 0.63 0.64  --  0.77   EOS ABS 0.36 0.35  --  0.29   MCV 89.9 90.3  --  90.0   SED RATE  --   --   --  38*   CRP  --   --   --  1.07*   LACTATE  --   --  0.6  --          Lab 03/20/25  0235 03/19/25  0339 03/19/25  0031 03/18/25  1056 03/17/25  2243   SODIUM 138 138  --   --  138   POTASSIUM 3.2* 3.5  --  4.0 3.5   CHLORIDE 98 98  --   --  102   CO2 30.2* 28.8  --   --  26.3   ANION GAP 9.8 11.2  --   --  9.7   BUN 16 16  --   --  13   CREATININE 0.97 0.96  --   --  0.77   EGFR 97.5 98.7  --   --  111.8   GLUCOSE 180* 130*  --   --  93   CALCIUM 8.4* 9.1  --   --  8.7   MAGNESIUM 1.8 1.7  --   --   --    PHOSPHORUS 4.3  --  3.9  --   --          Lab 03/17/25  2243   TOTAL PROTEIN 8.2   ALBUMIN 4.0   GLOBULIN 4.2   ALT (SGPT) 30   AST (SGOT) 28   BILIRUBIN 0.7   ALK PHOS 82                     Brief Urine Lab Results  (Last result in the past 365 days)        Color   Clarity   Blood   Leuk Est   Nitrite   Protein   CREAT   Urine HCG        07/05/24 1449 Red   Turbid   Large (3+)   Trace   Negative   30 mg/dL (1+)                 Microbiology Results (last 10 days)       Procedure Component Value - Date/Time    Wound Culture -  Wound, Leg, Right [[id number]]  (Abnormal)  (Susceptibility) Collected: 03/18/25 1601    Lab Status: Edited Result - FINAL Specimen: Wound from Leg, Right Updated: 03/20/25 1028     Wound Culture Moderate growth (3+) Enterococcus faecalis     Gram Stain Many (4+) WBCs seen      Few (2+) Gram positive cocci    Susceptibility        Enterococcus faecalis      QUINN      Ampicillin Susceptible      Linezolid Susceptible (C)  [1]       Vancomycin Susceptible                   [1]  Appended report. These results have been appended to a previously final verified report.                    Blood Culture - Blood, Arm, Right [[id number]]  (Normal) Collected: 03/17/25 2248    Lab Status: Preliminary result Specimen: Blood from Arm, Right Updated: 03/19/25 2301     Blood Culture No growth at 2 days    Narrative:      Less than seven (7) mL's of blood was collected.  Insufficient quantity may yield false negative results.    Blood Culture - Blood, Arm, Left [[id number]]  (Normal) Collected: 03/17/25 2243    Lab Status: Preliminary result Specimen: Blood from Arm, Left Updated: 03/19/25 2301     Blood Culture No growth at 2 days    Blood Culture - Blood, Arm, Left [[id number]]  (Normal) Collected: 03/10/25 1508    Lab Status: Final result Specimen: Blood from Arm, Left Updated: 03/15/25 1515     Blood Culture No growth at 5 days    Blood Culture - Blood, Arm, Right [[id number]]  (Normal) Collected: 03/10/25 1315    Lab Status: Final result Specimen: Blood from Arm, Right Updated: 03/15/25 1330     Blood Culture No growth at 5 days                 Results for orders placed during the hospital encounter of 03/17/25    Doppler Ankle Brachial Index Single Level CAR    Interpretation Summary    Right Conclusion: The right CHEYENNE is unable to be assessed due to vessel incompressibility. Unable to assess digital insufficiency.    Left Conclusion: The left CHEYNENE is unable to be assessed due to vessel incompressibility. Unable to assess digital  insufficiency.    Waveforms are multiphasic and do not suggest significant arterial insufficiency      Results for orders placed during the hospital encounter of 03/17/25    Doppler Ankle Brachial Index Single Level CAR    Interpretation Summary    Right Conclusion: The right CHEYENNE is unable to be assessed due to vessel incompressibility. Unable to assess digital insufficiency.    Left Conclusion: The left CHEYENNE is unable to be assessed due to vessel incompressibility. Unable to assess digital insufficiency.    Waveforms are multiphasic and do not suggest significant arterial insufficiency          Labs Pending at Discharge:  Pending Results       Procedure [Order ID] Specimen - Date/Time    Potassium [[id number]]     Specimen: Blood             Procedures Performed           Consults:   Consults       Date and Time Order Name Status Description    3/18/2025 12:37 AM Inpatient Infectious Diseases Consult Completed     3/9/2025 12:57 PM Inpatient Infectious Diseases Consult Completed               Discharge Details        Discharge Medications        New Medications        Instructions Start Date   linezolid 600 MG tablet  Commonly known as: Zyvox   600 mg, Oral, 2 Times Daily             Continue These Medications        Instructions Start Date   albuterol sulfate  (90 Base) MCG/ACT inhaler  Commonly known as: PROVENTIL HFA;VENTOLIN HFA;PROAIR HFA   2 puffs, Inhalation, Every 4 Hours PRN      budesonide-formoterol 80-4.5 MCG/ACT inhaler  Commonly known as: Symbicort   2 puffs, Inhalation, 2 Times Daily - RT      carvedilol 25 MG tablet  Commonly known as: COREG   25 mg, Oral, 2 Times Daily With Meals      Eliquis 5 MG tablet tablet  Generic drug: apixaban   5 mg, Oral, 2 Times Daily      hydrALAZINE 25 MG tablet  Commonly known as: APRESOLINE   25 mg, Oral, 3 Times Daily      hydroCHLOROthiazide 25 MG tablet   25 mg, Oral, Daily      loperamide 2 MG tablet  Commonly known as: Imodium A-D   2 mg, Oral, 4 Times  Daily PRN      multivitamin tablet tablet   1 tablet, Daily      pantoprazole 40 MG EC tablet  Commonly known as: PROTONIX   40 mg, 2 Times Daily      potassium chloride 10 MEQ CR capsule  Commonly known as: MICRO-K   10 mEq, Oral, Daily      rOPINIRole 0.25 MG tablet  Commonly known as: REQUIP   0.25 mg, Nightly      sertraline 25 MG tablet  Commonly known as: Zoloft   25 mg, Oral, Daily      silver sulfadiazine 1 % cream  Commonly known as: SILVADENE, SSD   1 Application, Topical, Every 24 Hours Scheduled      torsemide 100 MG tablet  Commonly known as: DEMADEX   100 mg, Oral, Daily      vitamin D3 125 MCG (5000 UT) capsule capsule   5,000 Units, Oral, Daily             Stop These Medications      amoxicillin-clavulanate 875-125 MG per tablet  Commonly known as: AUGMENTIN     doxycycline 100 MG capsule  Commonly known as: MONODOX              Allergies   Allergen Reactions    Promethazine Itching    Vancomycin Anaphylaxis    Calamine-Zinc Oxide Itching and Rash         Discharge Disposition:   Home or Self Care    Diet:  Hospital:  Diet Order   Procedures    Diet: Cardiac; Healthy Heart (2-3 Na+); Fluid Consistency: Thin (IDDSI 0)         Discharge Activity:         CODE STATUS:  Code Status and Medical Interventions: CPR (Attempt to Resuscitate); Full Support   Ordered at: 03/18/25 0036     Code Status (Patient has no pulse and is not breathing):    CPR (Attempt to Resuscitate)     Medical Interventions (Patient has pulse or is breathing):    Full Support         Future Appointments   Date Time Provider Department Center   3/24/2025 10:00 AM Farhat Allred DO [facility]   3/24/2025 11:00 AM Brandie Hernandez MD [facility]           Time spent on Discharge including face to face service:  54 minutes    Signature: Electronically signed by Hayden Arana MD, 03/20/25, 12:44 EDT.  [facility] Hospitalist Team

## 2025-03-21 ENCOUNTER — TRANSITIONAL CARE MANAGEMENT TELEPHONE ENCOUNTER (OUTPATIENT)
Dept: CALL CENTER | Facility: HOSPITAL | Age: 45
End: 2025-03-21
Payer: MEDICARE

## 2025-03-21 NOTE — OUTREACH NOTE
Prep Survey      Flowsheet Row Responses   [facility]? [place]   Is LACE score < 7 ? No   Eligibility [facility]   Date of Admission 03/17/25   Date of Discharge 03/20/25   Discharge Disposition Home or Self Care   Discharge diagnosis Cellulitis   Does the patient have one of the following disease processes/diagnoses(primary or secondary)? Other   Does the patient have Home health ordered? No   Is there a DME ordered? No   Comments regarding appointments [facility] wound care   Medication alerts for this patient possible IV abx   Prep survey completed? Yes            DANGELO HALL - Registered Nurse

## 2025-03-21 NOTE — OUTREACH NOTE
Call Center TCM Note      Flowsheet Row Responses   [facility] patient discharged from? [place]   Does the patient have one of the following disease processes/diagnoses(primary or secondary)? Other   TCM attempt successful? No   Unsuccessful attempts Attempt 1            Ally Holt RN    3/21/2025, 10:51 EDT

## 2025-03-21 NOTE — OUTREACH NOTE
Call Center TCM Note      Flowsheet Row Responses   [facility] patient discharged from? [place]   Does the patient have one of the following disease processes/diagnoses(primary or secondary)? Other   TCM attempt successful? No   Unsuccessful attempts Attempt 2            Ally Holt RN    3/21/2025, 14:59 EDT

## 2025-03-22 ENCOUNTER — TRANSITIONAL CARE MANAGEMENT TELEPHONE ENCOUNTER (OUTPATIENT)
Dept: CALL CENTER | Facility: HOSPITAL | Age: 45
End: 2025-03-22
Payer: MEDICARE

## 2025-03-22 LAB
BACTERIA SPEC AEROBE CULT: NORMAL
BACTERIA SPEC AEROBE CULT: NORMAL

## 2025-03-22 NOTE — OUTREACH NOTE
Call Center TCM Note      Flowsheet Row Responses   [facility] patient discharged from? [place]   Does the patient have one of the following disease processes/diagnoses(primary or secondary)? Other   TCM attempt successful? Yes   Call start time 1013   Call end time 1014   Discharge diagnosis Cellulitis   Meds reviewed with patient/caregiver? Yes   Is the patient having any side effects they believe may be caused by any medication additions or changes? No   Does the patient have all medications ordered at discharge? Yes   Is the patient taking all medications as directed (includes completed medication regime)? Yes   Comments New pt / hosp dc fu apt 3/24/25   Does the patient have an appointment with their PCP within 7-14 days of discharge? Yes   Has home health visited the patient within 72 hours of discharge? N/A   Psychosocial issues? No   Did the patient receive a copy of their discharge instructions? Yes   Nursing interventions Reviewed instructions with patient   What is the patient's perception of their health status since discharge? Improving   Is the patient/caregiver able to teach back signs and symptoms related to disease process for when to call PCP? Yes   Is the patient/caregiver able to teach back signs and symptoms related to disease process for when to call 911? Yes   Is the patient/caregiver able to teach back the hierarchy of who to call/visit for symptoms/problems? PCP, Specialist, Home health nurse, Urgent Care, ED, 911 Yes   If the patient is a current smoker, are they able to teach back resources for cessation? Not a smoker   TCM call completed? Yes   Call end time 1014            Komal Tavarez RN    3/22/2025, 10:16 EDT

## 2025-03-24 ENCOUNTER — TELEPHONE (OUTPATIENT)
Dept: SURGERY | Facility: CLINIC | Age: 45
End: 2025-03-24

## 2025-03-24 NOTE — TELEPHONE ENCOUNTER
Caller: Farhat Hein    Relationship:  Self    Best call back number: [phone]    PATIENT CALLED REQUESTING TO CANCEL SAME DAY APPT.    Did the patient call AFTER the start time of their scheduled appointment?  []YES  [x]NO    Was the patient's appointment rescheduled? []YES  [x]NO

## 2025-04-01 ENCOUNTER — TELEPHONE (OUTPATIENT)
Dept: SURGERY | Facility: CLINIC | Age: 45
End: 2025-04-01

## 2025-04-01 NOTE — TELEPHONE ENCOUNTER
Caller: Farhat Hein    Relationship:  Self    Best call back number: [phone] (home)       PATIENT CALLED REQUESTING TO CANCEL SAME DAY APPT.    Did the patient call AFTER the start time of their scheduled appointment?  [x]YES  []NO    Was the patient's appointment rescheduled? [x]YES  []NO    Any additional information: PT HAS COVID. SCHEDULED NEW APPT FOR PT FOR 4/7/25. DID NOT CHANGE APPT THAT WAS NO SHOWED.

## 2025-04-02 ENCOUNTER — TELEMEDICINE (OUTPATIENT)
Dept: FAMILY MEDICINE CLINIC | Facility: TELEHEALTH | Age: 45
End: 2025-04-02
Payer: MEDICARE

## 2025-04-02 DIAGNOSIS — J44.1 COPD WITH ACUTE EXACERBATION: Primary | ICD-10-CM

## 2025-04-02 RX ORDER — AZITHROMYCIN 250 MG/1
TABLET, FILM COATED ORAL
Qty: 6 TABLET | Refills: 0 | Status: SHIPPED | OUTPATIENT
Start: 2025-04-02

## 2025-04-02 RX ORDER — PREDNISONE 10 MG/1
TABLET ORAL
Qty: 21 TABLET | Refills: 0 | Status: SHIPPED | OUTPATIENT
Start: 2025-04-02

## 2025-04-02 RX ORDER — TIRZEPATIDE 7.5 MG/.5ML
INJECTION, SOLUTION SUBCUTANEOUS
COMMUNITY

## 2025-04-02 RX ORDER — HYDROCODONE BITARTRATE AND ACETAMINOPHEN 10; 325 MG/1; MG/1
1 TABLET ORAL EVERY 6 HOURS PRN
COMMUNITY

## 2025-04-02 NOTE — PROGRESS NOTES
You have chosen to receive care through a telehealth visit.  Do you consent to use a video/audio connection for your medical care today? Yes     Patient or patient representative verbalized consent for the use of Ambient Listening during the visit with  SAMI Zhou for chart documentation. 4/2/2025  09:43 EDT    CHIEF COMPLAINT  No chief complaint on file.        HPI  History of Present Illness  The patient is a 45-year-old male presenting via virtual visit with complaints of cough, congestion, and sore throat.    He reports severe chest congestion and a persistent cough that began the previous night. He has not experienced any feverish symptoms. A recent COVID-19 test was conducted, which returned a negative result. He has been using Dimetapp for his cough, which he finds to be the most effective treatment. He attempted to alleviate these symptoms with his inhaler, which provided some relief.    He has a known history of COPD.    ALLERGIES  The patient is allergic to PROMETHAZINE, VANCOMYCIN, CALAMINE, and ZINC.    MEDICATIONS  Current: Dimetapp       Review of Systems  See HPI    Past Medical History:   Diagnosis Date    Allergic 1980    seasonal    Arthritis 01/05/2020    legs    Asthma 01/2024    Chronic obstructive pulmonary disease 07/17/2023    COPD (chronic obstructive pulmonary disease) 04/01/2011    GERD (gastroesophageal reflux disease) 06/06/2006    Hypertension 06/06/2006    Kidney stone 08/08/2010    Low back pain 01/01/2020    Obesity 01/21/1997    Pneumonia 11/2023    Renal insufficiency 03/2021    Stroke 01/21/2015 01/21/2016    Substance abuse     Type 2 diabetes mellitus 07/27/2019    Urinary tract infection 03/15/2020       Family History   Problem Relation Age of Onset    Arthritis Mother     COPD Mother     Diabetes Mother     Hyperlipidemia Mother     Hypertension Mother     Alcohol abuse Father     Arthritis Father     Cancer Father         SKIN    Diabetes Father     Heart  disease Father     Hyperlipidemia Father     Hypertension Father     Obesity Father     Arthritis Sister     Cancer Sister         BREAST    Diabetes Sister     Hyperlipidemia Sister     Heart disease Sister     Hypertension Sister     Obesity Sister     Sleep apnea Sister     Arthritis Brother     Cancer Brother         KIDNEY/BRAIN/SKIN    Hyperlipidemia Brother     Kidney disease Brother     Hypertension Brother     Alcohol abuse Brother     Mental illness Brother         schizophrenia    Cancer Sister         COLON    Diabetes Sister             Hyperlipidemia Sister     Hypertension Sister     Diabetes Brother     Heart disease Brother     Hyperlipidemia Brother     Hypertension Brother     Alcohol abuse Brother     Cancer Maternal Grandmother     Cancer Maternal Uncle        Social History     Socioeconomic History    Marital status:    Tobacco Use    Smoking status: Former     Current packs/day: 0.00     Average packs/day: 2.0 packs/day for 10.4 years (20.8 ttl pk-yrs)     Types: Cigarettes     Start date: 1998     Quit date: 2008     Years since quittin.8     Passive exposure: Past    Smokeless tobacco: Never   Vaping Use    Vaping status: Never Used   Substance and Sexual Activity    Alcohol use: Not Currently     Alcohol/week: 12.0 standard drinks of alcohol     Types: 12 Cans of beer per week     Comment: PER DAY    Drug use: Yes     Frequency: 7.0 times per week     Types: Marijuana    Sexual activity: Yes     Partners: Female     Birth control/protection: Condom       Farhat Hein  reports that he quit smoking about 16 years ago. His smoking use included cigarettes. He started smoking about 27 years ago. He has a 20.8 pack-year smoking history. He has been exposed to tobacco smoke. He has never used smokeless tobacco.             There were no vitals taken for this visit.    PHYSICAL EXAM  Physical Exam   Constitutional: He is oriented to person, place, and time. He appears  well-developed and well-nourished. He does not have a sickly appearance. He does not appear ill.   HENT:   Head: Normocephalic and atraumatic.   Pulmonary/Chest: Effort normal.  No respiratory distress (persistent cough during visit; mild SOA while talking, no audible wheezing).  Neurological: He is alert and oriented to person, place, and time.         Diagnoses and all orders for this visit:    1. COPD with acute exacerbation (Primary)  -     azithromycin (Zithromax Z-Vincent) 250 MG tablet; Take 2 tablets by mouth on day 1, then 1 tablet daily on days 2-5  Dispense: 6 tablet; Refill: 0  -     predniSONE (DELTASONE) 10 MG (21) dose pack; Use as directed on package  Dispense: 21 tablet; Refill: 0    --take medications as prescribed  --increase fluids, rest as needed, tylenol or ibuprofen for pain  --f/u in 3-5 days if no improvement      Assessment & Plan  1. Cough and congestion.  He reports severe chest congestion and a persistent cough that began the previous night. He has not experienced any feverish symptoms. A recent COVID-19 test was conducted, which returned a negative result. He has been using Dimetapp for his cough, which he finds to be the most effective treatment. He attempted to alleviate these symptoms with his inhaler, which provided some relief. A prescription for a Z-Vincent and a steroid pack will be sent to  Pharmacy in [place] [id number]. He is advised to adhere to the instructions provided on the medication packages. Additionally, he may continue using Dimetapp for symptom management.    2. Chronic obstructive pulmonary disease (COPD) exacerbation.  He has a known history of COPD. The current symptoms are likely due to an exacerbation from pollen or other irritants. He is advised to use his inhaler every 4 hours as needed.         FOLLOW-UP  As discussed during visit with PCP/[facility] if no improvement or Urgent Care/Emergency Department if worsening of symptoms    Patient verbalizes  understanding of medication dosage, comfort measures, instructions for treatment and follow-up.    SAMI Zhou  04/02/2025  09:43 EDT    Mode of Visit: Video  Location of patient: -HOME-  Location of provider: +HOME+  You have chosen to receive care through a telehealth visit.  The patient has signed the video visit consent form.  The visit included audio and video interaction. No technical issues occurred during this visit.    The use of a video visit has been reviewed with the patient and verbal informed consent has been obtained. Myself and Farhat Hein     participated in this visit. The patient is located in [address] [place] [address] Dr MELENDREZ [address] [place] [address] [place] [facility]  I am located in [place], [place]. [employer] and [employer] Video Client were utilized. I spent 2 minutes in the patient's chart for this visit.      Note Disclaimer: At [facility], we believe that sharing information builds trust and better   relationships. You are receiving this note because you recently visited [facility]. It is possible you   will see health information before a provider has talked with you about it. This kind of information can   be easy to misunderstand. To help you fully understand what it means for your health, we urge you to   discuss this note with your provider.

## 2025-04-03 RX ORDER — APIXABAN 5 MG/1
5 TABLET, FILM COATED ORAL 2 TIMES DAILY
Qty: 180 TABLET | Refills: 0 | Status: SHIPPED | OUTPATIENT
Start: 2025-04-03

## 2025-04-03 NOTE — TELEPHONE ENCOUNTER
Rx Refill Note  Requested Prescriptions     Pending Prescriptions Disp Refills    Eliquis 5 MG tablet tablet [Pharmacy Med Name: ELIQUIS 5 MG TABLET] 180 tablet 0     Sig: TAKE 1 TABLET BY MOUTH 2 TIMES A DAY      Last office visit with prescribing clinician: 4/17/2024   Last telemedicine visit with prescribing clinician: Visit date not found   Next office visit with prescribing clinician: Visit date not found                         Would you like a call back once the refill request has been completed: [] Yes [] No    If the office needs to give you a call back, can they leave a voicemail: [] Yes [] No    Danya Rosales CMA  04/03/25, 08:56 EDT

## 2025-04-17 ENCOUNTER — APPOINTMENT (OUTPATIENT)
Dept: CARDIOLOGY | Facility: HOSPITAL | Age: 45
End: 2025-04-17
Payer: MEDICARE

## 2025-04-17 ENCOUNTER — APPOINTMENT (OUTPATIENT)
Dept: CT IMAGING | Facility: HOSPITAL | Age: 45
End: 2025-04-17
Payer: MEDICARE

## 2025-04-17 ENCOUNTER — APPOINTMENT (OUTPATIENT)
Dept: GENERAL RADIOLOGY | Facility: HOSPITAL | Age: 45
End: 2025-04-17
Payer: MEDICARE

## 2025-04-17 ENCOUNTER — HOSPITAL ENCOUNTER (OUTPATIENT)
Facility: HOSPITAL | Age: 45
Setting detail: OBSERVATION
Discharge: HOME | End: 2025-04-18
Attending: FAMILY MEDICINE | Admitting: FAMILY MEDICINE
Payer: MEDICARE
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
## 2025-04-17 DIAGNOSIS — M25.512 ACUTE PAIN OF LEFT SHOULDER: ICD-10-CM

## 2025-04-17 DIAGNOSIS — S42.216S: ICD-10-CM

## 2025-04-17 DIAGNOSIS — R42 DIZZINESS: Primary | ICD-10-CM

## 2025-04-17 DIAGNOSIS — S42.255A CLOSED NONDISPLACED FRACTURE OF GREATER TUBEROSITY OF LEFT HUMERUS, INITIAL ENCOUNTER: ICD-10-CM

## 2025-04-17 PROBLEM — S42.213A HUMERAL SURGICAL NECK FRACTURE: Status: ACTIVE | Noted: 2025-04-17

## 2025-04-17 LAB
ALBUMIN SERPL-MCNC: 3.9 G/DL (ref 3.5–5.2)
ALBUMIN/GLOB SERPL: 0.9 G/DL
ALP SERPL-CCNC: 83 U/L (ref 39–117)
ALT SERPL W P-5'-P-CCNC: 26 U/L (ref 1–41)
ANION GAP SERPL CALCULATED.3IONS-SCNC: 9.2 MMOL/L (ref 5–15)
AST SERPL-CCNC: 22 U/L (ref 1–40)
BASOPHILS # BLD AUTO: 0.08 10*3/MM3 (ref 0–0.2)
BASOPHILS NFR BLD AUTO: 0.5 % (ref 0–1.5)
BILIRUB SERPL-MCNC: 0.5 MG/DL (ref 0–1.2)
BUN SERPL-MCNC: 19 MG/DL (ref 6–20)
BUN/CREAT SERPL: 19.6 (ref 7–25)
CALCIUM SPEC-SCNC: 8.9 MG/DL (ref 8.6–10.5)
CHLORIDE SERPL-SCNC: 99 MMOL/L (ref 98–107)
CO2 SERPL-SCNC: 28.8 MMOL/L (ref 22–29)
CREAT SERPL-MCNC: 0.97 MG/DL (ref 0.76–1.27)
DEPRECATED RDW RBC AUTO: 48.2 FL (ref 37–54)
EGFRCR SERPLBLD CKD-EPI 2021: 97.5 ML/MIN/1.73
EOSINOPHIL # BLD AUTO: 0.19 10*3/MM3 (ref 0–0.4)
EOSINOPHIL NFR BLD AUTO: 1.3 % (ref 0.3–6.2)
ERYTHROCYTE [DISTWIDTH] IN BLOOD BY AUTOMATED COUNT: 14.6 % (ref 12.3–15.4)
GEN 5 1HR TROPONIN T REFLEX: 9 NG/L
GLOBULIN UR ELPH-MCNC: 4.2 GM/DL
GLUCOSE SERPL-MCNC: 162 MG/DL (ref 65–99)
HCT VFR BLD AUTO: 49.2 % (ref 37.5–51)
HGB BLD-MCNC: 15.6 G/DL (ref 13–17.7)
IMM GRANULOCYTES # BLD AUTO: 0.07 10*3/MM3 (ref 0–0.05)
IMM GRANULOCYTES NFR BLD AUTO: 0.5 % (ref 0–0.5)
LYMPHOCYTES # BLD AUTO: 1.56 10*3/MM3 (ref 0.7–3.1)
LYMPHOCYTES NFR BLD AUTO: 10.6 % (ref 19.6–45.3)
MAGNESIUM SERPL-MCNC: 1.9 MG/DL (ref 1.6–2.6)
MCH RBC QN AUTO: 28.6 PG (ref 26.6–33)
MCHC RBC AUTO-ENTMCNC: 31.7 G/DL (ref 31.5–35.7)
MCV RBC AUTO: 90.3 FL (ref 79–97)
MONOCYTES # BLD AUTO: 1.02 10*3/MM3 (ref 0.1–0.9)
MONOCYTES NFR BLD AUTO: 6.9 % (ref 5–12)
NEUTROPHILS NFR BLD AUTO: 11.86 10*3/MM3 (ref 1.7–7)
NEUTROPHILS NFR BLD AUTO: 80.2 % (ref 42.7–76)
NRBC BLD AUTO-RTO: 0 /100 WBC (ref 0–0.2)
NT-PROBNP SERPL-MCNC: <36 PG/ML (ref 0–450)
PLATELET # BLD AUTO: 253 10*3/MM3 (ref 140–450)
PMV BLD AUTO: 11.6 FL (ref 6–12)
POTASSIUM SERPL-SCNC: 3.4 MMOL/L (ref 3.5–5.2)
PROT SERPL-MCNC: 8.1 G/DL (ref 6–8.5)
RBC # BLD AUTO: 5.45 10*6/MM3 (ref 4.14–5.8)
SODIUM SERPL-SCNC: 137 MMOL/L (ref 136–145)
TROPONIN T NUMERIC DELTA: 0 NG/L
TROPONIN T SERPL HS-MCNC: 9 NG/L
TSH SERPL DL<=0.05 MIU/L-ACNC: 0.69 UIU/ML (ref 0.27–4.2)
WBC NRBC COR # BLD AUTO: 14.78 10*3/MM3 (ref 3.4–10.8)

## 2025-04-17 PROCEDURE — 93306 TTE W/DOPPLER COMPLETE: CPT | Performed by: INTERNAL MEDICINE

## 2025-04-17 PROCEDURE — G0378 HOSPITAL OBSERVATION PER HR: HCPCS

## 2025-04-17 PROCEDURE — 83880 ASSAY OF NATRIURETIC PEPTIDE: CPT

## 2025-04-17 PROCEDURE — 96376 TX/PRO/DX INJ SAME DRUG ADON: CPT

## 2025-04-17 PROCEDURE — 93306 TTE W/DOPPLER COMPLETE: CPT

## 2025-04-17 PROCEDURE — 94799 UNLISTED PULMONARY SVC/PX: CPT

## 2025-04-17 PROCEDURE — 71045 X-RAY EXAM CHEST 1 VIEW: CPT

## 2025-04-17 PROCEDURE — 85025 COMPLETE CBC W/AUTO DIFF WBC: CPT

## 2025-04-17 PROCEDURE — 99285 EMERGENCY DEPT VISIT HI MDM: CPT

## 2025-04-17 PROCEDURE — 84443 ASSAY THYROID STIM HORMONE: CPT

## 2025-04-17 PROCEDURE — 25010000002 ONDANSETRON PER 1 MG

## 2025-04-17 PROCEDURE — 94640 AIRWAY INHALATION TREATMENT: CPT

## 2025-04-17 PROCEDURE — 93005 ELECTROCARDIOGRAM TRACING: CPT

## 2025-04-17 PROCEDURE — 70450 CT HEAD/BRAIN W/O DYE: CPT

## 2025-04-17 PROCEDURE — 73030 X-RAY EXAM OF SHOULDER: CPT

## 2025-04-17 PROCEDURE — 96375 TX/PRO/DX INJ NEW DRUG ADDON: CPT

## 2025-04-17 PROCEDURE — 80053 COMPREHEN METABOLIC PANEL: CPT

## 2025-04-17 PROCEDURE — 84484 ASSAY OF TROPONIN QUANT: CPT

## 2025-04-17 PROCEDURE — 93005 ELECTROCARDIOGRAM TRACING: CPT | Performed by: FAMILY MEDICINE

## 2025-04-17 PROCEDURE — 25010000002 MORPHINE PER 10 MG

## 2025-04-17 PROCEDURE — 25010000002 HYDROMORPHONE 1 MG/ML SOLUTION

## 2025-04-17 PROCEDURE — 25010000002 MORPHINE PER 10 MG: Performed by: FAMILY MEDICINE

## 2025-04-17 PROCEDURE — 72125 CT NECK SPINE W/O DYE: CPT

## 2025-04-17 PROCEDURE — 83735 ASSAY OF MAGNESIUM: CPT

## 2025-04-17 PROCEDURE — 25010000002 SULFUR HEXAFLUORIDE MICROSPH 60.7-25 MG RECONSTITUTED SUSPENSION: Performed by: FAMILY MEDICINE

## 2025-04-17 PROCEDURE — 96374 THER/PROPH/DIAG INJ IV PUSH: CPT

## 2025-04-17 PROCEDURE — 36415 COLL VENOUS BLD VENIPUNCTURE: CPT

## 2025-04-17 RX ORDER — SERTRALINE HYDROCHLORIDE 25 MG/1
25 TABLET, FILM COATED ORAL DAILY
Status: DISCONTINUED | OUTPATIENT
Start: 2025-04-17 | End: 2025-04-18 | Stop reason: HOSPADM

## 2025-04-17 RX ORDER — ACETAMINOPHEN 160 MG/5ML
650 SOLUTION ORAL EVERY 4 HOURS PRN
Status: DISCONTINUED | OUTPATIENT
Start: 2025-04-17 | End: 2025-04-18 | Stop reason: HOSPADM

## 2025-04-17 RX ORDER — PANTOPRAZOLE SODIUM 40 MG/1
40 TABLET, DELAYED RELEASE ORAL 2 TIMES DAILY
Status: DISCONTINUED | OUTPATIENT
Start: 2025-04-17 | End: 2025-04-18 | Stop reason: HOSPADM

## 2025-04-17 RX ORDER — OXYCODONE HYDROCHLORIDE 5 MG/1
5 TABLET ORAL EVERY 4 HOURS PRN
Status: DISCONTINUED | OUTPATIENT
Start: 2025-04-17 | End: 2025-04-18 | Stop reason: HOSPADM

## 2025-04-17 RX ORDER — POTASSIUM CHLORIDE 1500 MG/1
20 TABLET, EXTENDED RELEASE ORAL DAILY
Status: DISCONTINUED | OUTPATIENT
Start: 2025-04-18 | End: 2025-04-18 | Stop reason: HOSPADM

## 2025-04-17 RX ORDER — POTASSIUM CHLORIDE 1500 MG/1
40 TABLET, EXTENDED RELEASE ORAL EVERY 4 HOURS
Status: COMPLETED | OUTPATIENT
Start: 2025-04-17 | End: 2025-04-17

## 2025-04-17 RX ORDER — NALOXONE HCL 0.4 MG/ML
0.4 VIAL (ML) INJECTION
Status: DISCONTINUED | OUTPATIENT
Start: 2025-04-17 | End: 2025-04-18 | Stop reason: HOSPADM

## 2025-04-17 RX ORDER — SODIUM CHLORIDE 0.9 % (FLUSH) 0.9 %
10 SYRINGE (ML) INJECTION EVERY 12 HOURS SCHEDULED
Status: DISCONTINUED | OUTPATIENT
Start: 2025-04-17 | End: 2025-04-18 | Stop reason: HOSPADM

## 2025-04-17 RX ORDER — SODIUM CHLORIDE 9 MG/ML
40 INJECTION, SOLUTION INTRAVENOUS AS NEEDED
Status: DISCONTINUED | OUTPATIENT
Start: 2025-04-17 | End: 2025-04-18 | Stop reason: HOSPADM

## 2025-04-17 RX ORDER — ONDANSETRON 2 MG/ML
4 INJECTION INTRAMUSCULAR; INTRAVENOUS ONCE
Status: COMPLETED | OUTPATIENT
Start: 2025-04-17 | End: 2025-04-17

## 2025-04-17 RX ORDER — ACETAMINOPHEN 650 MG/1
650 SUPPOSITORY RECTAL EVERY 4 HOURS PRN
Status: DISCONTINUED | OUTPATIENT
Start: 2025-04-17 | End: 2025-04-18 | Stop reason: HOSPADM

## 2025-04-17 RX ORDER — ONDANSETRON 4 MG/1
4 TABLET, ORALLY DISINTEGRATING ORAL EVERY 6 HOURS PRN
Status: DISCONTINUED | OUTPATIENT
Start: 2025-04-17 | End: 2025-04-18 | Stop reason: HOSPADM

## 2025-04-17 RX ORDER — POTASSIUM CHLORIDE 1500 MG/1
40 TABLET, EXTENDED RELEASE ORAL ONCE
Status: COMPLETED | OUTPATIENT
Start: 2025-04-17 | End: 2025-04-17

## 2025-04-17 RX ORDER — POLYETHYLENE GLYCOL 3350 17 G/17G
17 POWDER, FOR SOLUTION ORAL DAILY PRN
Status: DISCONTINUED | OUTPATIENT
Start: 2025-04-17 | End: 2025-04-18 | Stop reason: HOSPADM

## 2025-04-17 RX ORDER — HYDRALAZINE HYDROCHLORIDE 25 MG/1
25 TABLET, FILM COATED ORAL 3 TIMES DAILY
Status: DISCONTINUED | OUTPATIENT
Start: 2025-04-17 | End: 2025-04-18 | Stop reason: HOSPADM

## 2025-04-17 RX ORDER — CARVEDILOL 25 MG/1
25 TABLET ORAL 2 TIMES DAILY WITH MEALS
Status: DISCONTINUED | OUTPATIENT
Start: 2025-04-17 | End: 2025-04-18 | Stop reason: HOSPADM

## 2025-04-17 RX ORDER — AMOXICILLIN 250 MG
2 CAPSULE ORAL 2 TIMES DAILY PRN
Status: DISCONTINUED | OUTPATIENT
Start: 2025-04-17 | End: 2025-04-18 | Stop reason: HOSPADM

## 2025-04-17 RX ORDER — BISACODYL 10 MG
10 SUPPOSITORY, RECTAL RECTAL DAILY PRN
Status: DISCONTINUED | OUTPATIENT
Start: 2025-04-17 | End: 2025-04-18 | Stop reason: HOSPADM

## 2025-04-17 RX ORDER — SODIUM CHLORIDE 0.9 % (FLUSH) 0.9 %
10 SYRINGE (ML) INJECTION AS NEEDED
Status: DISCONTINUED | OUTPATIENT
Start: 2025-04-17 | End: 2025-04-18 | Stop reason: HOSPADM

## 2025-04-17 RX ORDER — TORSEMIDE 100 MG/1
100 TABLET ORAL DAILY
Status: DISCONTINUED | OUTPATIENT
Start: 2025-04-17 | End: 2025-04-18 | Stop reason: HOSPADM

## 2025-04-17 RX ORDER — ONDANSETRON 2 MG/ML
4 INJECTION INTRAMUSCULAR; INTRAVENOUS EVERY 6 HOURS PRN
Status: DISCONTINUED | OUTPATIENT
Start: 2025-04-17 | End: 2025-04-18 | Stop reason: HOSPADM

## 2025-04-17 RX ORDER — BISACODYL 5 MG/1
5 TABLET, DELAYED RELEASE ORAL DAILY PRN
Status: DISCONTINUED | OUTPATIENT
Start: 2025-04-17 | End: 2025-04-18 | Stop reason: HOSPADM

## 2025-04-17 RX ORDER — ACETAMINOPHEN 325 MG/1
650 TABLET ORAL EVERY 4 HOURS PRN
Status: DISCONTINUED | OUTPATIENT
Start: 2025-04-17 | End: 2025-04-18 | Stop reason: HOSPADM

## 2025-04-17 RX ORDER — BUDESONIDE AND FORMOTEROL FUMARATE DIHYDRATE 160; 4.5 UG/1; UG/1
2 AEROSOL RESPIRATORY (INHALATION)
Status: DISCONTINUED | OUTPATIENT
Start: 2025-04-17 | End: 2025-04-18 | Stop reason: HOSPADM

## 2025-04-17 RX ADMIN — OXYCODONE 5 MG: 5 TABLET ORAL at 21:46

## 2025-04-17 RX ADMIN — TORSEMIDE 100 MG: 100 TABLET ORAL at 18:39

## 2025-04-17 RX ADMIN — OXYCODONE 5 MG: 5 TABLET ORAL at 15:19

## 2025-04-17 RX ADMIN — PANTOPRAZOLE SODIUM 40 MG: 40 TABLET, DELAYED RELEASE ORAL at 20:20

## 2025-04-17 RX ADMIN — ONDANSETRON 4 MG: 2 INJECTION, SOLUTION INTRAMUSCULAR; INTRAVENOUS at 10:14

## 2025-04-17 RX ADMIN — HYDRALAZINE HYDROCHLORIDE 25 MG: 25 TABLET ORAL at 20:20

## 2025-04-17 RX ADMIN — HYDRALAZINE HYDROCHLORIDE 25 MG: 25 TABLET ORAL at 15:18

## 2025-04-17 RX ADMIN — SERTRALINE HYDROCHLORIDE 25 MG: 50 TABLET, FILM COATED ORAL at 15:18

## 2025-04-17 RX ADMIN — POTASSIUM CHLORIDE 40 MEQ: 20 TABLET, EXTENDED RELEASE ORAL at 12:22

## 2025-04-17 RX ADMIN — APIXABAN 5 MG: 5 TABLET, FILM COATED ORAL at 20:20

## 2025-04-17 RX ADMIN — POTASSIUM CHLORIDE 40 MEQ: 1500 TABLET, EXTENDED RELEASE ORAL at 18:39

## 2025-04-17 RX ADMIN — MORPHINE SULFATE 4 MG: 4 INJECTION, SOLUTION INTRAMUSCULAR; INTRAVENOUS at 10:14

## 2025-04-17 RX ADMIN — Medication 10 ML: at 15:20

## 2025-04-17 RX ADMIN — MORPHINE SULFATE 3 MG: 4 INJECTION, SOLUTION INTRAMUSCULAR; INTRAVENOUS at 20:20

## 2025-04-17 RX ADMIN — MORPHINE SULFATE 3 MG: 4 INJECTION, SOLUTION INTRAMUSCULAR; INTRAVENOUS at 23:19

## 2025-04-17 RX ADMIN — SULFUR HEXAFLUORIDE 2 ML: KIT at 22:31

## 2025-04-17 RX ADMIN — HYDROMORPHONE HYDROCHLORIDE 0.5 MG: 1 INJECTION, SOLUTION INTRAMUSCULAR; INTRAVENOUS; SUBCUTANEOUS at 11:55

## 2025-04-17 RX ADMIN — ONDANSETRON 4 MG: 2 INJECTION, SOLUTION INTRAMUSCULAR; INTRAVENOUS at 11:55

## 2025-04-17 RX ADMIN — Medication 10 ML: at 20:20

## 2025-04-17 RX ADMIN — BUDESONIDE AND FORMOTEROL FUMARATE DIHYDRATE 2 PUFF: 160; 4.5 AEROSOL RESPIRATORY (INHALATION) at 18:38

## 2025-04-17 RX ADMIN — POTASSIUM CHLORIDE 40 MEQ: 1500 TABLET, EXTENDED RELEASE ORAL at 23:19

## 2025-04-17 RX ADMIN — CARVEDILOL 25 MG: 6.25 TABLET, FILM COATED ORAL at 18:39

## 2025-04-17 RX ADMIN — MORPHINE SULFATE 3 MG: 4 INJECTION, SOLUTION INTRAMUSCULAR; INTRAVENOUS at 16:32

## 2025-04-17 NOTE — Clinical Note
Level of Care: Med/Surg [1]   Admitting Physician: CEFERINO GARCIA [[id number]]   Attending Physician: CEFERINO GARCIA [[id number]]

## 2025-04-17 NOTE — H&P
[facility] Medicine Services  History & Physical    Patient Name: Farhat Hein  : 1980  MRN: [id number]  Primary Care Physician:  Farhat Allred DO  Date of admission: 2025  Date and Time of Service: 2025     Subjective      Chief Complaint: Dizziness; fall; left shoulder pain     History of Present Illness: Farhat Hein is a 45 y.o. male with a PMHx of HTN, COPD, GERD, DM II, and hx of DVTs with factor V Leiden mutation on Eliquis, who presented to [facility] on 2025 with  left shoulder pain after a fall.  Patient says around 6:45 AM today, he got up, started to feel dizzy and lightheaded, and fell, landing on his left shoulder.  He had a hard time getting up, but eventually, with help of several others, he was about to get to his feet.  He had intense achy to sharp pain in left shoulder, worse with movement.  He denies feeling bad or having dizziness/lightheadedness prior to today's event.  He is on 3 different BP meds (Coreg, HCTZ, Hydralazine) and says he has been on these meds at these doses for a while with no recent med dose changes.  He did recently complete a course of antibiotics for a right foot wound, and plans to follow-up with wound care soon.  He denies any cough, fevers, chills, nausea, vomiting, diarrhea, headaches, blurry vision, gait imbalance, chest or abdominal pain.        Review of Systems  All other ROS negative except for what was stated up in HPI    Personal History     Past Medical History:   Diagnosis Date    Allergic 1979    seasonal    Arthritis 2020    legs    Asthma 2024    Chronic obstructive pulmonary disease 2023    COPD (chronic obstructive pulmonary disease) 2011    GERD (gastroesophageal reflux disease) 2006    Hypertension 2006    Kidney stone 2010    Low back pain 2020    Obesity 1997    Pneumonia 2023    Renal insufficiency 2021    Stroke 2015     Substance abuse     Type 2 diabetes mellitus 07/27/2019    Urinary tract infection 03/15/2020       Past Surgical History:   Procedure Laterality Date    APPENDECTOMY  03/2017    CHOLECYSTECTOMY  02/2016       Family History: family history includes Alcohol abuse in his brother, brother, and father; Arthritis in his brother, father, mother, and sister; COPD in his mother; Cancer in his brother, father, maternal grandmother, maternal uncle, sister, and sister; Diabetes in his brother, father, mother, sister, and sister; Heart disease in his brother, father, and sister; Hyperlipidemia in his brother, brother, father, mother, sister, and sister; Hypertension in his brother, brother, father, mother, sister, and sister; Kidney disease in his brother; Mental illness in his brother; Obesity in his father and sister; Sleep apnea in his sister. Otherwise pertinent FHx was reviewed and not pertinent to current issue.    Social History:  reports that he quit smoking about 16 years ago. His smoking use included cigarettes. He started smoking about 27 years ago. He has a 20.8 pack-year smoking history. He has been exposed to tobacco smoke. He has never used smokeless tobacco. He reports that he does not currently use alcohol after a past usage of about 12.0 standard drinks of alcohol per week. He reports current drug use. Frequency: 7.00 times per week. Drug: Marijuana.    Home Medications:  Prior to Admission Medications       Prescriptions Last Dose Informant Patient Reported? Taking?    Eliquis 5 MG tablet tablet 4/16/2025  No Yes    TAKE 1 TABLET BY MOUTH 2 TIMES A DAY    vitamin D3 125 MCG (5000 UT) capsule capsule   No Yes    Take 1 capsule by mouth Daily.    albuterol sulfate  (90 Base) MCG/ACT inhaler   No No    Inhale 2 puffs Every 4 (Four) Hours As Needed for Wheezing.    budesonide-formoterol (Symbicort) 80-4.5 MCG/ACT inhaler   No No    Inhale 2 puffs 2 (Two) Times a Day.    carvedilol (COREG) 25 MG tablet    No No    Take 1 tablet by mouth 2 (Two) Times a Day With Meals.    hydrALAZINE (APRESOLINE) 25 MG tablet   No No    TAKE 1 TABLET BY MOUTH 3 TIMES A DAY    hydroCHLOROthiazide 25 MG tablet   No No    TAKE 1 TABLET BY MOUTH DAILY    multivitamin (THERAGRAN) tablet tablet   Yes No    Take 1 tablet by mouth Daily. mens    pantoprazole (PROTONIX) 40 MG EC tablet   Yes No    Take 1 tablet by mouth 2 (Two) Times a Day.    potassium chloride (MICRO-K) 10 MEQ CR capsule   No No    TAKE 1 CAPSULE BY MOUTH DAILY    rOPINIRole (REQUIP) 0.25 MG tablet   Yes No    Take 1 tablet by mouth Every Night. Take 1 hour before bedtime.    sertraline (Zoloft) 25 MG tablet   No No    Take 1 tablet by mouth Daily.    torsemide (DEMADEX) 100 MG tablet   No No    TAKE 1 TABLET BY MOUTH DAILY              Allergies:  Allergies   Allergen Reactions    Promethazine Itching    Vancomycin Anaphylaxis    Calamine-Zinc Oxide Itching and Rash       Objective      Vitals:   Temp:  [97.5 °F (36.4 °C)] 97.5 °F (36.4 °C)  Heart Rate:  [72-97] 74  Resp:  [16-20] 20  BP: (108-133)/(64-87) 133/87  Body mass index is 64.16 kg/m².  Physical Exam  General: Sitting up on side of bed; NAD  CV: RRR, S1-S2  Lungs: CTA bilaterally  Abdomen: Morbidly obese, soft, NT, ND   MS:   left arm in sling; + tenderness to palpation of left shoulder   Neuro:  CN II-XII intact; no focal deficits     Diagnostic Data:  Lab Results (last 24 hours)       Procedure Component Value Units Date/Time    High Sensitivity Troponin T 1Hr [[id number]]  (Normal) Collected: 04/17/25 1120    Specimen: Blood from Arm, Right Updated: 04/17/25 1200     HS Troponin T 9 ng/L      Troponin T Numeric Delta 0 ng/L     Narrative:      High Sensitive Troponin T Reference Range:  <14.0 ng/L- Negative Female for AMI  <22.0 ng/L- Negative Male for AMI  >=14 - Abnormal Female indicating possible myocardial injury.  >=22 - Abnormal Male indicating possible myocardial injury.   Clinicians would have to utilize  clinical acumen, EKG, Troponin, and serial changes to determine if it is an Acute Myocardial Infarction or myocardial injury due to an underlying chronic condition.         Comprehensive Metabolic Panel [[id number]]  (Abnormal) Collected: 04/17/25 1034    Specimen: Blood from Arm, Right Updated: 04/17/25 1109     Glucose 162 mg/dL      BUN 19 mg/dL      Creatinine 0.97 mg/dL      Sodium 137 mmol/L      Potassium 3.4 mmol/L      Chloride 99 mmol/L      CO2 28.8 mmol/L      Calcium 8.9 mg/dL      Total Protein 8.1 g/dL      Albumin 3.9 g/dL      ALT (SGPT) 26 U/L      AST (SGOT) 22 U/L      Alkaline Phosphatase 83 U/L      Total Bilirubin 0.5 mg/dL      Globulin 4.2 gm/dL      A/G Ratio 0.9 g/dL      BUN/Creatinine Ratio 19.6     Anion Gap 9.2 mmol/L      eGFR 97.5 mL/min/1.73     Narrative:      GFR Categories in Chronic Kidney Disease (CKD)      GFR Category          GFR (mL/min/1.73)    Interpretation  G1                     90 or greater         Normal or high (1)  G2                      60-89                Mild decrease (1)  G3a                   45-59                Mild to moderate decrease  G3b                   30-44                Moderate to severe decrease  G4                    15-29                Severe decrease  G5                    14 or less           Kidney failure          (1)In the absence of evidence of kidney disease, neither GFR category G1 or G2 fulfill the criteria for CKD.    eGFR calculation 2021 CKD-EPI creatinine equation, which does not include race as a factor    High Sensitivity Troponin T [[id number]]  (Normal) Collected: 04/17/25 1034    Specimen: Blood from Arm, Right Updated: 04/17/25 1109     HS Troponin T 9 ng/L     Narrative:      High Sensitive Troponin T Reference Range:  <14.0 ng/L- Negative Female for AMI  <22.0 ng/L- Negative Male for AMI  >=14 - Abnormal Female indicating possible myocardial injury.  >=22 - Abnormal Male indicating possible myocardial injury.   Clinicians  would have to utilize clinical acumen, EKG, Troponin, and serial changes to determine if it is an Acute Myocardial Infarction or myocardial injury due to an underlying chronic condition.         Magnesium [[id number]]  (Normal) Collected: 04/17/25 1034    Specimen: Blood from Arm, Right Updated: 04/17/25 1109     Magnesium 1.9 mg/dL     BNP [[id number]]  (Normal) Collected: 04/17/25 0937    Specimen: Blood from Arm, Right Updated: 04/17/25 1017     proBNP <36.0 pg/mL     Narrative:      This assay is used as an aid in the diagnosis of individuals suspected of having heart failure. It can be used as an aid in the diagnosis of acute decompensated heart failure (ADHF) in patients presenting with signs and symptoms of ADHF to the emergency department (ED). In addition, NT-proBNP of <300 pg/mL indicates ADHF is not likely.    Age Range Result Interpretation  NT-proBNP Concentration (pg/mL:      <50             Positive            >450                   Gray                 300-450                    Negative             <300    50-75           Positive            >900                  Gray                300-900                  Negative            <300      >75             Positive            >1800                  Gray                300-1800                  Negative            <300    TSH Rfx On Abnormal To Free T4 [[id number]]  (Normal) Collected: 04/17/25 0937    Specimen: Blood from Arm, Right Updated: 04/17/25 1017     TSH 0.694 uIU/mL     CBC & Differential [[id number]]  (Abnormal) Collected: 04/17/25 0937    Specimen: Blood from Arm, Right Updated: 04/17/25 0948    Narrative:      The following orders were created for panel order CBC & Differential.  Procedure                               Abnormality         Status                     ---------                               -----------         ------                     CBC Auto Differential[[id number]]        Abnormal            Final result                 Please  view results for these tests on the individual orders.    CBC Auto Differential [[id number]]  (Abnormal) Collected: 04/17/25 0937    Specimen: Blood from Arm, Right Updated: 04/17/25 0948     WBC 14.78 10*3/mm3      RBC 5.45 10*6/mm3      Hemoglobin 15.6 g/dL      Hematocrit 49.2 %      MCV 90.3 fL      MCH 28.6 pg      MCHC 31.7 g/dL      RDW 14.6 %      RDW-SD 48.2 fl      MPV 11.6 fL      Platelets 253 10*3/mm3      Neutrophil % 80.2 %      Lymphocyte % 10.6 %      Monocyte % 6.9 %      Eosinophil % 1.3 %      Basophil % 0.5 %      Immature Grans % 0.5 %      Neutrophils, Absolute 11.86 10*3/mm3      Lymphocytes, Absolute 1.56 10*3/mm3      Monocytes, Absolute 1.02 10*3/mm3      Eosinophils, Absolute 0.19 10*3/mm3      Basophils, Absolute 0.08 10*3/mm3      Immature Grans, Absolute 0.07 10*3/mm3      nRBC 0.0 /100 WBC              Imaging Results (Last 24 Hours)       Procedure Component Value Units Date/Time    CT Head Without Contrast [[id number]] Collected: 04/17/25 1016     Updated: 04/17/25 1054    Narrative:      CT HEAD WO CONTRAST, CT CERVICAL SPINE WO CONTRAST    Date of Exam: 4/17/2025 10:02 AM EDT    Indication: fall.    Comparison: None available.    Technique: Axial CT images were obtained of the head and cervical spine without contrast administration.  Coronal reconstructions were performed.  Automated exposure control and iterative reconstruction methods were used.    Findings:  Head:  No intracranial hemorrhage. No mass effect or midline shift. No abnormal extra-axial fluid collection. Gray-white matter differentiation maintained. The posterior fossa is without acute abnormality. The mastoid air cells are well-aerated. Motion   partially limits assessment. Calvarium intact.    Cervical spine:  Patient habitus and positioning limits field-of-view of the cervical spine with the lower cervical spine at the C6 and C7 levels incompletely assessed. The cervical spine is visualized to the C5-6 level. The  visualized posterior spinous processes are   intact. The dens is intact. The occipital condyles are intact bilaterally. No locked or perched facet. Visualized portions of the mandible are intact.    Disc narrowing in the lower cervical spine with posterior disc osteophyte noted at C5-6 and C6-7 contributing to likely mild central canal stenosis. Uncovertebral spurring and facet arthritis contributes to multilevel foraminal stenosis most severe on   the left at the C3-4 level. Moderate foraminal stenosis noted bilaterally at C4-5 and on the right at C5-6. Within limits of motion the noncontrast soft tissues of the neck are without acute abnormality.      Impression:      Impression:  Head:  No acute intracranial abnormality.    Cervical spine:  1. No cervical spine fracture within limited field-of-view of the exam. Cervical spine only visualized to C5-6 level due to habitus and difficulty with positioning. If there is further concern for lower cervical spine fracture, cervical radiographs may   be helpful.  2. Cervical spondylosis above.      Electronically Signed: Timothy Soriano MD    4/17/2025 10:51 AM EDT    Workstation ID: [id number]    CT Cervical Spine Without Contrast [[id number]] Collected: 04/17/25 1016     Updated: 04/17/25 1054    Narrative:      CT HEAD WO CONTRAST, CT CERVICAL SPINE WO CONTRAST    Date of Exam: 4/17/2025 10:02 AM EDT    Indication: fall.    Comparison: None available.    Technique: Axial CT images were obtained of the head and cervical spine without contrast administration.  Coronal reconstructions were performed.  Automated exposure control and iterative reconstruction methods were used.    Findings:  Head:  No intracranial hemorrhage. No mass effect or midline shift. No abnormal extra-axial fluid collection. Gray-white matter differentiation maintained. The posterior fossa is without acute abnormality. The mastoid air cells are well-aerated. Motion   partially limits assessment. Calvarium  intact.    Cervical spine:  Patient habitus and positioning limits field-of-view of the cervical spine with the lower cervical spine at the C6 and C7 levels incompletely assessed. The cervical spine is visualized to the C5-6 level. The visualized posterior spinous processes are   intact. The dens is intact. The occipital condyles are intact bilaterally. No locked or perched facet. Visualized portions of the mandible are intact.    Disc narrowing in the lower cervical spine with posterior disc osteophyte noted at C5-6 and C6-7 contributing to likely mild central canal stenosis. Uncovertebral spurring and facet arthritis contributes to multilevel foraminal stenosis most severe on   the left at the C3-4 level. Moderate foraminal stenosis noted bilaterally at C4-5 and on the right at C5-6. Within limits of motion the noncontrast soft tissues of the neck are without acute abnormality.      Impression:      Impression:  Head:  No acute intracranial abnormality.    Cervical spine:  1. No cervical spine fracture within limited field-of-view of the exam. Cervical spine only visualized to C5-6 level due to habitus and difficulty with positioning. If there is further concern for lower cervical spine fracture, cervical radiographs may   be helpful.  2. Cervical spondylosis above.      Electronically Signed: Timothy Soriano MD    4/17/2025 10:51 AM EDT    Workstation ID: [id number]    XR Shoulder 2+ View Left [[id number]] Collected: 04/17/25 1028     Updated: 04/17/25 1033    Narrative:      XR SHOULDER 2+ VW LEFT    Date of Exam: 4/17/2025 10:10 AM EDT    Indication: fall    Comparison: None available.    Findings:  Nondisplaced vertically oriented fracture of the greater tuberosity of the left humerus. Questionable nondisplaced transversely logic fracture of the left humeral neck. There is no shoulder dislocation. There is moderate degenerative change of the left   acromioclavicular joint. Imaged left ribs appear intact.       Impression:      Impression:  Questionable nondisplaced transversely oriented fracture of the left humeral neck.  Nondisplaced fracture of the left humeral greater tuberosity.  No dislocation.  Moderate degenerative change of the left AC joint.      Electronically Signed: Kianna Cabrera MD    4/17/2025 10:31 AM EDT    Workstation ID: [id number]    XR Chest 1 View [[id number]] Collected: 04/17/25 1030     Updated: 04/17/25 1033    Narrative:      XR CHEST 1 VW    Date of Exam: 4/17/2025 10:10 AM EDT    Indication: chest pain    Comparison: 12/26/2023    Findings:  Cardiomediastinal silhouette is unremarkable.  No airspace disease, pneumothorax, nor pleural effusion. No acute osseous abnormality identified.      Impression:      Impression:  No active disease.        Electronically Signed: Figueroa Cross MD    4/17/2025 10:30 AM EDT    Workstation ID: [id number]              Assessment & Plan        This is a 45 y.o. male with:    Active and Resolved Problems  Active Hospital Problems    Diagnosis  POA    **Humeral surgical neck fracture [S42.213A]  Yes      Resolved Hospital Problems   No resolved problems to display.       Left humeral neck fracture  Noted on X-ray; non-displaced  Admit for pain control; start prn IV morphine and roxicodone  Consult ortho for further recs on management    Dizziness/fall   3 BP meds  BP was low normal in ED, but now elevated  Check orthostatics  Monitor on telemetry   Consider ZioPatch at d/c pending clinical course       Leukocytosis  ? Stress from fall  No signs of active infection   Monitor off Abx    Hx of DVT/factor V Leiden  Continue Eliquis    HTN  BP back up on my exam  Resume home hydralazine, Coreg, and Torsemide; hold HCTZ  Await orthostatics  Adjust doses as needed       Hypokalemia  Replete       Morbid obesity   Complicates all aspects of care         VTE Prophylaxis:  Mechanical VTE prophylaxis orders are signed & held.          The patient desires to be as follows:    CODE  STATUS:    Code Status (Patient has no pulse and is not breathing): CPR (Attempt to Resuscitate)  Medical Interventions (Patient has pulse or is breathing): Full Support          Admission Status:  I believe this patient meets Observation status.    Expected Length of Stay: <2 midnights     PDMP and Medication Dispenses via Sidebar reviewed and consistent with patient reported medications.    I discussed the patient's findings and my recommendations with patient.      Signature:     This document has been electronically signed by Harlan Cerna DO on April 17, 2025 12:07 EDT   [facility]ist Team

## 2025-04-17 NOTE — SIGNIFICANT NOTE
04/17/25 1428   OTHER   Discipline physical therapy assistant;occupational therapist   Rehab Time/Intention   Session Not Performed unable to evaluate, medical status change  (awaiting ortho consult - will eval as able)   Therapy Assessment/Plan (PT)   Criteria for Skilled Interventions Met (PT) yes;skilled treatment is necessary   Recommendation   PT - Next Appointment 04/18/25

## 2025-04-17 NOTE — ED PROVIDER NOTES
Subjective     Chief Complaint   Patient presents with    Dizziness     History of Present Illness  Chief complaint: Shoulder pain, dizziness, fall    Context: Patient is 45-year-old  male with history of hypertension, COPD, arthritis, obesity, DM2, and stroke (1/16/2016), who presents to the emergency department with complaints of dizziness, which caused a fall and the resulting left shoulder pain.  Patient describes dizziness as more like feeling lightheaded than feeling as if he or the room were spinning, states this feeling is still somewhat present at time of primary assessment.  Patient states unknown LOC and unknown head impact; denies any pain or known signs of injury to head. Patient describes pain as starting from his left shoulder and radiating down his arm.  States some perceived loss of sensation, but does not describe full numbness or tingling    PCP: Farhat Allred DO    Review of Systems   Neurological:  Positive for dizziness.     Past Medical History:   Diagnosis Date    Allergic 1979    seasonal    Arthritis 01/05/2020    legs    Asthma 01/2024    Chronic obstructive pulmonary disease 07/17/2023    COPD (chronic obstructive pulmonary disease) 04/01/2011    GERD (gastroesophageal reflux disease) 06/06/2006    Hypertension 06/06/2006    Kidney stone 08/08/2010    Low back pain 01/01/2020    Obesity 01/21/1997    Pneumonia 11/2023    Renal insufficiency 03/2021    Stroke 01/21/2015 01/21/2016    Substance abuse     Type 2 diabetes mellitus 07/27/2019    Urinary tract infection 03/15/2020     Allergies   Allergen Reactions    Promethazine Itching    Vancomycin Anaphylaxis    Calamine-Zinc Oxide Itching and Rash     Past Surgical History:   Procedure Laterality Date    APPENDECTOMY  03/2017    CHOLECYSTECTOMY  02/2016     Family History   Problem Relation Age of Onset    Arthritis Mother     COPD Mother     Diabetes Mother     Hyperlipidemia Mother     Hypertension Mother      Alcohol abuse Father     Arthritis Father     Cancer Father         SKIN    Diabetes Father     Heart disease Father     Hyperlipidemia Father     Hypertension Father     Obesity Father     Arthritis Sister     Cancer Sister         BREAST    Diabetes Sister     Hyperlipidemia Sister     Heart disease Sister     Hypertension Sister     Obesity Sister     Sleep apnea Sister     Arthritis Brother     Cancer Brother         KIDNEY/BRAIN/SKIN    Hyperlipidemia Brother     Kidney disease Brother     Hypertension Brother     Alcohol abuse Brother     Mental illness Brother         schizophrenia    Cancer Sister         COLON    Diabetes Sister             Hyperlipidemia Sister     Hypertension Sister     Diabetes Brother     Heart disease Brother     Hyperlipidemia Brother     Hypertension Brother     Alcohol abuse Brother     Cancer Maternal Grandmother     Cancer Maternal Uncle      Social History     Socioeconomic History    Marital status:    Tobacco Use    Smoking status: Former     Current packs/day: 0.00     Average packs/day: 2.0 packs/day for 10.4 years (20.8 ttl pk-yrs)     Types: Cigarettes     Start date: 1998     Quit date: 2008     Years since quittin.8     Passive exposure: Past    Smokeless tobacco: Never   Vaping Use    Vaping status: Never Used   Substance and Sexual Activity    Alcohol use: Not Currently     Alcohol/week: 12.0 standard drinks of alcohol     Types: 12 Cans of beer per week     Comment: PER DAY    Drug use: Yes     Frequency: 7.0 times per week     Types: Marijuana    Sexual activity: Yes     Partners: Female     Birth control/protection: Condom     Objective   Physical Exam  Vitals and nursing note reviewed.   Constitutional:       General: He is not in acute distress.     Appearance: He is obese. He is ill-appearing. He is not toxic-appearing.   HENT:      Head: Normocephalic and atraumatic.      Nose: No congestion or rhinorrhea.      Mouth/Throat:       "Mouth: Mucous membranes are moist.      Pharynx: Oropharynx is clear.   Eyes:      Extraocular Movements: Extraocular movements intact.   Cardiovascular:      Rate and Rhythm: Normal rate and regular rhythm.      Pulses: Normal pulses.      Heart sounds: Normal heart sounds.   Pulmonary:      Effort: Pulmonary effort is normal.      Breath sounds: Normal breath sounds.   Abdominal:      General: Bowel sounds are normal. There is no distension.      Palpations: Abdomen is soft. There is no mass.      Tenderness: There is no abdominal tenderness.   Musculoskeletal:         General: Tenderness and signs of injury present.      Right shoulder: Normal.      Left shoulder: Tenderness present. Decreased range of motion.      Right upper arm: Normal.      Left upper arm: Tenderness present.      Cervical back: Normal range of motion. No rigidity or tenderness.      Right lower leg: Edema present.      Left lower leg: Edema present.   Skin:     General: Skin is warm and dry.      Capillary Refill: Capillary refill takes less than 2 seconds.      Coloration: Skin is not jaundiced or pale.   Neurological:      General: No focal deficit present.      Mental Status: He is alert. Mental status is at baseline.   Psychiatric:         Mood and Affect: Mood normal.         Thought Content: Thought content normal.         Judgment: Judgment normal.     Procedures     ED Course  ED Course as of 04/17/25 1420   u Apr 17, 2025   1110 Dr. Glover spoke to pt and recommends admission to evaluate dizziness and to see ortho [RS]   1114 Hospitalist consulted for admission [RS]      ED Course User Index  [RS] Mango Watson, SHANTI                                     /87 (BP Location: Right arm, Patient Position: Lying)   Pulse 74   Temp 97.5 °F (36.4 °C) (Oral)   Resp 20   Ht 180.3 cm (71\")   Wt (!) 209 kg (460 lb)   SpO2 91%   BMI 64.16 kg/m²   Labs Reviewed   COMPREHENSIVE METABOLIC PANEL - Abnormal; Notable for the " following components:       Result Value    Glucose 162 (*)     Potassium 3.4 (*)     All other components within normal limits    Narrative:     GFR Categories in Chronic Kidney Disease (CKD)      GFR Category          GFR (mL/min/1.73)    Interpretation  G1                     90 or greater         Normal or high (1)  G2                      60-89                Mild decrease (1)  G3a                   45-59                Mild to moderate decrease  G3b                   30-44                Moderate to severe decrease  G4                    15-29                Severe decrease  G5                    14 or less           Kidney failure          (1)In the absence of evidence of kidney disease, neither GFR category G1 or G2 fulfill the criteria for CKD.    eGFR calculation 2021 CKD-EPI creatinine equation, which does not include race as a factor   CBC WITH AUTO DIFFERENTIAL - Abnormal; Notable for the following components:    WBC 14.78 (*)     Neutrophil % 80.2 (*)     Lymphocyte % 10.6 (*)     Neutrophils, Absolute 11.86 (*)     Monocytes, Absolute 1.02 (*)     Immature Grans, Absolute 0.07 (*)     All other components within normal limits   BNP (IN-HOUSE) - Normal    Narrative:     This assay is used as an aid in the diagnosis of individuals suspected of having heart failure. It can be used as an aid in the diagnosis of acute decompensated heart failure (ADHF) in patients presenting with signs and symptoms of ADHF to the emergency department (ED). In addition, NT-proBNP of <300 pg/mL indicates ADHF is not likely.    Age Range Result Interpretation  NT-proBNP Concentration (pg/mL:      <50             Positive            >450                   Gray                 300-450                    Negative             <300    50-75           Positive            >900                  Gray                300-900                  Negative            <300      >75             Positive            >1800                  Francis                 300-1800                  Negative            <300   TROPONIN - Normal    Narrative:     High Sensitive Troponin T Reference Range:  <14.0 ng/L- Negative Female for AMI  <22.0 ng/L- Negative Male for AMI  >=14 - Abnormal Female indicating possible myocardial injury.  >=22 - Abnormal Male indicating possible myocardial injury.   Clinicians would have to utilize clinical acumen, EKG, Troponin, and serial changes to determine if it is an Acute Myocardial Infarction or myocardial injury due to an underlying chronic condition.        TSH RFX ON ABNORMAL TO FREE T4 - Normal   MAGNESIUM - Normal   HIGH SENSITIVITIY TROPONIN T 1HR - Normal    Narrative:     High Sensitive Troponin T Reference Range:  <14.0 ng/L- Negative Female for AMI  <22.0 ng/L- Negative Male for AMI  >=14 - Abnormal Female indicating possible myocardial injury.  >=22 - Abnormal Male indicating possible myocardial injury.   Clinicians would have to utilize clinical acumen, EKG, Troponin, and serial changes to determine if it is an Acute Myocardial Infarction or myocardial injury due to an underlying chronic condition.        URINALYSIS W/ CULTURE IF INDICATED   CBC AND DIFFERENTIAL    Narrative:     The following orders were created for panel order CBC & Differential.  Procedure                               Abnormality         Status                     ---------                               -----------         ------                     CBC Auto Differential[[id number]]        Abnormal            Final result                 Please view results for these tests on the individual orders.     Medications   sodium chloride 0.9 % flush 10 mL (has no administration in time range)   morphine injection 4 mg (4 mg Intravenous Given 4/17/25 1014)   ondansetron (ZOFRAN) injection 4 mg (4 mg Intravenous Given 4/17/25 1014)   ondansetron (ZOFRAN) injection 4 mg (4 mg Intravenous Given 4/17/25 1155)   HYDROmorphone (DILAUDID) injection 0.5 mg (0.5 mg  "Intravenous Given 4/17/25 1155)   potassium chloride (KLOR-CON M20) CR tablet 40 mEq (40 mEq Oral Given 4/17/25 1222)     CT Head Without Contrast  Result Date: 4/17/2025  Impression: Head: No acute intracranial abnormality. Cervical spine: 1. No cervical spine fracture within limited field-of-view of the exam. Cervical spine only visualized to C5-6 level due to habitus and difficulty with positioning. If there is further concern for lower cervical spine fracture, cervical radiographs may be helpful. 2. Cervical spondylosis above. Electronically Signed: Timothy Soriano MD  4/17/2025 10:51 AM EDT  Workstation ID: [id number]    CT Cervical Spine Without Contrast  Result Date: 4/17/2025  Impression: Head: No acute intracranial abnormality. Cervical spine: 1. No cervical spine fracture within limited field-of-view of the exam. Cervical spine only visualized to C5-6 level due to habitus and difficulty with positioning. If there is further concern for lower cervical spine fracture, cervical radiographs may be helpful. 2. Cervical spondylosis above. Electronically Signed: Timothy Soriano MD  4/17/2025 10:51 AM EDT  Workstation ID: [id number]    XR Shoulder 2+ View Left  Result Date: 4/17/2025  Impression: Questionable nondisplaced transversely oriented fracture of the left humeral neck. Nondisplaced fracture of the left humeral greater tuberosity. No dislocation. Moderate degenerative change of the left AC joint. Electronically Signed: Kianna Cabrera MD  4/17/2025 10:31 AM EDT  Workstation ID: [id number]    XR Chest 1 View  Result Date: 4/17/2025  Impression: No active disease. Electronically Signed: Figueroa Cross MD  4/17/2025 10:30 AM EDT  Workstation ID: [id number]         Medical Decision Making  /87 (BP Location: Right arm, Patient Position: Lying)   Pulse 74   Temp 97.5 °F (36.4 °C) (Oral)   Resp 20   Ht 180.3 cm (71\")   Wt (!) 209 kg (460 lb)   SpO2 91%   BMI 64.16 kg/m²      Chart review: Patient seen on 2/18 3/09 " and 3/17 for skin infections; no complaints of dizziness or falls or relevant history.    Patient is 45-year-old male who presents to the emergency department with left shoulder pain after a fall with dizziness.  See full HPI above.    Primary assessment and initial physical exam noted above.    Patient is placed into ED bed and gown for assessment.  IV access is maintained for labs and medication if indicated.  Primary differentials for patient include anterior shoulder dislocation, shoulder contusion, acute fracture, electrolyte derangement, ACS.    Comorbidities:  has a past medical history of Allergic (1979), Arthritis (01/05/2020), Asthma (01/2024), Chronic obstructive pulmonary disease (07/17/2023), COPD (chronic obstructive pulmonary disease) (04/01/2011), GERD (gastroesophageal reflux disease) (06/06/2006), Hypertension (06/06/2006), Kidney stone (08/08/2010), Low back pain (01/01/2020), Obesity (01/21/1997), Pneumonia (11/2023), Renal insufficiency (03/2021), Stroke (01/21/2015), Substance abuse, Type 2 diabetes mellitus (07/27/2019), and Urinary tract infection (03/15/2020).    Discussion with provider: Dr. Gallo Glover    Radiology interpretation: Imaging reviewed by radiologist, Dr. Glover, and myself  CT Head Without Contrast  Result Date: 4/17/2025  Impression: Head: No acute intracranial abnormality. Cervical spine: 1. No cervical spine fracture within limited field-of-view of the exam. Cervical spine only visualized to C5-6 level due to habitus and difficulty with positioning. If there is further concern for lower cervical spine fracture, cervical radiographs may be helpful. 2. Cervical spondylosis above. Electronically Signed: Timothy Soriano MD  4/17/2025 10:51 AM EDT  Workstation ID: [id number]    CT Cervical Spine Without Contrast  Result Date: 4/17/2025  Impression: Head: No acute intracranial abnormality. Cervical spine: 1. No cervical spine fracture within limited field-of-view of the exam.  Cervical spine only visualized to C5-6 level due to habitus and difficulty with positioning. If there is further concern for lower cervical spine fracture, cervical radiographs may be helpful. 2. Cervical spondylosis above. Electronically Signed: Timothy Soriano MD  4/17/2025 10:51 AM EDT  Workstation ID: [id number]    XR Shoulder 2+ View Left  Result Date: 4/17/2025  Impression: Questionable nondisplaced transversely oriented fracture of the left humeral neck. Nondisplaced fracture of the left humeral greater tuberosity. No dislocation. Moderate degenerative change of the left AC joint. Electronically Signed: Kianna Cabrera MD  4/17/2025 10:31 AM EDT  Workstation ID: [id number]    XR Chest 1 View  Result Date: 4/17/2025  Impression: No active disease. Electronically Signed: Figueroa Cross MD  4/17/2025 10:30 AM EDT  Workstation ID: [id number]    Lab interpretation: Labs remarkable for leukocytosis on CBC.  Otherwise labs generally within normal limits with no significant electrolyte derangement or kidney injury on CMP and troponin, BNP, and mag within normal limits.    EKG interpreted by Dr. Gilliland and myself as being sinus rhythm; rate 72, , QTc 421.  EKG compared to previous from 9/22/2024.  EKG at that time was sinus rhythm; rate 78, , QTc 448.    Medications given in ED:  sodium chloride 0.9 % flush 10 mL (has no administration in time range)  morphine injection 4 mg (4 mg Intravenous Given 4/17/25 1014)  ondansetron (ZOFRAN) injection 4 mg (4 mg Intravenous Given 4/17/25 1014)  ondansetron (ZOFRAN) injection 4 mg (4 mg Intravenous Given 4/17/25 1155)  HYDROmorphone (DILAUDID) injection 0.5 mg (0.5 mg Intravenous Given 4/17/25 1155)  potassium chloride (KLOR-CON M20) CR tablet 40 mEq (40 mEq Oral Given 4/17/25 1222)    Results of imaging, EKG, and labs discussed with patient.  Repeat physical exam completed at this time.  Patient is in less distress than during primary assessment due to pain control,  otherwise exam is unchanged from previous.  Patient is alert and oriented, nontoxic-appearing with GCS 15.  Patient is informed the plan of care we did place him into the hospital for additional monitoring and treatment if needed due to acute fracture and unknown etiology behind dizzy spell and LOC this morning.  Patient verbalizes understanding of and is supported with this plan of care.  Patient will be admitted to hospitalist service with Dr. Harlan Cerna.  Hospitalist team will receive full report of ED course prior to patient leaving the ED.    Appropriate PPE worn during exam.    i discussed findings with patient who voices understanding of discharge instructions, signs and symptoms requiring return to ED; discharged improved and in stable condition with follow up for re-evaluation.  This document is intended for medical expert use only. Reading of this document by patients and/or patient's family without participating medical staff guidance may result in misinterpretation and unintended morbidity.  Any interpretation of such data is the responsibility of the patient and/or family member responsible for the patient in concert with their primary or specialist providers, not to be left for sources of online searches such as [employer], [employer] or similar queries. Relying on these approaches to knowledge may result in misinterpretation, misguided goals of care and even death should patients or family members try recommendations outside of the realm of professional medical care in a supervised inpatient environment.         Problems Addressed:  Acute pain of left shoulder: complicated acute illness or injury  Closed nondisplaced fracture of greater tuberosity of left humerus, initial encounter: complicated acute illness or injury  Dizziness: complicated acute illness or injury  Fall, initial encounter: complicated acute illness or injury    Amount and/or Complexity of Data Reviewed  Labs: ordered.  Radiology:  ordered.    Risk  Prescription drug management.  Decision regarding hospitalization.      Final diagnoses:   Dizziness   Fall, initial encounter   Acute pain of left shoulder   Closed nondisplaced fracture of greater tuberosity of left humerus, initial encounter       ED Disposition  ED Disposition       ED Disposition   Decision to Admit    Condition   --    Comment   Level of Care: Med/Surg [1]   Diagnosis: Humeral surgical neck fracture [[id number]]                 No follow-up provider specified.       Medication List      No changes were made to your prescriptions during this visit.            Mango Watson, PAAngelicaC  04/17/25 1429

## 2025-04-17 NOTE — ED NOTES
Pt arrived to ED today via EMS with c/o dizziness, generalized weakness, and left should pain after having a fall at home this morning. States he was sitting in his recliner sitting down his breakfast on the table when he had LOC for a unknown amount of time and fell on the floor. Pt son arrived with pt and is currently at bedside.

## 2025-04-17 NOTE — PLAN OF CARE
Problem: Adult Inpatient Plan of Care  Goal: Plan of Care Review  Outcome: Progressing  Flowsheets (Taken 4/17/2025 1818)  Outcome Evaluation: patient stable on room air, pain medicine prn for left shoulder. Dr. Fregoso states he will see patient in the morning, but has no plans for surgery at this time. wound nurse to see patient in the morning for right lower leg wound. He no complaints or concerns at this time. Spouse at bedside. Plan of care to continue.  Plan of Care Reviewed With:   patient   spouse  Goal: Patient-Specific Goal (Individualized)  Outcome: Progressing  Goal: Absence of Hospital-Acquired Illness or Injury  Outcome: Progressing  Intervention: Identify and Manage Fall Risk  Description: Perform standard risk assessment on admission using a validated tool or comprehensive approach appropriate to the patient; reassess fall risk frequently, with change in status or transfer to another level of care.Communicate risk to interprofessional healthcare team; ensure fall risk visible cue.Determine need for increased observation, equipment and environmental modification, as well as use of supportive, nonskid footwear.Adjust safety measures to individual needs and identified risk factors.Reinforce the importance of active participation with fall risk prevention, safety, and physical activity with the patient and family.Perform regular intentional rounding to assess need for position change, pain assessment and personal needs, including assistance with toileting.  Recent Flowsheet Documentation  Taken 4/17/2025 1800 by Carmen Garcia, RN  Safety Promotion/Fall Prevention: safety round/check completed  Taken 4/17/2025 1501 by Carmen Garcia, RN  Safety Promotion/Fall Prevention:   safety round/check completed   patient off unit   activity supervised   assistive device/personal items within reach   nonskid shoes/slippers when out of bed  Intervention: Prevent Skin Injury  Description: Perform a screening for skin  injury risk, such as pressure or moisture-associated skin damage on admission and at regular intervals throughout hospital stay.Keep all areas of skin (especially folds) clean and dry.Maintain adequate skin hydration.Relieve and redistribute pressure and protect bony prominences and skin at risk for injury; implement measures based on patient-specific risk factors.Match turning and repositioning schedule to clinical condition.Encourage weight shift frequently; assist with reposition if unable to complete independently.Float heels off bed; avoid pressure on the Achilles tendon.Keep skin free from extended contact with medical devices.Optimize nutrition and hydration.Encourage functional activity and mobility, as early as tolerated.Use aids (e.g., slide boards, mechanical lift) during transfer.  Recent Flowsheet Documentation  Taken 4/17/2025 1501 by Carmen Garcia RN  Body Position: position changed independently  Skin Protection: transparent dressing maintained  Intervention: Prevent and Manage VTE (Venous Thromboembolism) Risk  Description: Assess for VTE (venous thromboembolism) risk.Promote early mobilization; encourage both active and passive leg exercises, if unable to ambulate.Initiate and maintain compression or other therapy, as indicated, based on identified risk in accordance with organizational protocol and provider order.Recognize the patient's individual risk for bleeding before initiating pharmacologic thromboprophylaxis.  Recent Flowsheet Documentation  Taken 4/17/2025 1501 by Carmen Garcia RN  VTE Prevention/Management: (eliquis)   bilateral   SCDs (sequential compression devices) off   patient refused intervention  Goal: Optimal Comfort and Wellbeing  Outcome: Progressing  Intervention: Provide Person-Centered Care  Description: Use a family-focused approach to care; encourage support system presence and participation.Develop trust and rapport by proactively providing information, encouraging  questions, addressing concerns and offering reassurance.Acknowledge emotional response to hospitalization.Recognize and utilize personal coping strategies and strengths; develop goals via shared decision-making.Honor spiritual and cultural preferences.  Recent Flowsheet Documentation  Taken 4/17/2025 1501 by Carmen Garcia RN  Trust Relationship/Rapport:   care explained   choices provided   reassurance provided   thoughts/feelings acknowledged  Goal: Readiness for Transition of Care  Outcome: Progressing   Goal Outcome Evaluation:  Plan of Care Reviewed With: patient, spouse           Outcome Evaluation: patient stable on room air, pain medicine prn for left shoulder. Dr. Fregoso states he will see patient in the morning, but has no plans for surgery at this time. wound nurse to see patient in the morning for right lower leg wound. He no complaints or concerns at this time. Spouse at bedside. Plan of care to continue.

## 2025-04-17 NOTE — CASE MANAGEMENT/SOCIAL WORK
Discharge Planning Assessment   [place]     Patient Name: Farhat Hein  MRN: [id number]  Today's Date: 4/17/2025    Admit Date: 4/17/2025    Plan: From Home with Family; Requested PT/OT [facility]   Discharge Needs Assessment       Row Name 04/17/25 1321       Living Environment    People in Home child(americo), dependent;spouse    Name(s) of People in Home Spouse-Quynh; Son-Farhat    Current Living Arrangements home    Potentially Unsafe Housing Conditions none    In the past 12 months has the electric, gas, oil, or water company threatened to shut off services in your home? No    Primary Care Provided by self    Provides Primary Care For child(americo)    Family Caregiver if Needed spouse    Family Caregiver Names Quynh    Quality of Family Relationships unable to assess    Able to Return to Prior Arrangements yes    Living Arrangement Comments Home with  Family       Resource/Environmental Concerns    Resource/Environmental Concerns none    Transportation Concerns none       Transportation Needs    In the past 12 months, has lack of transportation kept you from medical appointments or from getting medications? no    In the past 12 months, has lack of transportation kept you from meetings, work, or from getting things needed for daily living? No       Food Insecurity    Within the past 12 months, you worried that your food would run out before you got the money to buy more. Never true    Within the past 12 months, the food you bought just didn't last and you didn't have money to get more. Never true       Transition Planning    Patient/Family Anticipates Transition to home with family    Patient/Family Anticipated Services at Transition none    Transportation Anticipated family or friend will provide       Discharge Needs Assessment    Readmission Within the Last 30 Days no previous admission in last 30 days    Equipment Currently Used at Home walker, rolling;glucometer;cane, straight    Concerns to be Addressed discharge  planning    Do you want help finding or keeping work or a job? I do not need or want help    Do you want help with school or training? For example, starting or completing job training or getting a high school diploma, GED or equivalent No    Anticipated Changes Related to Illness none    Equipment Needed After Discharge none    Discharge Facility/Level of Care Needs outpatient therapy    Provided Post Acute Provider List? N/A    Provided Post Acute Provider Quality & Resource List? N/A    Offered/Gave Vendor List no                   Discharge Plan       Row Name 04/17/25 1322       Plan    Plan From Home with Family; Requested PT/OT [facility]    Patient/Family in Agreement with Plan unable to assess    Plan Comments Met with pt at bedside, confirmed PCP and pharm. States he is independent with ADLs and IADLs, denies financial concerns and family can provide dc transportation. Discussed dc plan, declines HH or SNF needs and anticipates home with family and poss OP therapy. SC sent to Dr. Cerna to request PT/OT mona for dc planning Final dc plan is pending clinical course.                                            DC Barriers: Pain control; monitor labs; Tele              Demographic Summary       Row Name 04/17/25 1320       General Information    Admission Type observation    Arrived From home    Required Notices Provided Observation Status Notice    Referral Source emergency department    Reason for Consult discharge planning    Preferred Language English       Contact Information    Permission Granted to Share Info With                    Functional Status       Row Name 04/17/25 1320       Functional Status    Usual Activity Tolerance good    Current Activity Tolerance moderate       Physical Activity    On average, how many days per week do you engage in moderate to strenuous exercise (like a brisk walk)? 7 days    On average, how many minutes do you engage in exercise at this level? 120 min     Number of minutes of exercise per week 840       Assessment of Health Literacy    How often do you have someone help you read hospital materials? Occasionally    How often do you have problems learning about your medical condition because of difficulty understanding written information? Occasionally    How often do you have a problem understanding what is told to you about your medical condition? Occasionally    How confident are you filling out medical forms by yourself? Extremely    Health Literacy Good       Functional Status, IADL    Medications independent    Meal Preparation independent    Housekeeping independent    Laundry independent    Shopping independent    If for any reason you need help with day-to-day activities such as bathing, preparing meals, shopping, managing finances, etc., do you get the help you need? I don't need any help       Mental Status    General Appearance WDL WDL       Mental Status Summary    Recent Changes in Mental Status/Cognitive Functioning no changes       Employment/    Employment Status disabled              Patient Forms       Row Name 04/17/25 1320       Patient Forms    Important Message from Medicare (IMM) Delivered  COMBS Per Reg    Patient Observation Letter Delivered    Delivered to Patient    Method of delivery In person             Met with patient in room wearing PPE: mask    Maintained distance greater than six feet and spent less than 15 minutes in the room    Marquita Mccann RN    Phone [phone]  Fax [phone]

## 2025-04-17 NOTE — ED NOTES
Provider notified pt is still unable to provide urine specimen. Provider denies need for in and out catheterization for UA at this time. Will continue to attempt to collect clean catch.

## 2025-04-18 ENCOUNTER — READMISSION MANAGEMENT (OUTPATIENT)
Dept: CALL CENTER | Facility: HOSPITAL | Age: 45
End: 2025-04-18
Payer: MEDICARE

## 2025-04-18 VITALS
HEIGHT: 71 IN | WEIGHT: 315 LBS | HEART RATE: 89 BPM | BODY MASS INDEX: 44.1 KG/M2 | TEMPERATURE: 97.4 F | SYSTOLIC BLOOD PRESSURE: 127 MMHG | OXYGEN SATURATION: 97 % | RESPIRATION RATE: 16 BRPM | DIASTOLIC BLOOD PRESSURE: 99 MMHG

## 2025-04-18 LAB
ANION GAP SERPL CALCULATED.3IONS-SCNC: 8.3 MMOL/L (ref 5–15)
AORTIC DIMENSIONLESS INDEX: 0.83 (DI)
AV MEAN PRESS GRAD SYS DOP V1V2: 3 MMHG
AV VMAX SYS DOP: 116 CM/SEC
BASOPHILS # BLD AUTO: 0.03 10*3/MM3 (ref 0–0.2)
BASOPHILS NFR BLD AUTO: 0.3 % (ref 0–1.5)
BH CV ECHO MEAS - AO MAX PG: 5.4 MMHG
BH CV ECHO MEAS - AO V2 VTI: 20.1 CM
BH CV ECHO MEAS - AVA(I,D): 3.5 CM2
BH CV ECHO MEAS - EDV(CUBED): 110.6 ML
BH CV ECHO MEAS - EDV(MOD-SP4): 141 ML
BH CV ECHO MEAS - EF(MOD-SP4): 63.9 %
BH CV ECHO MEAS - ESV(CUBED): 35.9 ML
BH CV ECHO MEAS - ESV(MOD-SP4): 50.9 ML
BH CV ECHO MEAS - FS: 31.3 %
BH CV ECHO MEAS - IVS/LVPW: 1 CM
BH CV ECHO MEAS - IVSD: 1.3 CM
BH CV ECHO MEAS - LA DIMENSION: 3.6 CM
BH CV ECHO MEAS - LV DIASTOLIC VOL/BSA (35-75): 46 CM2
BH CV ECHO MEAS - LV MASS(C)D: 245.7 GRAMS
BH CV ECHO MEAS - LV MAX PG: 3.6 MMHG
BH CV ECHO MEAS - LV MEAN PG: 2 MMHG
BH CV ECHO MEAS - LV SYSTOLIC VOL/BSA (12-30): 16.6 CM2
BH CV ECHO MEAS - LV V1 MAX: 94.6 CM/SEC
BH CV ECHO MEAS - LV V1 VTI: 16.7 CM
BH CV ECHO MEAS - LVIDD: 4.8 CM
BH CV ECHO MEAS - LVIDS: 3.3 CM
BH CV ECHO MEAS - LVOT AREA: 4.2 CM2
BH CV ECHO MEAS - LVOT DIAM: 2.3 CM
BH CV ECHO MEAS - LVPWD: 1.3 CM
BH CV ECHO MEAS - MV A DUR: 0.11 SEC
BH CV ECHO MEAS - MV A MAX VEL: 66.9 CM/SEC
BH CV ECHO MEAS - MV DEC SLOPE: 440 CM/SEC2
BH CV ECHO MEAS - MV DEC TIME: 0.2 SEC
BH CV ECHO MEAS - MV E MAX VEL: 57.7 CM/SEC
BH CV ECHO MEAS - MV E/A: 0.86
BH CV ECHO MEAS - MV MAX PG: 2.25 MMHG
BH CV ECHO MEAS - MV MEAN PG: 1 MMHG
BH CV ECHO MEAS - MV P1/2T: 49.4 MSEC
BH CV ECHO MEAS - MV V2 VTI: 20.4 CM
BH CV ECHO MEAS - MVA(P1/2T): 4.5 CM2
BH CV ECHO MEAS - MVA(VTI): 3.4 CM2
BH CV ECHO MEAS - PA ACC TIME: 0.16 SEC
BH CV ECHO MEAS - PA V2 MAX: 98.4 CM/SEC
BH CV ECHO MEAS - RV MAX PG: 2.16 MMHG
BH CV ECHO MEAS - RV V1 MAX: 73.4 CM/SEC
BH CV ECHO MEAS - RV V1 VTI: 16 CM
BH CV ECHO MEAS - RVDD: 2.8 CM
BH CV ECHO MEAS - SV(LVOT): 69.4 ML
BH CV ECHO MEAS - SV(MOD-SP4): 90.1 ML
BH CV ECHO MEAS - SVI(LVOT): 22.6 ML/M2
BH CV ECHO MEAS - SVI(MOD-SP4): 29.4 ML/M2
BILIRUB UR QL STRIP: NEGATIVE
BUN SERPL-MCNC: 19 MG/DL (ref 6–20)
BUN/CREAT SERPL: 18.4 (ref 7–25)
CALCIUM SPEC-SCNC: 8.6 MG/DL (ref 8.6–10.5)
CHLORIDE SERPL-SCNC: 100 MMOL/L (ref 98–107)
CLARITY UR: CLEAR
CO2 SERPL-SCNC: 29.7 MMOL/L (ref 22–29)
COLOR UR: YELLOW
CREAT SERPL-MCNC: 1.03 MG/DL (ref 0.76–1.27)
DEPRECATED RDW RBC AUTO: 49.1 FL (ref 37–54)
EGFRCR SERPLBLD CKD-EPI 2021: 90.7 ML/MIN/1.73
EOSINOPHIL # BLD AUTO: 0.23 10*3/MM3 (ref 0–0.4)
EOSINOPHIL NFR BLD AUTO: 2.5 % (ref 0.3–6.2)
ERYTHROCYTE [DISTWIDTH] IN BLOOD BY AUTOMATED COUNT: 14.5 % (ref 12.3–15.4)
GLUCOSE SERPL-MCNC: 122 MG/DL (ref 65–99)
GLUCOSE UR STRIP-MCNC: NEGATIVE MG/DL
HCT VFR BLD AUTO: 41.4 % (ref 37.5–51)
HGB BLD-MCNC: 12.6 G/DL (ref 13–17.7)
HGB UR QL STRIP.AUTO: NEGATIVE
IMM GRANULOCYTES # BLD AUTO: 0.03 10*3/MM3 (ref 0–0.05)
IMM GRANULOCYTES NFR BLD AUTO: 0.3 % (ref 0–0.5)
KETONES UR QL STRIP: NEGATIVE
LEUKOCYTE ESTERASE UR QL STRIP.AUTO: NEGATIVE
LYMPHOCYTES # BLD AUTO: 2.5 10*3/MM3 (ref 0.7–3.1)
LYMPHOCYTES NFR BLD AUTO: 27.1 % (ref 19.6–45.3)
MCH RBC QN AUTO: 28.1 PG (ref 26.6–33)
MCHC RBC AUTO-ENTMCNC: 30.4 G/DL (ref 31.5–35.7)
MCV RBC AUTO: 92.2 FL (ref 79–97)
MONOCYTES # BLD AUTO: 0.84 10*3/MM3 (ref 0.1–0.9)
MONOCYTES NFR BLD AUTO: 9.1 % (ref 5–12)
NEUTROPHILS NFR BLD AUTO: 5.6 10*3/MM3 (ref 1.7–7)
NEUTROPHILS NFR BLD AUTO: 60.7 % (ref 42.7–76)
NITRITE UR QL STRIP: NEGATIVE
NRBC BLD AUTO-RTO: 0 /100 WBC (ref 0–0.2)
PH UR STRIP.AUTO: <=5 [PH] (ref 5–8)
PLATELET # BLD AUTO: 188 10*3/MM3 (ref 140–450)
PMV BLD AUTO: 10.5 FL (ref 6–12)
POTASSIUM SERPL-SCNC: 3.7 MMOL/L (ref 3.5–5.2)
PROT UR QL STRIP: NEGATIVE
QT INTERVAL: 384 MS
QTC INTERVAL: 421 MS
RBC # BLD AUTO: 4.49 10*6/MM3 (ref 4.14–5.8)
SINUS: 4 CM
SODIUM SERPL-SCNC: 138 MMOL/L (ref 136–145)
SP GR UR STRIP: 1.01 (ref 1–1.03)
STJ: 3.9 CM
UROBILINOGEN UR QL STRIP: NORMAL
WBC NRBC COR # BLD AUTO: 9.23 10*3/MM3 (ref 3.4–10.8)

## 2025-04-18 PROCEDURE — G0378 HOSPITAL OBSERVATION PER HR: HCPCS

## 2025-04-18 PROCEDURE — 85025 COMPLETE CBC W/AUTO DIFF WBC: CPT | Performed by: FAMILY MEDICINE

## 2025-04-18 PROCEDURE — 96376 TX/PRO/DX INJ SAME DRUG ADON: CPT

## 2025-04-18 PROCEDURE — 80048 BASIC METABOLIC PNL TOTAL CA: CPT | Performed by: FAMILY MEDICINE

## 2025-04-18 PROCEDURE — 94799 UNLISTED PULMONARY SVC/PX: CPT

## 2025-04-18 PROCEDURE — 97161 PT EVAL LOW COMPLEX 20 MIN: CPT

## 2025-04-18 PROCEDURE — 81003 URINALYSIS AUTO W/O SCOPE: CPT | Performed by: FAMILY MEDICINE

## 2025-04-18 PROCEDURE — 25010000002 MORPHINE PER 10 MG: Performed by: FAMILY MEDICINE

## 2025-04-18 RX ORDER — OXYCODONE HYDROCHLORIDE 5 MG/1
5 TABLET ORAL EVERY 4 HOURS PRN
Qty: 18 TABLET | Refills: 0 | Status: SHIPPED | OUTPATIENT
Start: 2025-04-18 | End: 2025-04-21

## 2025-04-18 RX ADMIN — OXYCODONE 5 MG: 5 TABLET ORAL at 05:13

## 2025-04-18 RX ADMIN — CARVEDILOL 25 MG: 6.25 TABLET, FILM COATED ORAL at 08:18

## 2025-04-18 RX ADMIN — HYDRALAZINE HYDROCHLORIDE 25 MG: 25 TABLET ORAL at 08:18

## 2025-04-18 RX ADMIN — TORSEMIDE 100 MG: 100 TABLET ORAL at 08:18

## 2025-04-18 RX ADMIN — OXYCODONE 5 MG: 5 TABLET ORAL at 14:49

## 2025-04-18 RX ADMIN — PANTOPRAZOLE SODIUM 40 MG: 40 TABLET, DELAYED RELEASE ORAL at 08:17

## 2025-04-18 RX ADMIN — OXYCODONE 5 MG: 5 TABLET ORAL at 10:08

## 2025-04-18 RX ADMIN — MORPHINE SULFATE 3 MG: 4 INJECTION, SOLUTION INTRAMUSCULAR; INTRAVENOUS at 04:13

## 2025-04-18 RX ADMIN — APIXABAN 5 MG: 5 TABLET, FILM COATED ORAL at 08:17

## 2025-04-18 RX ADMIN — BUDESONIDE AND FORMOTEROL FUMARATE DIHYDRATE 2 PUFF: 160; 4.5 AEROSOL RESPIRATORY (INHALATION) at 11:05

## 2025-04-18 RX ADMIN — Medication 10 ML: at 08:18

## 2025-04-18 RX ADMIN — MORPHINE SULFATE 3 MG: 4 INJECTION, SOLUTION INTRAMUSCULAR; INTRAVENOUS at 08:17

## 2025-04-18 RX ADMIN — POTASSIUM CHLORIDE 20 MEQ: 1500 TABLET, EXTENDED RELEASE ORAL at 08:17

## 2025-04-18 RX ADMIN — OXYCODONE 5 MG: 5 TABLET ORAL at 02:23

## 2025-04-18 RX ADMIN — SERTRALINE HYDROCHLORIDE 25 MG: 50 TABLET, FILM COATED ORAL at 08:17

## 2025-04-18 NOTE — CONSULTS
Orthopaedic Consultation      Patient: Farhat Hein    Date of Admission: 4/17/2025  9:01 AM    YOB: 1980    Medical Record Number: [id number]    Attending Physician:  Amanda Méndez MD    Consulting Physician:  Salina Caballero PA-C        Chief Complaints: Left shoulder pain    History of Present Illness: 45 y.o. male admitted to [facility] with left shoulder pain. He presented to [facility] Emergency department with complaints of left shoulder pain secondary to a fall at home. The patient states he thinks he passed out and woke up on the floor to his dogs licking his face. He noticed increased pain in the left shoulder and dizziness. He denies any head injury. He was admitted to the hospital for medical management of his dizziness. Shoulder xrays were performed which revealed a nondisplaced fracture of the left humeral neck and greater tuberosity. I was consulted for further evaluation and treatment.     Allergies:   Allergies   Allergen Reactions    Promethazine Itching    Vancomycin Anaphylaxis    Calamine-Zinc Oxide Itching and Rash       Medications:   Home Medications:  Medications Prior to Admission   Medication Sig Dispense Refill Last Dose/Taking    Eliquis 5 MG tablet tablet TAKE 1 TABLET BY MOUTH 2 TIMES A  tablet 0 4/16/2025    vitamin D3 125 MCG (5000 UT) capsule capsule Take 1 capsule by mouth Daily. 30 capsule 1 Taking    albuterol sulfate  (90 Base) MCG/ACT inhaler Inhale 2 puffs Every 4 (Four) Hours As Needed for Wheezing. 18 g 0     budesonide-formoterol (Symbicort) 80-4.5 MCG/ACT inhaler Inhale 2 puffs 2 (Two) Times a Day. 10.2 g 11     carvedilol (COREG) 25 MG tablet Take 1 tablet by mouth 2 (Two) Times a Day With Meals. 180 tablet 3     hydrALAZINE (APRESOLINE) 25 MG tablet TAKE 1 TABLET BY MOUTH 3 TIMES A DAY 90 tablet 1     hydroCHLOROthiazide 25 MG tablet TAKE 1 TABLET BY MOUTH DAILY 60 tablet 0     multivitamin (THERAGRAN) tablet tablet Take 1  tablet by mouth Daily. mens       pantoprazole (PROTONIX) 40 MG EC tablet Take 1 tablet by mouth 2 (Two) Times a Day.       potassium chloride (MICRO-K) 10 MEQ CR capsule TAKE 1 CAPSULE BY MOUTH DAILY 90 capsule 3     rOPINIRole (REQUIP) 0.25 MG tablet Take 1 tablet by mouth Every Night. Take 1 hour before bedtime.       sertraline (Zoloft) 25 MG tablet Take 1 tablet by mouth Daily. 90 tablet 3     torsemide (DEMADEX) 100 MG tablet TAKE 1 TABLET BY MOUTH DAILY 90 tablet 0        Current Medications:  Scheduled Meds:apixaban, 5 mg, Oral, BID  budesonide-formoterol, 2 puff, Inhalation, BID - RT  carvedilol, 25 mg, Oral, BID With Meals  hydrALAZINE, 25 mg, Oral, TID  pantoprazole, 40 mg, Oral, BID  potassium chloride, 20 mEq, Oral, Daily  sertraline, 25 mg, Oral, Daily  sodium chloride, 10 mL, Intravenous, Q12H  torsemide, 100 mg, Oral, Daily      Continuous Infusions:   PRN Meds:.  acetaminophen **OR** acetaminophen **OR** acetaminophen    senna-docusate sodium **AND** polyethylene glycol **AND** bisacodyl **AND** bisacodyl    Calcium Replacement - Follow Nurse / BPA Driven Protocol    Magnesium Standard Dose Replacement - Follow Nurse / BPA Driven Protocol    Morphine **AND** naloxone    ondansetron ODT **OR** ondansetron    oxyCODONE    Phosphorus Replacement - Follow Nurse / BPA Driven Protocol    Potassium Replacement - Follow Nurse / BPA Driven Protocol    [COMPLETED] Insert Peripheral IV **AND** sodium chloride    sodium chloride    sodium chloride    Past Medical History:   Diagnosis Date    Allergic 1980    seasonal    Arthritis 01/05/2020    legs    Asthma 01/2024    Chronic obstructive pulmonary disease 07/17/2023    COPD (chronic obstructive pulmonary disease) 04/01/2011    GERD (gastroesophageal reflux disease) 06/06/2006    Hypertension 06/06/2006    Kidney stone 08/08/2010    Low back pain 01/01/2020    Obesity 01/21/1997    Pneumonia 11/2023    Renal insufficiency 03/2021    Stroke 01/21/2015     2016    Substance abuse     Type 2 diabetes mellitus 2019    Urinary tract infection 03/15/2020     Past Surgical History:   Procedure Laterality Date    APPENDECTOMY  2017    CHOLECYSTECTOMY  2016     Social History     Occupational History    Not on file   Tobacco Use    Smoking status: Former     Current packs/day: 0.00     Average packs/day: 2.0 packs/day for 10.4 years (20.8 ttl pk-yrs)     Types: Cigarettes     Start date: 1998     Quit date: 2008     Years since quittin.8     Passive exposure: Past    Smokeless tobacco: Never   Vaping Use    Vaping status: Never Used   Substance and Sexual Activity    Alcohol use: Not Currently     Alcohol/week: 12.0 standard drinks of alcohol     Types: 12 Cans of beer per week     Comment: PER DAY    Drug use: Yes     Frequency: 7.0 times per week     Types: Marijuana    Sexual activity: Yes     Partners: Female     Birth control/protection: Condom      Social History     Social History Narrative    Not on file     Family History   Problem Relation Age of Onset    Arthritis Mother     COPD Mother     Diabetes Mother     Hyperlipidemia Mother     Hypertension Mother     Alcohol abuse Father     Arthritis Father     Cancer Father         SKIN    Diabetes Father     Heart disease Father     Hyperlipidemia Father     Hypertension Father     Obesity Father     Arthritis Sister     Cancer Sister         BREAST    Diabetes Sister     Hyperlipidemia Sister     Heart disease Sister     Hypertension Sister     Obesity Sister     Sleep apnea Sister     Arthritis Brother     Cancer Brother         KIDNEY/BRAIN/SKIN    Hyperlipidemia Brother     Kidney disease Brother     Hypertension Brother     Alcohol abuse Brother     Mental illness Brother         schizophrenia    Cancer Sister         COLON    Diabetes Sister             Hyperlipidemia Sister     Hypertension Sister     Diabetes Brother     Heart disease Brother     Hyperlipidemia Brother   "   Hypertension Brother     Alcohol abuse Brother     Cancer Maternal Grandmother     Cancer Maternal Uncle          Review of Systems:   No other pertinent positives or negatives other than what is mentioned in the HPI and below.  Constitutional: Negative for fatigue, fever, or weight loss  HEENT: No active headache.  Pulmonary: Patient denies SOA.  Cardiovascular: Patient denies any chest pain.  Gastrointestinal:  Patient denies active vomiting or diarrhea.  Musculoskeletal: Positive for left shoulder pain.  Neurological: Patient denies active dizziness or loss of consciousness.  Skin: Patient denies any active bleeding.    Vital signs in last 24 hours:  Temp:  [97.5 °F (36.4 °C)-98.4 °F (36.9 °C)] 97.9 °F (36.6 °C)  Heart Rate:  [72-97] 79  Resp:  [10-20] 15  BP: ()/() 133/88  Vitals:    04/17/25 2020 04/17/25 2037 04/17/25 2359 04/18/25 0431   BP: 134/87 125/79 126/78 133/88   BP Location:  Right arm Right arm Right arm   Patient Position:  Lying Sitting Sitting   Pulse:  80 87 79   Resp:  11 17 15   Temp:  97.7 °F (36.5 °C) 97.7 °F (36.5 °C) 97.9 °F (36.6 °C)   TempSrc:  Oral Oral Oral   SpO2:  99% 94% 95%   Weight:  (!) 219 kg (482 lb 5.9 oz)  (!) 219 kg (482 lb 5.9 oz)   Height:  180.3 cm (70.98\")            Physical Exam: 45 y.o. male         General Appearance:  Alert, cooperative, in no acute distress    HEENT:    Atraumatic, Pupils are equal   Neck:   Cervical spine midline, no appreciable JVD   Lungs:     Breathing non-labored and chest rise symmetric    Heart:   Abdomen:     Rectal:       Extremities:   Pulses  Neurovascular:   Skin:   Musculoskeletal:      Pulse regular    Soft, Non-tender or distended    Deferred        No clubbing, cyanosis, or edema    Intact    Cranial nerves 2 - 12 grossly intact, sensation intact    No skin lesions    Examination of the left shoulder reveals intact skin with no erythema, edema, bruising, or signs of infection. Sling in proper placement on the left " upper extremity. Mild tenderness to palpation along the anterior and posterior glenohumeral joint. Shoulder range of motion not assessed. Normal elbow and wrist range of motion.  strength normal. Neurovascularly intact distally.     Diagnostic Tests:    Results from last 7 days   Lab Units 04/18/25  0512   WBC 10*3/mm3 9.23   HEMOGLOBIN g/dL 12.6*   HEMATOCRIT % 41.4   PLATELETS 10*3/mm3 188     Results from last 7 days   Lab Units 04/18/25  0512   SODIUM mmol/L 138   POTASSIUM mmol/L 3.7   CHLORIDE mmol/L 100   CO2 mmol/L 29.7*   BUN mg/dL 19   CREATININE mg/dL 1.03   GLUCOSE mg/dL 122*   CALCIUM mg/dL 8.6           XR Shoulder 2+ View Left  Result Date: 4/17/2025  XR SHOULDER 2+ VW LEFT Date of Exam: 4/17/2025 10:10 AM EDT Indication: fall Comparison: None available. Findings: Nondisplaced vertically oriented fracture of the greater tuberosity of the left humerus. Questionable nondisplaced transversely logic fracture of the left humeral neck. There is no shoulder dislocation. There is moderate degenerative change of the left acromioclavicular joint. Imaged left ribs appear intact.     Impression: Questionable nondisplaced transversely oriented fracture of the left humeral neck. Nondisplaced fracture of the left humeral greater tuberosity. No dislocation. Moderate degenerative change of the left AC joint. Electronically Signed: Kianna Cabrera MD  4/17/2025 10:31 AM EDT  Workstation ID: [id number]        Assessment:    Humeral surgical neck fracture        Plan:    Xrays of the left shoulder reveals a nondisplaced fracture of the left humeral neck and greater tuberosity.  Treatment options were discussed thoroughly with the patient.  Will continue with nonoperative management at this time.  Continue in the sling and avoid any significant range of motion of the left shoulder.  NWB left upper extremity.  Can remove the sling multiple times daily to move the elbow and wrist.  Continue pain medication as needed and as  directed.  Recommend ice and anti-inflammatories for pain and swelling.  Patient advised to follow up in the outpatient office with Dr. Fregoso in 1 week for reevaluation and new xrays.   Patient is agreeable to the plan and all questions were answered.  Orthopedics will sign off at this time.      The patient voiced understanding of the risks, benefits, and alternative forms of treatment that were discussed and the patient consents to proceed with the above treatment plam.     Date: 4/18/2025  Salina Caballero PA-C

## 2025-04-18 NOTE — CASE MANAGEMENT/SOCIAL WORK
Case Management Discharge Note      Final Note: routine home    Provided Post Acute Provider List?: N/A  Provided Post Acute Provider Quality & Resource List?: N/A    Selected Continued Care - Discharged on 4/18/2025 Admission date: 4/17/2025 - Discharge disposition: Home or Self Care       Transportation Services  Private: Car    Final Discharge Disposition Code: 01 - home or self-care

## 2025-04-18 NOTE — THERAPY EVALUATION
Patient Name: Farhat Hein  : 1980    MRN: [id number]                              Today's Date: 2025       Admit Date: 2025    Visit Dx:     ICD-10-CM ICD-9-CM   1. Dizziness  R42 780.4   2. Fall, initial encounter  W19.XXXA E888.9   3. Acute pain of left shoulder  M25.512 719.41   4. Closed nondisplaced fracture of greater tuberosity of left humerus, initial encounter  S42.255A 812.03   5. Closed nondisplaced fracture of surgical neck of humerus, unspecified fracture morphology, unspecified laterality, sequela  S42.216S 905.2     Patient Active Problem List   Diagnosis    Chronic cholecystitis    Chronic obstructive pulmonary disease    Compression fracture of lumbar vertebra    Diabetic peripheral neuropathy    Factor V Leiden mutation    Hyperlipidemia    Hypertension    Peripheral vascular disease    Restless legs syndrome    Type 2 diabetes mellitus    Anemia    Anxiety and depression    CKD (chronic kidney disease) stage 2, GFR 60-89 ml/min    Deep vein thrombosis (DVT)    Morbid obesity with BMI of 60.0-69.9, adult    GERD (gastroesophageal reflux disease)    Suspected sleep apnea    Chronic back pain    Wound of right leg    History of CVA (cerebrovascular accident)    Chronic venous insufficiency of lower extremity    Ankle edema    Pain due to varicose veins of both lower extremities    Varicose veins of lower extremity with ulcer and inflammation    Lower extremity cellulitis    Cellulitis    Humeral surgical neck fracture     Past Medical History:   Diagnosis Date    Allergic 1979    seasonal    Arthritis 2020    legs    Asthma 2024    Chronic obstructive pulmonary disease 2023    COPD (chronic obstructive pulmonary disease) 2011    GERD (gastroesophageal reflux disease) 2006    Hypertension 2006    Kidney stone 2010    Low back pain 2020    Obesity 1997    Pneumonia 2023    Renal insufficiency 2021    Stroke 2015     01/21/2016    Substance abuse     Type 2 diabetes mellitus 07/27/2019    Urinary tract infection 03/15/2020     Past Surgical History:   Procedure Laterality Date    APPENDECTOMY  03/2017    CHOLECYSTECTOMY  02/2016      General Information       Row Name 04/18/25 1330          Physical Therapy Time and Intention    Document Type evaluation  -RR (r) DE (t) RR (c)     Mode of Treatment physical therapy  -RR (r) DE (t) RR (c)       Row Name 04/18/25 1330          General Information    Patient Profile Reviewed yes  -RR (r) DE (t) RR (c)     Prior Level of Function independent:;ADL's;driving;feeding;grooming;dressing;bathing;transfer;bed mobility;gait;community mobility;all household mobility  uses RW/cane PRN for ambulation; has some assitance w/ shower transfers  -RR (r) DE (t) RR (c)     Existing Precautions/Restrictions fall;shoulder  NWB LUE  -RR (r) DE (t) RR (c)     Barriers to Rehab none identified  -RR (r) DE (t) RR (c)       Row Name 04/18/25 1330          Living Environment    Current Living Arrangements home  -RR (r) DE (t) RR (c)     People in Home child(americo), dependent;spouse  -RR (r) DE (t) RR (c)       Row Name 04/18/25 1330          Home Main Entrance    Number of Stairs, Main Entrance four  -RR (r) DE (t) RR (c)     Stair Railings, Main Entrance railings safe and in good condition  -RR (r) DE (t) RR (c)       Row Name 04/18/25 1330          Stairs Within Home, Primary    Number of Stairs, Within Home, Primary none  -RR (r) DE (t) RR (c)     Stair Railings, Within Home, Primary none  -RR (r) DE (t) RR (c)       Row Name 04/18/25 1330          Cognition    Orientation Status (Cognition) oriented x 4  -RR (r) DE (t) RR (c)               User Key  (r) = Recorded By, (t) = Taken By, (c) = Cosigned By      Initials Name Provider Type    RR Licha Craig, PT Physical Therapist    David Jara, PT Student PT Student                   Mobility       Row Name 04/18/25 1333          Bed Mobility    Bed  Mobility other (see comments)  NT - pt in bedside chair upon arrival  -RR (r) DE (t) RR (c)       Row Name 04/18/25 1333          Sit-Stand Transfer    Sit-Stand [place] (Transfers) independent;supervision;other (see comments)  CGA given for safety during eval  -RR (r) DE (t) RR (c)       Row Name 04/18/25 1333          Gait/Stairs (Locomotion)    [place] Level (Gait) standby assist  CGA given for safety during eval  -RR (r) DE (t) RR (c)     Patient was able to Ambulate yes  -RR (r) DE (t) RR (c)     Distance in Feet (Gait) 100  -RR (r) DE (t) RR (c)     Deviations/Abnormal Patterns (Gait) base of support, wide  -RR (r) DE (t) RR (c)               User Key  (r) = Recorded By, (t) = Taken By, (c) = Cosigned By      Initials Name Provider Type    Licha Potts PT Physical Therapist    David Jara, PT Student PT Student                   Obj/Interventions       Row Name 04/18/25 1335          Range of Motion Comprehensive    General Range of Motion no range of motion deficits identified  -RR (r) DE (t) RR (c)       Row Name 04/18/25 1335          Strength Comprehensive (MMT)    General Manual Muscle Testing (MMT) Assessment no strength deficits identified  -RR (r) DE (t) RR (c)       Row Name 04/18/25 1335          Balance    Balance Assessment standing static balance;standing dynamic balance  -RR (r) DE (t) RR (c)     Static Standing Balance supervision  near or at BL  -RR (r) DE (t) RR (c)     Dynamic Standing Balance supervision  near or at BL  -RR (r) DE (t) RR (c)     Position/Device Used, Standing Balance unsupported  -RR (r) DE (t) RR (c)       Row Name 04/18/25 1335          Sensory Assessment (Somatosensory)    Sensory Assessment (Somatosensory) other (see comments)  BLE sensation intact to light touch  -RR (r) DE (t) RR (c)               User Key  (r) = Recorded By, (t) = Taken By, (c) = Cosigned By      Initials Name Provider Type    Licha Potts PT Physical Therapist    DE  David Nunez, PT Student PT Student                   Goals/Plan    No documentation.                  Clinical Impression       Row Name 04/18/25 4166          Pain    Pretreatment Pain Rating 7/10  -RR (r) DE (t) RR (c)     Posttreatment Pain Rating 7/10  -RR (r) DE (t) RR (c)     Pain Location shoulder  -RR (r) DE (t) RR (c)     Pain Side/Orientation left  -RR (r) DE (t) RR (c)       Row Name 04/18/25 7244          Plan of Care Review    Plan of Care Reviewed With patient  -RR (r) DE (t) RR (c)     Progress improving  -RR (r) DE (t) RR (c)     Outcome Evaluation Farhat Hein is a 45 y.o. male with a PMHx of HTN, COPD, GERD, DM II, and hx of DVTs [facility], who presented to [facility] on 4/17/2025 with left shoulder pain after a fall d/t dizziness. Shoulder XR revealed a nondisplaced fracture of the left humeral neck and greater tuberosity. Pt lives in a [facility] w/ his wife who currently works and son. Pt's PLOF - independent w/ ADLs, however does needed some assistance w/ shower transfers. He ambulate w/ a rollator/cane PRN as needed for balance and still drives. During this session pt demoed functional mobility near or at his BL. He was able to perform dynamic standing balance and ambulate w/ supervision to SBA w/o AD. CGA was given for safety. BP was assessed in sitting and standing and pt denies dizziness w/ position changes. Pt was hypertensive but stable throughout session. RN notified of high BP. Once medically appropriate for d/c, PT is recommending home w/ extra family support and referral for HHPT for eval and treatment d/t acute humeral fracture and LUE NWB status and in sling, functional mobility at or near BL, and pt preference. PT will sign off on this pt.  -RR (r) DE (t) RR (c)       Row Name 04/18/25 3872          Therapy Assessment/Plan (PT)    Rehab Potential (PT) good  -RR (r) DE (t) RR (c)     Criteria for Skilled Interventions Met (PT) no;no problems  identified which require skilled intervention  -RR (r) DE (t) RR (c)     Therapy Frequency (PT) evaluation only  -RR (r) DE (t) RR (c)       Row Name 04/18/25 1336          Vital Signs    Pre Systolic BP Rehab 146  -RR (r) DE (t) RR (c)     Pre Treatment Diastolic BP 92  sitting  -RR (r) DE (t) RR (c)     Intra Systolic BP Rehab 127  -RR (r) DE (t) RR (c)     Intra Treatment Diastolic BP 99  standing  -RR (r) DE (t) RR (c)     Post Systolic BP Rehab 137  -RR (r) DE (t) RR (c)     Post Treatment Diastolic BP 91  post-session  -RR (r) DE (t) RR (c)     Posttreatment Heart Rate (beats/min) --  WNL  -RR (r) DE (t) RR (c)     Pre SpO2 (%) --  WNL  -RR (r) DE (t) RR (c)     O2 Delivery Pre Treatment room air  -RR (r) DE (t) RR (c)     Pre Patient Position Sitting  -RR (r) DE (t) RR (c)     Intra Patient Position Standing  -RR (r) DE (t) RR (c)     Post Patient Position Sitting  -RR (r) DE (t) RR (c)               User Key  (r) = Recorded By, (t) = Taken By, (c) = Cosigned By      Initials Name Provider Type    RR Licha Craig, PT Physical Therapist    David Jara, PT Student PT Student                   Outcome Measures       Row Name 04/18/25 1343          How much help from another person do you currently need...    Turning from your back to your side while in flat bed without using bedrails? 4  -RR (r) DE (t) RR (c)     Moving from lying on back to sitting on the side of a flat bed without bedrails? 4  -RR (r) DE (t) RR (c)     Moving to and from a bed to a chair (including a wheelchair)? 4  -RR (r) DE (t) RR (c)     Standing up from a chair using your arms (e.g., wheelchair, bedside chair)? 4  -RR (r) DE (t) RR (c)     Climbing 3-5 steps with a railing? 4  -RR (r) DE (t) RR (c)     To walk in hospital room? 4  -RR (r) DE (t) RR (c)     AM-PAC 6 Clicks Score (PT) 24  -RR (r) DE (t)               User Key  (r) = Recorded By, (t) = Taken By, (c) = Cosigned By      Initials Name Provider Type    SONYA Craig  Licha, PT Physical Therapist    David Jara, PT Student PT Student                                 Physical Therapy Education       Title: PT OT SLP Therapies (In Progress)       Topic: Physical Therapy (In Progress)       Point: Mobility training (Done)       Learning Progress Summary            Patient MING Humphrey VU by DE at 4/18/2025 7524                      Point: Home exercise program (Not Started)       Learner Progress:  Not documented in this visit.              Point: Body mechanics (Not Started)       Learner Progress:  Not documented in this visit.              Point: Precautions (Not Started)       Learner Progress:  Not documented in this visit.                              User Key       Initials Effective Dates Name Provider Type Discipline    DE 01/28/25 -  David Nunez, PT Student PT Student PT                  PT Recommendation and Plan     Progress: improving  Outcome Evaluation: Farhat Hein is a 45 y.o. male with a PMHx of HTN, COPD, GERD, DM II, and [facility]x of DVTs [facility], who [facility] on 4/17/2025 with left shoulder pain after a fall d/t dizziness. Shoulder XR revealed a nondisplaced fracture of the left humeral neck and greater tuberosity. Pt lives in a [facility] w/ his wife who currently works and son. Pt's PLOF - independent w/ ADLs, however does needed some assistance w/ shower transfers. He ambulate w/ a rollator/cane PRN as needed for balance and still drives. During this session pt demoed functional mobility near or at his BL. He was able to perform dynamic standing balance and ambulate w/ supervision to SBA w/o AD. CGA was given for safety. BP was assessed in sitting and standing and pt denies dizziness w/ position changes. Pt was hypertensive but stable throughout session. RN notified of high BP. Once medically appropriate for d/c, PT is recommending home w/ extra family support and referral for HHPT for eval and treatment  d/t acute humeral fracture and LUE NWB status and in sling, functional mobility at or near BL, and pt preference. PT will sign off on this pt.     Time Calculation:   PT Evaluation Complexity  History, PT Evaluation Complexity: 3 or more personal factors and/or comorbidities  Examination of Body Systems (PT Eval Complexity): 1-2 elements  Clinical Presentation (PT Evaluation Complexity): stable  Clinical Decision Making (PT Evaluation Complexity): low complexity  Overall Complexity (PT Evaluation Complexity): low complexity     PT Charges       Row Name 04/18/25 1344             Time Calculation    Start Time 1135  -RR (r) DE (t) RR (c)      Stop Time 1202  -RR (r) DE (t) RR (c)      Time Calculation (min) 27 min  -RR (r) DE (t)      PT Received On 04/18/25  -RR (r) DE (t) RR (c)         Time Calculation- PT    Total Timed Code Minutes- PT 0 minute(s)  -RR (r) DE (t) RR (c)                User Key  (r) = Recorded By, (t) = Taken By, (c) = Cosigned By      Initials Name Provider Type    RR Licha Craig, PT Physical Therapist    David Jara, PT Student PT Student                  Therapy Charges for Today       Code Description Service Date Service Provider Modifiers Qty    [id number]  PT EVAL LOW COMPLEXITY 4 4/18/2025 David Nunez PT Student GP 1            PT G-Codes  AM-PAC 6 Clicks Score (PT): 24  PT Discharge Summary  Anticipated Discharge Disposition (PT): home, home with assist, home with outpatient therapy services    GLENN Carrero  4/18/2025

## 2025-04-18 NOTE — DISCHARGE SUMMARY
[facility] Medicine Services  Discharge Summary    Date of Service: 2025  Patient Name: Farhat Hein  : 1980  MRN: [id number]    Date of Admission: 2025  Discharge Diagnosis:     Humeral surgical neck fracture-Left  Lightheadedness  History of DVT  History of factor V Leyden deficiency  Hypertension history    Date of Discharge: 2025  Primary Care Physician: Farhat Allred,         Hospital Course     Hospital Course:    This patient is a 45-year-old gentleman who presented status post a fall at home.  Patient said he stood up at home and felt lightheaded and and fell down onto his left side hurting his left shoulder.  X-ray of the left shoulder showed the following findings below:-  Questionable nondisplaced transversely oriented fracture of the left humeral neck.  Nondisplaced fracture of the left humeral greater tuberosity.  No dislocation.  Moderate degenerative change of the left AC joint.  Gentleman who presented status post a fall    He was seen by orthopedic surgery who recommended nonoperative management.  A sling was recommended to avoid any significant range of motion of the left shoulder.  It was recommended that he be nonweightbearing.  As needed pain medications were ordered and given as needed.  Patient was advised by orthopedics to follow-up with them in the outpatient clinic particularly with Dr. Fregoso in 1 week for reevaluation and new x-rays.    Patient had no further episodes of lightheadedness during hospital course.  CT scan of the head and cervical spine were negative for acute processes.  Orthostatic vital signs were checked and he was not orthostatic.  An echocardiogram was done.  See summary of echo report below:-    Impression  Technically difficult study.  Patient is sitting up in chair.  Left arm in arm brace.  Difficult to visualize valves but no obvious abnormalities were seen.  Left ventricular size and contractility is normal with ejection  fraction of 60%.  Mild concentric left hypertrophy is present.  Grade 1 left ventricular diastolic dysfunction is present.  No pericardial effusion is present.    He is being discharged home today with instructions to follow-up with orthopedics in 1 week.  Recommendations for physical therapy per orthopedics after his 1 week outpatient visit.      DISCHARGE Follow Up Recommendations:-Follow-up PCP in 1 week, follow-up with orthopedics in 1 week.      Day of Discharge     Vital Signs:  Temp:  [97.4 °F (36.3 °C)-98.4 °F (36.9 °C)] 97.4 °F (36.3 °C)  Heart Rate:  [75-92] 89  Resp:  [11-21] 16  BP: (125-147)/(78-99) 127/99    Physical Exam:  Physical Exam  Constitutional:       Appearance: Normal appearance.   HENT:      Head: Normocephalic and atraumatic.      Nose: Nose normal.      Mouth/Throat:      Mouth: Mucous membranes are moist.   Eyes:      Conjunctiva/sclera: Conjunctivae normal.      Pupils: Pupils are equal, round, and reactive to light.   Cardiovascular:      Rate and Rhythm: Normal rate and regular rhythm.      Pulses: Normal pulses.      Heart sounds: Normal heart sounds.   Pulmonary:      Effort: Pulmonary effort is normal.      Breath sounds: Normal breath sounds.   Abdominal:      General: Abdomen is flat. Bowel sounds are normal.      Palpations: Abdomen is soft.   Musculoskeletal:      Cervical back: Neck supple.   Skin:     General: Skin is warm and dry.      Capillary Refill: Capillary refill takes less than 2 seconds.   Neurological:      General: No focal deficit present.      Mental Status: He is alert and oriented to person, place, and time.   Psychiatric:         Mood and Affect: Mood normal.         Behavior: Behavior normal.         Thought Content: Thought content normal.         Judgment: Judgment normal.            Pertinent  and/or Most Recent Results     LAB RESULTS:      Lab 04/18/25  0512 04/17/25  0937   WBC 9.23 14.78*   HEMOGLOBIN 12.6* 15.6   HEMATOCRIT 41.4 49.2   PLATELETS 188  253   NEUTROS ABS 5.60 11.86*   IMMATURE GRANS (ABS) 0.03 0.07*   LYMPHS ABS 2.50 1.56   MONOS ABS 0.84 1.02*   EOS ABS 0.23 0.19   MCV 92.2 90.3         Lab 04/18/25  0512 04/17/25  1034 04/17/25  0937   SODIUM 138 137  --    POTASSIUM 3.7 3.4*  --    CHLORIDE 100 99  --    CO2 29.7* 28.8  --    ANION GAP 8.3 9.2  --    BUN 19 19  --    CREATININE 1.03 0.97  --    EGFR 90.7 97.5  --    GLUCOSE 122* 162*  --    CALCIUM 8.6 8.9  --    MAGNESIUM  --  1.9  --    TSH  --   --  0.694         Lab 04/17/25  1034   TOTAL PROTEIN 8.1   ALBUMIN 3.9   GLOBULIN 4.2   ALT (SGPT) 26   AST (SGOT) 22   BILIRUBIN 0.5   ALK PHOS 83         Lab 04/17/25  1120 04/17/25  1034 04/17/25  0937   PROBNP  --   --  <36.0   HSTROP T 9 9  --                  Brief Urine Lab Results  (Last result in the past 365 days)        Color   Clarity   Blood   Leuk Est   Nitrite   Protein   CREAT   Urine HCG        04/18/25 1206 Yellow   Clear   Negative   Negative   Negative   Negative                 Microbiology Results (last 10 days)       ** No results found for the last 240 hours. **            CT Head Without Contrast  Result Date: 4/17/2025  Impression: Impression: Head: No acute intracranial abnormality. Cervical spine: 1. No cervical spine fracture within limited field-of-view of the exam. Cervical spine only visualized to C5-6 level due to habitus and difficulty with positioning. If there is further concern for lower cervical spine fracture, cervical radiographs may be helpful. 2. Cervical spondylosis above. Electronically Signed: Timothy Soriano MD  4/17/2025 10:51 AM EDT  Workstation ID: [id number]    CT Cervical Spine Without Contrast  Result Date: 4/17/2025  Impression: Impression: Head: No acute intracranial abnormality. Cervical spine: 1. No cervical spine fracture within limited field-of-view of the exam. Cervical spine only visualized to C5-6 level due to habitus and difficulty with positioning. If there is further concern for lower cervical  spine fracture, cervical radiographs may be helpful. 2. Cervical spondylosis above. Electronically Signed: Timothy Soriano MD  4/17/2025 10:51 AM EDT  Workstation ID: [id number]    XR Shoulder 2+ View Left  Result Date: 4/17/2025  Impression: Impression: Questionable nondisplaced transversely oriented fracture of the left humeral neck. Nondisplaced fracture of the left humeral greater tuberosity. No dislocation. Moderate degenerative change of the left AC joint. Electronically Signed: Kianna Cabrera MD  4/17/2025 10:31 AM EDT  Workstation ID: [id number]    XR Chest 1 View  Result Date: 4/17/2025  Impression: Impression: No active disease. Electronically Signed: Figueroa Cross MD  4/17/2025 10:30 AM EDT  Workstation ID: [id number]      Results for orders placed during the hospital encounter of 03/17/25    Doppler Ankle Brachial Index Single Level CAR    Interpretation Summary    Right Conclusion: The right [place]ENNE is unable to be assessed due to vessel incompressibility. Unable to assess digital insufficiency.    Left Conclusion: The left [place] is unable to be assessed due to vessel incompressibility. Unable to assess digital insufficiency.    Waveforms are multiphasic and do not suggest significant arterial insufficiency      Results for orders placed during the hospital encounter of 03/17/25    Doppler Ankle Brachial Index Single Level CAR    Interpretation Summary    Right Conclusion: The right [place]ENNE is unable to be assessed due to vessel incompressibility. Unable to assess digital insufficiency.    Left Conclusion: The left [place] is unable to be assessed due to vessel incompressibility. Unable to assess digital insufficiency.    Waveforms are multiphasic and do not suggest significant arterial insufficiency      Results for orders placed during the hospital encounter of 04/17/25    Adult Transthoracic Echo Complete w/ Color, Spectral and Contrast if Necessary Per Protocol    Interpretation  Summary  Indications  Syncope    Technically difficult study.  Patient is sitting up in chair.  Left arm in arm brace.  Mitral valve is structurally normal.  Tricuspid valve is structurally normal.  Aortic valve is structurally normal.  Difficult to visualize.  Pulmonic valve could not be well visualized.  No evidence for mitral tricuspid or aortic regurgitation is seen by Doppler study in the views obtained..  Left atrium is normal in size.  Right atrium is normal in size.  Left ventricle is normal in size and contractility with ejection fraction of 60%.  Mild concentric left ventricle hypertrophy is present.  Grade 1 left ventricular diastolic dysfunction is present.  Right ventricle is normal in size.  Atrial septum is not visualized.  Aorta is normal.  No pericardial effusion or intracardiac thrombus is seen.    Impression  Technically difficult study.  Patient is sitting up in chair.  Left arm in arm brace.  Difficult to visualize valves but no obvious abnormalities were seen.  Left ventricular size and contractility is normal with ejection fraction of 60%.  Mild concentric left hypertrophy is present.  Grade 1 left ventricular diastolic dysfunction is present.  No pericardial effusion is present.      Labs Pending at Discharge:  Pending Results       None            Procedures Performed           Consults:   Consults       Date and Time Order Name Status Description    4/17/2025  3:22 PM Ortho (on-call MD unless specified) Completed     4/17/2025  3:21 PM Inpatient Orthopedic Surgery Consult Completed     4/17/2025 11:22 AM Hospitalist (on-call MD unless specified)      3/18/2025 12:37 AM Inpatient Infectious Diseases Consult Completed               Discharge Details        Discharge Medications        New Medications        Instructions Start Date   oxyCODONE 5 MG immediate release tablet  Commonly known as: ROXICODONE   5 mg, Oral, Every 4 Hours PRN             Continue These Medications        Instructions  Start Date   albuterol sulfate  (90 Base) MCG/ACT inhaler  Commonly known as: PROVENTIL HFA;VENTOLIN HFA;PROAIR HFA   2 puffs, Inhalation, Every 4 Hours PRN      budesonide-formoterol 80-4.5 MCG/ACT inhaler  Commonly known as: Symbicort   2 puffs, Inhalation, 2 Times Daily - RT      carvedilol 25 MG tablet  Commonly known as: COREG   25 mg, Oral, 2 Times Daily With Meals      Eliquis 5 MG tablet tablet  Generic drug: apixaban   5 mg, Oral, 2 Times Daily      hydrALAZINE 25 MG tablet  Commonly known as: APRESOLINE   25 mg, Oral, 3 Times Daily      hydroCHLOROthiazide 25 MG tablet   25 mg, Oral, Daily      multivitamin tablet tablet   1 tablet, Daily      pantoprazole 40 MG EC tablet  Commonly known as: PROTONIX   40 mg, 2 Times Daily      potassium chloride 10 MEQ CR capsule  Commonly known as: MICRO-K   10 mEq, Oral, Daily      rOPINIRole 0.25 MG tablet  Commonly known as: REQUIP   0.25 mg, Nightly      sertraline 25 MG tablet  Commonly known as: Zoloft   25 mg, Oral, Daily      torsemide 100 MG tablet  Commonly known as: DEMADEX   100 mg, Oral, Daily      vitamin D3 125 MCG (5000 UT) capsule capsule   5,000 Units, Oral, Daily               Allergies   Allergen Reactions    Promethazine Itching    Vancomycin Anaphylaxis    Calamine-Zinc Oxide Itching and Rash         Discharge Disposition:   Home or Self Care    Diet: Cardiac diet      Discharge Activity:   Activity Instructions       Activity as Tolerated                CODE STATUS:  Code Status and Medical Interventions: CPR (Attempt to Resuscitate); Full Support   Ordered at: 04/17/25 1207     Code Status (Patient has no pulse and is not breathing):    CPR (Attempt to Resuscitate)     Medical Interventions (Patient has pulse or is breathing):    Full Support         Future Appointments   Date Time Provider Department Center   4/21/2025  1:30 PM Farhat Allred DO [facility]   4/24/2025  1:15 PM  [facility]  [facility] None        Additional Instructions for the Follow-ups that You Need to Schedule       Discharge Follow-up with PCP   As directed       Currently Documented PCP:    Farhat Allred DO    PCP Phone Number:    [phone]     Follow Up Details: 1 week        Discharge Follow-up with Specified Provider: Orthopedics; 1 Week   As directed      To: Orthopedics   Follow Up: 1 Week                Time spent on Discharge including face to face service: Less than 30 minutes.    Signature: Electronically signed by Amanda Méndez MD, 04/18/25, 17:17 EDT.  [facility] Hospitalist Team

## 2025-04-18 NOTE — PLAN OF CARE
Goal Outcome Evaluation:  Plan of Care Reviewed With: patient           Outcome Evaluation: Patient stable.  Up with assistance and no dizziness reported.  Room air.  Pain meds given routinely, po and IV.  A combo of both really helps with pain.  Sling intact .  Patient up in recliner most of the night.  Alert and oriented.  Patient refuses alarms because he is compliant with using the call light when he wants to get to the bathroom. Ortho to see patient and evaluate.  No numbness and tingling in left arm. Wound consult called.

## 2025-04-18 NOTE — PLAN OF CARE
Goal Outcome Evaluation:  Plan of Care Reviewed With: patient        Progress: improving  Outcome Evaluation: Farhat Hein is a 45 y.o. male with a PMHx of HTN, COPD, GERD, DM II, and hx of DVTs with factor V Leiden mutation on Eliquis, who presented to [facility] on 4/17/2025 with left shoulder pain after a fall d/t dizziness. Shoulder XR revealed a nondisplaced fracture of the left humeral neck and greater tuberosity. Pt lives in a H w/ his wife who currently works and son. Pt's PLOF - independent w/ ADLs, however does needed some assistance w/ shower transfers. He ambulate w/ a rollator/cane PRN as needed for balance and still drives. During this session pt demoed functional mobility near or at his BL. He was able to perform dynamic standing balance and ambulate w/ supervision to SBA w/o AD. CGA was given for safety. BP was assessed in sitting and standing and pt denies dizziness w/ position changes. Pt was hypertensive but stable throughout session. RN notified of high BP. Once medically appropriate for d/c, PT is recommending home w/ extra family support and referral for HHPT for eval and treatment d/t acute humeral fracture and LUE NWB status and in sling, functional mobility at or near BL, and pt preference. PT will sign off on this pt.    Anticipated Discharge Disposition (PT): home, home with assist, home with outpatient therapy services

## 2025-04-18 NOTE — CASE MANAGEMENT/SOCIAL WORK
Continued Stay Note  [facility]     Patient Name: Farhat Hein  MRN: [id number]  Today's Date: 4/18/2025    Admit Date: 4/17/2025    Plan: From Home with Family; Requested PT/OT Evals   Discharge Plan       Row Name 04/18/25 1258       Plan    Plan Comments Per MD patient does not require any HHC and will discharge today. He has a post op f/u with ortho at one week and they will order needed therapies at that appt. CM updated PT/OT.             Amie Paulino RN      [facility]  Office: [phone]  Cell: [phone]  Fax # [phone]

## 2025-04-18 NOTE — PLAN OF CARE
Please see PT note for mobilization. Pt anticipates to dc home and follow up with Ortho. Per PT, educated on LUE restrictions and educated on compensatory strategies. Will complete orders.

## 2025-04-18 NOTE — OUTREACH NOTE
Prep Survey      Flowsheet Row Responses   [facility] facility patient discharged from? [place]   Is LACE score < 7 ? No   Eligibility [facility]   Date of Admission 04/17/25   Date of Discharge 04/18/25   Discharge Disposition Home or Self Care   Discharge diagnosis Humeral surgical neck fracture   Does the patient have one of the following disease processes/diagnoses(primary or secondary)? Other   Does the patient have Home health ordered? No   Is there a DME ordered? No   Prep survey completed? Yes            SARAH BETH ROBERTO - Registered Nurse

## 2025-04-21 ENCOUNTER — TRANSITIONAL CARE MANAGEMENT TELEPHONE ENCOUNTER (OUTPATIENT)
Dept: CALL CENTER | Facility: HOSPITAL | Age: 45
End: 2025-04-21
Payer: MEDICARE

## 2025-04-21 NOTE — OUTREACH NOTE
Call Center TCM Note      Flowsheet Row Responses   [facility] patient discharged from? [place]   Does the patient have one of the following disease processes/diagnoses(primary or secondary)? Other   TCM attempt successful? Yes   Call start time 1351   Call end time 1355   Discharge diagnosis Humeral surgical neck fracture   Meds reviewed with patient/caregiver? Yes   Is the patient having any side effects they believe may be caused by any medication additions or changes? No   Does the patient have all medications ordered at discharge? Yes   Is the patient taking all medications as directed (includes completed medication regime)? Yes   Comments Pt has a new pt/hosp dc fu apt on 5/12/25   Does the patient have an appointment with their PCP within 7-14 days of discharge? Other   Nursing Interventions Routed TCM call to PCP office   Has home health visited the patient within 72 hours of discharge? N/A   Psychosocial issues? No   Did the patient receive a copy of their discharge instructions? Yes   Nursing interventions Reviewed instructions with patient   What is the patient's perception of their health status since discharge? Same  [still having pain]   Is the patient/caregiver able to teach back signs and symptoms related to disease process for when to call PCP? Yes   Is the patient/caregiver able to teach back signs and symptoms related to disease process for when to call 911? Yes   Is the patient/caregiver able to teach back the hierarchy of who to call/visit for symptoms/problems? PCP, Specialist, Home health nurse, Urgent Care, ED, 911 Yes   If the patient is a current smoker, are they able to teach back resources for cessation? Not a smoker   TCM call completed? Yes   Call end time 1355            [facility] - Registered Nurse    4/21/2025, 14:00 EDT

## 2025-04-29 ENCOUNTER — READMISSION MANAGEMENT (OUTPATIENT)
Dept: CALL CENTER | Facility: HOSPITAL | Age: 45
End: 2025-04-29
Payer: MEDICARE

## 2025-04-29 NOTE — OUTREACH NOTE
Medical Week 2 Survey      Flowsheet Row Responses   [facility] facility patient discharged from? [place]   Does the patient have one of the following disease processes/diagnoses(primary or secondary)? Other   Week 2 attempt successful? No   Unsuccessful attempts Attempt 1            Bianca BALDERAS - Registered Nurse

## 2025-05-05 ENCOUNTER — TELEMEDICINE (OUTPATIENT)
Dept: FAMILY MEDICINE CLINIC | Facility: TELEHEALTH | Age: 45
End: 2025-05-05
Payer: MEDICARE

## 2025-05-05 DIAGNOSIS — J11.1 INFLUENZA: Primary | ICD-10-CM

## 2025-05-05 RX ORDER — BENZONATATE 200 MG/1
200 CAPSULE ORAL 3 TIMES DAILY PRN
Qty: 21 CAPSULE | Refills: 0 | Status: SHIPPED | OUTPATIENT
Start: 2025-05-05

## 2025-05-05 RX ORDER — ONDANSETRON 8 MG/1
8 TABLET, ORALLY DISINTEGRATING ORAL EVERY 8 HOURS PRN
Qty: 21 TABLET | Refills: 0 | Status: SHIPPED | OUTPATIENT
Start: 2025-05-05

## 2025-05-05 RX ORDER — PREDNISONE 10 MG/1
TABLET ORAL
Qty: 21 TABLET | Refills: 0 | Status: SHIPPED | OUTPATIENT
Start: 2025-05-05

## 2025-05-05 RX ORDER — OXYCODONE AND ACETAMINOPHEN 5; 325 MG/1; MG/1
TABLET ORAL
COMMUNITY
Start: 2025-04-22

## 2025-05-05 RX ORDER — OSELTAMIVIR PHOSPHATE 75 MG/1
75 CAPSULE ORAL 2 TIMES DAILY
Qty: 10 CAPSULE | Refills: 0 | Status: SHIPPED | OUTPATIENT
Start: 2025-05-05 | End: 2025-05-10

## 2025-05-05 NOTE — PROGRESS NOTES
You have chosen to receive care through a telehealth visit.  Do you consent to use a video/audio connection for your medical care today? Yes     Patient or patient representative verbalized consent for the use of Ambient Listening during the visit with  SAMI Zhou for chart documentation. 5/5/2025  11:35 EDT    CHIEF COMPLAINT  No chief complaint on file.        HPI  History of Present Illness  The patient is a 45-year-old male presenting with complaints of nausea, vomiting, diarrhea, cough, and congestion.    He reports experiencing a severe headache upon awakening this morning, accompanied by fever, runny nose, and congestion. Subsequently, he developed symptoms of vomiting and diarrhea. His recorded temperature was 101.2 degrees Fahrenheit. He tested negative for COVID-19 this morning but has not undergone influenza testing.    He has a history of COPD and possesses an inhaler at home. He has found relief from his cough symptoms with Tessalon Perles.       Review of Systems  See HPI    Past Medical History:   Diagnosis Date    Allergic 1979    seasonal    Arthritis 01/05/2020    legs    Asthma 01/2024    Chronic obstructive pulmonary disease 07/17/2023    COPD (chronic obstructive pulmonary disease) 04/01/2011    GERD (gastroesophageal reflux disease) 06/06/2006    Hypertension 06/06/2006    Kidney stone 08/08/2010    Low back pain 01/01/2020    Obesity 01/21/1997    Pneumonia 11/2023    Renal insufficiency 03/2021    Stroke 01/21/2015 01/21/2016    Substance abuse     Type 2 diabetes mellitus 07/27/2019    Urinary tract infection 03/15/2020       Family History   Problem Relation Age of Onset    Arthritis Mother     COPD Mother     Diabetes Mother     Hyperlipidemia Mother     Hypertension Mother     Alcohol abuse Father     Arthritis Father     Cancer Father         SKIN    Diabetes Father     Heart disease Father     Hyperlipidemia Father     Hypertension Father     Obesity Father      Arthritis Sister     Cancer Sister         BREAST    Diabetes Sister     Hyperlipidemia Sister     Heart disease Sister     Hypertension Sister     Obesity Sister     Sleep apnea Sister     Arthritis Brother     Cancer Brother         KIDNEY/BRAIN/SKIN    Hyperlipidemia Brother     Kidney disease Brother     Hypertension Brother     Alcohol abuse Brother     Mental illness Brother         schizophrenia    Cancer Sister         COLON    Diabetes Sister             Hyperlipidemia Sister     Hypertension Sister     Diabetes Brother     Heart disease Brother     Hyperlipidemia Brother     Hypertension Brother     Alcohol abuse Brother     Cancer Maternal Grandmother     Cancer Maternal Uncle        Social History     Socioeconomic History    Marital status:    Tobacco Use    Smoking status: Former     Current packs/day: 0.00     Average packs/day: 2.0 packs/day for 10.4 years (20.8 ttl pk-yrs)     Types: Cigarettes     Start date: 1998     Quit date: 2008     Years since quittin.8     Passive exposure: Past    Smokeless tobacco: Never   Vaping Use    Vaping status: Never Used   Substance and Sexual Activity    Alcohol use: Not Currently     Alcohol/week: 12.0 standard drinks of alcohol     Types: 12 Cans of beer per week     Comment: PER DAY    Drug use: Yes     Frequency: 7.0 times per week     Types: Marijuana    Sexual activity: Yes     Partners: Female     Birth control/protection: Condom       Farhat Hein  reports that he quit smoking about 16 years ago. His smoking use included cigarettes. He started smoking about 27 years ago. He has a 20.8 pack-year smoking history. He has been exposed to tobacco smoke. He has never used smokeless tobacco.             There were no vitals taken for this visit.    PHYSICAL EXAM  Physical Exam   Constitutional: He is oriented to person, place, and time. He appears well-developed and well-nourished. He does not have a sickly appearance. He appears ill.    HENT:   Head: Normocephalic and atraumatic.   Pulmonary/Chest: Effort normal.  No respiratory distress (persistent cough during visit; no audible wheezing).  Neurological: He is alert and oriented to person, place, and time.           Diagnoses and all orders for this visit:    1. Influenza (Primary)  -     oseltamivir (Tamiflu) 75 MG capsule; Take 1 capsule by mouth 2 (Two) Times a Day for 5 days.  Dispense: 10 capsule; Refill: 0  -     ondansetron ODT (ZOFRAN-ODT) 8 MG disintegrating tablet; Place 1 tablet on the tongue Every 8 (Eight) Hours As Needed for Nausea or Vomiting.  Dispense: 21 tablet; Refill: 0  -     predniSONE (DELTASONE) 10 MG (21) dose pack; Use as directed on package  Dispense: 21 tablet; Refill: 0  -     benzonatate (TESSALON) 200 MG capsule; Take 1 capsule by mouth 3 (Three) Times a Day As Needed for Cough.  Dispense: 21 capsule; Refill: 0    --take medications as prescribed  --increase fluids, rest as needed, tylenol or ibuprofen for pain  --f/u in 5-7 days if no improvement      Assessment & Plan  1. Influenza.  - Symptoms include nausea, vomiting, diarrhea, cough, congestion, headache, fever of 101.2°F, runny nose.  - Negative COVID test reported; no flu test performed.  - Discussion included treating for influenza based on clinical presentation.  - Tamiflu 75 mg twice daily for 5 days and Zofran 4 mg every 8 hours as needed for nausea and vomiting prescribed. Medication sent to pharmacy.    2. Wheezing.  - Wheezing noted during the encounter.  - Prednisone added to the treatment plan.  - Advised to use inhaler, 2 puffs every 4 hours as needed for wheezing.    3. Cough.  - Persistent cough reported.  - Tessalon Perles 100 mg every 8 hours as needed for cough prescribed.         FOLLOW-UP  As discussed during visit with PCP/[facility] if no improvement or Urgent Care/Emergency Department if worsening of symptoms    Patient verbalizes understanding of medication dosage, comfort measures,  instructions for treatment and follow-up.    Kianna HALLGildardo Loja, APRN  05/05/2025  11:35 EDT    Mode of Visit: Video  Location of patient: -HOME-  Location of provider: +HOME+  You have chosen to receive care through a telehealth visit.  The patient has signed the video visit consent form.  The visit included audio and video interaction. No technical issues occurred during this visit.    The use of a video visit has been reviewed with the patient and verbal informed consent has been obtained. Myself and Farhat Hein     participated in this visit. The patient is located in [address] [place] [address] [place] [facility]  I am located in [place], [place]. [employer] and [employer] Video Client were utilized. I spent 1 minutes in the patient's chart for this visit.      Note Disclaimer: At [facility], we believe that sharing information builds trust and better   relationships. You are receiving this note because you recently visited [facility]. It is possible you   will see health information before a provider has talked with you about it. This kind of information can   be easy to misunderstand. To help you fully understand what it means for your health, we urge you to   discuss this note with your provider.

## 2025-05-06 ENCOUNTER — READMISSION MANAGEMENT (OUTPATIENT)
Dept: CALL CENTER | Facility: HOSPITAL | Age: 45
End: 2025-05-06
Payer: MEDICARE

## 2025-05-06 NOTE — OUTREACH NOTE
Medical Week 3 Survey      Flowsheet Row Responses   [facility] [place], [facility] by [facility] patient discharged from? [place]   Does the patient have one of the following disease processes/diagnoses(primary or secondary)? Other   Week 3 attempt successful? Yes   Call start time 1425   Call end time 1427   Discharge diagnosis Humeral surgical neck fracture   Meds reviewed with patient/caregiver? Yes   Is the patient taking all medications as directed (includes completed medication regime)? Yes   Does the patient have a primary care provider?  Yes   Does the patient have an appointment with their PCP within 7 days of discharge? Yes   Has the patient kept scheduled appointments due by today? Yes   Has home health visited the patient within 72 hours of discharge? N/A   Psychosocial issues? No   Did the patient receive a copy of their discharge instructions? Yes   Nursing interventions Reviewed instructions with patient   What is the patient's perception of their health status since discharge? Improving   Is the patient/caregiver able to teach back the hierarchy of who to call/visit for symptoms/problems? PCP, Specialist, Home health nurse, Urgent Care, ED, 911 Yes   Week 3 Call Completed? Yes   Graduated Yes   Graduated/Revoked comments Pt reports he is doing ok still having pain., but ortho ordered more pain med. Pt has another FU with ortho coming up. No needs   Call end time 1427            CRISTA DALEY - Registered Nurse

## 2025-05-07 ENCOUNTER — TELEPHONE (OUTPATIENT)
Dept: SURGERY | Facility: CLINIC | Age: 45
End: 2025-05-07
Payer: MEDICARE

## 2025-05-07 NOTE — TELEPHONE ENCOUNTER
SPOKE TO PATIENT ABOUT RESCHEDULING APPT AND HE MAY HAVE TO HAVE SHOULDER SURGERY SO HE WILL CALL US BACK TO RESCHEDULE.

## 2025-07-06 ENCOUNTER — TELEMEDICINE (OUTPATIENT)
Dept: FAMILY MEDICINE CLINIC | Facility: TELEHEALTH | Age: 45
End: 2025-07-06
Payer: MEDICARE

## 2025-07-06 DIAGNOSIS — L03.116 CELLULITIS OF LEFT LOWER EXTREMITY: Primary | ICD-10-CM

## 2025-07-06 RX ORDER — TRIAMCINOLONE ACETONIDE 1 MG/G
1 CREAM TOPICAL 2 TIMES DAILY
Qty: 45 G | Refills: 0 | Status: SHIPPED | OUTPATIENT
Start: 2025-07-06

## 2025-07-06 RX ORDER — CEPHALEXIN 500 MG/1
500 CAPSULE ORAL 4 TIMES DAILY
Qty: 28 CAPSULE | Refills: 0 | Status: SHIPPED | OUTPATIENT
Start: 2025-07-06 | End: 2025-07-13

## 2025-07-07 NOTE — PROGRESS NOTES
You have chosen to receive care through a telehealth visit.  Do you consent to use a video/audio connection for your medical care today? Yes     CHIEF COMPLAINT  Chief Complaint   Patient presents with    Rash         HPI  Farhat Hein is a 45 y.o. male  presents with complaint ofredness to left lower leg.  Pt gets cellulitis often and he states that it is just getting started and he wants to treat it before it gets too bad    Review of Systems   Skin:  Positive for rash (left lower leg).   All other systems reviewed and are negative.      Past Medical History:   Diagnosis Date    Allergic 1979    seasonal    Arthritis 2020    legs    Asthma 2024    Chronic obstructive pulmonary disease 2023    COPD (chronic obstructive pulmonary disease) 2011    GERD (gastroesophageal reflux disease) 2006    Hypertension 2006    Kidney stone 2010    Low back pain 2020    Obesity 1997    Pneumonia 2023    Renal insufficiency 2021    Stroke 2015    Substance abuse     Type 2 diabetes mellitus 2019    Urinary tract infection 03/15/2020       Family History   Problem Relation Age of Onset    Arthritis Mother     COPD Mother     Diabetes Mother     Hyperlipidemia Mother     Hypertension Mother     Alcohol abuse Father     Arthritis Father     Cancer Father         SKIN    Diabetes Father     Heart disease Father     Hyperlipidemia Father     Hypertension Father     Obesity Father     Arthritis Sister     Cancer Sister         BREAST    Diabetes Sister     Hyperlipidemia Sister     Heart disease Sister     Hypertension Sister     Obesity Sister     Sleep apnea Sister     Arthritis Brother     Cancer Brother         KIDNEY/BRAIN/SKIN    Hyperlipidemia Brother     Kidney disease Brother     Hypertension Brother     Alcohol abuse Brother     Mental illness Brother         schizophrenia    Cancer Sister         COLON    Diabetes Sister              Hyperlipidemia Sister     Hypertension Sister     Diabetes Brother     Heart disease Brother     Hyperlipidemia Brother     Hypertension Brother     Alcohol abuse Brother     Cancer Maternal Grandmother     Cancer Maternal Uncle        Social History     Socioeconomic History    Marital status:    Tobacco Use    Smoking status: Former     Current packs/day: 0.00     Average packs/day: 2.0 packs/day for 10.4 years (20.8 ttl pk-yrs)     Types: Cigarettes     Start date: 1998     Quit date: 2008     Years since quittin.0     Passive exposure: Past    Smokeless tobacco: Never   Vaping Use    Vaping status: Never Used   Substance and Sexual Activity    Alcohol use: Not Currently     Alcohol/week: 12.0 standard drinks of alcohol     Types: 12 Cans of beer per week     Comment: PER DAY    Drug use: Yes     Frequency: 7.0 times per week     Types: Marijuana    Sexual activity: Yes     Partners: Female     Birth control/protection: Condom       Farhat Hein  reports that he quit smoking about 17 years ago. His smoking use included cigarettes. He started smoking about 27 years ago. He has a 20.8 pack-year smoking history. He has been exposed to tobacco smoke. He has never used smokeless tobacco.          There were no vitals taken for this visit.    PHYSICAL EXAM  Physical Exam   Constitutional: He appears well-developed and well-nourished.   HENT:   Head: Normocephalic.   Eyes: Pupils are equal, round, and reactive to light.   Pulmonary/Chest: Effort normal.   Musculoskeletal: Normal range of motion.   Neurological: He is alert.   Skin: Rash (left lower leg) noted. There is erythema.   Psychiatric: He has a normal mood and affect.       Results for orders placed or performed during the hospital encounter of 25   ECG 12 Lead Other; dizziness    Collection Time: 25  9:24 AM   Result Value Ref Range    QT Interval 384 ms    QTC Interval 421 ms   BNP    Collection Time: 25  9:37 AM     Specimen: Arm, Right; Blood   Result Value Ref Range    proBNP <36.0 0.0 - 450.0 pg/mL   TSH Rfx On Abnormal To Free T4    Collection Time: 04/17/25  9:37 AM    Specimen: Arm, Right; Blood   Result Value Ref Range    TSH 0.694 0.270 - 4.200 uIU/mL   CBC Auto Differential    Collection Time: 04/17/25  9:37 AM    Specimen: Arm, Right; Blood   Result Value Ref Range    WBC 14.78 (H) 3.40 - 10.80 10*3/mm3    RBC 5.45 4.14 - 5.80 10*6/mm3    Hemoglobin 15.6 13.0 - 17.7 g/dL    Hematocrit 49.2 37.5 - 51.0 %    MCV 90.3 79.0 - 97.0 fL    MCH 28.6 26.6 - 33.0 pg    MCHC 31.7 31.5 - 35.7 g/dL    RDW 14.6 12.3 - 15.4 %    RDW-SD 48.2 37.0 - 54.0 fl    MPV 11.6 6.0 - 12.0 fL    Platelets 253 140 - 450 10*3/mm3    Neutrophil % 80.2 (H) 42.7 - 76.0 %    Lymphocyte % 10.6 (L) 19.6 - 45.3 %    Monocyte % 6.9 5.0 - 12.0 %    Eosinophil % 1.3 0.3 - 6.2 %    Basophil % 0.5 0.0 - 1.5 %    Immature Grans % 0.5 0.0 - 0.5 %    Neutrophils, Absolute 11.86 (H) 1.70 - 7.00 10*3/mm3    Lymphocytes, Absolute 1.56 0.70 - 3.10 10*3/mm3    Monocytes, Absolute 1.02 (H) 0.10 - 0.90 10*3/mm3    Eosinophils, Absolute 0.19 0.00 - 0.40 10*3/mm3    Basophils, Absolute 0.08 0.00 - 0.20 10*3/mm3    Immature Grans, Absolute 0.07 (H) 0.00 - 0.05 10*3/mm3    nRBC 0.0 0.0 - 0.2 /100 WBC   Comprehensive Metabolic Panel    Collection Time: 04/17/25 10:34 AM    Specimen: Arm, Right; Blood   Result Value Ref Range    Glucose 162 (H) 65 - 99 mg/dL    BUN 19 6 - 20 mg/dL    Creatinine 0.97 0.76 - 1.27 mg/dL    Sodium 137 136 - 145 mmol/L    Potassium 3.4 (L) 3.5 - 5.2 mmol/L    Chloride 99 98 - 107 mmol/L    CO2 28.8 22.0 - 29.0 mmol/L    Calcium 8.9 8.6 - 10.5 mg/dL    Total Protein 8.1 6.0 - 8.5 g/dL    Albumin 3.9 3.5 - 5.2 g/dL    ALT (SGPT) 26 1 - 41 U/L    AST (SGOT) 22 1 - 40 U/L    Alkaline Phosphatase 83 39 - 117 U/L    Total Bilirubin 0.5 0.0 - 1.2 mg/dL    Globulin 4.2 gm/dL    A/G Ratio 0.9 g/dL    BUN/Creatinine Ratio 19.6 7.0 - 25.0    Anion Gap 9.2  5.0 - 15.0 mmol/L    eGFR 97.5 >60.0 mL/min/1.73   High Sensitivity Troponin T    Collection Time: 04/17/25 10:34 AM    Specimen: Arm, Right; Blood   Result Value Ref Range    HS Troponin T 9 <22 ng/L   Magnesium    Collection Time: 04/17/25 10:34 AM    Specimen: Arm, Right; Blood   Result Value Ref Range    Magnesium 1.9 1.6 - 2.6 mg/dL   High Sensitivity Troponin T 1Hr    Collection Time: 04/17/25 11:20 AM    Specimen: Arm, Right; Blood   Result Value Ref Range    HS Troponin T 9 <22 ng/L    Troponin T Numeric Delta 0 Abnormal if >/=3 ng/L   Adult Transthoracic Echo Complete w/ Color, Spectral and Contrast if Necessary Per Protocol    Collection Time: 04/17/25 10:30 PM   Result Value Ref Range    LVIDd 4.8 cm    LVIDs 3.3 cm    IVSd 1.30 cm    LVPWd 1.30 cm    FS 31.3 %    IVS/LVPW 1.00 cm    ESV(cubed) 35.9 ml    LV Sys Vol (BSA corrected) 16.6 cm2    EDV(cubed) 110.6 ml    LV Smith Vol (BSA corrected) 46.0 cm2    LV mass(C)d 245.7 grams    LVOT area 4.2 cm2    LVOT diam 2.30 cm    EDV(MOD-sp4) 141.0 ml    ESV(MOD-sp4) 50.9 ml    SV(MOD-sp4) 90.1 ml    SVi(MOD-SP4) 29.4 ml/m2    SVi (LVOT) 22.6 ml/m2    EF(MOD-sp4) 63.9 %    MV E max carito 57.7 cm/sec    MV A max carito 66.9 cm/sec    MV dec time 0.20 sec    MV E/A 0.86     MV A dur 0.11 sec    SV(LVOT) 69.4 ml    RVIDd 2.8 cm    LA dimension (2D)  3.6 cm    LV V1 max 94.6 cm/sec    LV V1 max PG 3.6 mmHg    LV V1 mean PG 2.00 mmHg    LV V1 VTI 16.7 cm    Ao pk carito 116.0 cm/sec    Ao max PG 5.4 mmHg    Ao mean PG 3.0 mmHg    Ao V2 VTI 20.1 cm    JOSHUA(I,D) 3.5 cm2    Dimensionless Index 0.83 (DI)    MV max PG 2.25 mmHg    MV mean PG 1.00 mmHg    MV V2 VTI 20.4 cm    MV P1/2t 49.4 msec    MVA(P1/2t) 4.5 cm2    MVA(VTI) 3.4 cm2    MV dec slope 440.0 cm/sec2    RV V1 max PG 2.16 mmHg    RV V1 max 73.4 cm/sec    RV V1 VTI 16.0 cm    PA V2 max 98.4 cm/sec    PA acc time 0.16 sec    Sinus 4.0 cm    STJ 3.9 cm   Basic Metabolic Panel    Collection Time: 04/18/25  5:12 AM     Specimen: Arm, Right; Blood   Result Value Ref Range    Glucose 122 (H) 65 - 99 mg/dL    BUN 19 6 - 20 mg/dL    Creatinine 1.03 0.76 - 1.27 mg/dL    Sodium 138 136 - 145 mmol/L    Potassium 3.7 3.5 - 5.2 mmol/L    Chloride 100 98 - 107 mmol/L    CO2 29.7 (H) 22.0 - 29.0 mmol/L    Calcium 8.6 8.6 - 10.5 mg/dL    BUN/Creatinine Ratio 18.4 7.0 - 25.0    Anion Gap 8.3 5.0 - 15.0 mmol/L    eGFR 90.7 >60.0 mL/min/1.73   CBC Auto Differential    Collection Time: 04/18/25  5:12 AM    Specimen: Arm, Right; Blood   Result Value Ref Range    WBC 9.23 3.40 - 10.80 10*3/mm3    RBC 4.49 4.14 - 5.80 10*6/mm3    Hemoglobin 12.6 (L) 13.0 - 17.7 g/dL    Hematocrit 41.4 37.5 - 51.0 %    MCV 92.2 79.0 - 97.0 fL    MCH 28.1 26.6 - 33.0 pg    MCHC 30.4 (L) 31.5 - 35.7 g/dL    RDW 14.5 12.3 - 15.4 %    RDW-SD 49.1 37.0 - 54.0 fl    MPV 10.5 6.0 - 12.0 fL    Platelets 188 140 - 450 10*3/mm3    Neutrophil % 60.7 42.7 - 76.0 %    Lymphocyte % 27.1 19.6 - 45.3 %    Monocyte % 9.1 5.0 - 12.0 %    Eosinophil % 2.5 0.3 - 6.2 %    Basophil % 0.3 0.0 - 1.5 %    Immature Grans % 0.3 0.0 - 0.5 %    Neutrophils, Absolute 5.60 1.70 - 7.00 10*3/mm3    Lymphocytes, Absolute 2.50 0.70 - 3.10 10*3/mm3    Monocytes, Absolute 0.84 0.10 - 0.90 10*3/mm3    Eosinophils, Absolute 0.23 0.00 - 0.40 10*3/mm3    Basophils, Absolute 0.03 0.00 - 0.20 10*3/mm3    Immature Grans, Absolute 0.03 0.00 - 0.05 10*3/mm3    nRBC 0.0 0.0 - 0.2 /100 WBC   Urinalysis With Culture If Indicated - Urine, Clean Catch    Collection Time: 04/18/25 12:06 PM    Specimen: Urine, Clean Catch   Result Value Ref Range    Color, UA Yellow Yellow, Straw    Appearance, UA Clear Clear    pH, UA <=5.0 5.0 - 8.0    Specific Gravity, UA 1.006 1.005 - 1.030    Glucose, UA Negative Negative    Ketones, UA Negative Negative    Bilirubin, UA Negative Negative    Blood, UA Negative Negative    Protein, UA Negative Negative    Leuk Esterase, UA Negative Negative    Nitrite, UA Negative Negative     Urobilinogen, UA 0.2 E.U./dL 0.2 - 1.0 E.U./dL       Diagnoses and all orders for this visit:    1. Cellulitis of left lower extremity (Primary)  -     cephalexin (KEFLEX) 500 MG capsule; Take 1 capsule by mouth 4 (Four) Times a Day for 7 days.  Dispense: 28 capsule; Refill: 0  -     triamcinolone (KENALOG) 0.1 % cream; Apply 1 Application topically to the appropriate area as directed 2 (Two) Times a Day.  Dispense: 45 g; Refill: 0          FOLLOW-UP  As discussed during visit with PCP/[facility] if no improvement or Urgent Care/Emergency Department if worsening of symptoms    Patient verbalizes understanding of medication dosage, comfort measures, instructions for treatment and follow-up.    SAMI Evans  07/06/2025  21:11 EDT    Mode of Visit: Video  Location of patient: -HOME-  Location of provider: +HOME+  You have chosen to receive care through a telehealth visit.  The patient has signed the video visit consent form.  The visit included audio and video interaction. No technical issues occurred during this visit.      The use of a video visit has been reviewed with the patient and verbal informed consent has been obtained. Myself and Farhat Hein participated in this visit. The patient is located in [address] [place] [address] Dr MELENDREZ [address] [place] [address] [place] [facility].   I am located in [place], [place]. [employer] and [employer] Client were utilized. I spent 10 minutes in the patient's chart for this visit.         Note Disclaimer: At [facility], we believe that sharing information builds trust and better   relationships. You are receiving this note because you recently visited [facility]. It is possible you   will see health information before a provider has talked with you about it. This kind of information can   be easy to misunderstand. To help you fully understand what it means for your health, we urge you to   discuss this note with your provider.

## 2025-07-23 ENCOUNTER — TELEMEDICINE (OUTPATIENT)
Dept: FAMILY MEDICINE CLINIC | Facility: TELEHEALTH | Age: 45
End: 2025-07-23
Payer: MEDICARE

## 2025-07-23 DIAGNOSIS — T63.301A SPIDER BITE WOUND, ACCIDENTAL OR UNINTENTIONAL, INITIAL ENCOUNTER: Primary | ICD-10-CM

## 2025-07-23 RX ORDER — DOXYCYCLINE 100 MG/1
100 CAPSULE ORAL 2 TIMES DAILY
Qty: 14 CAPSULE | Refills: 0 | Status: SHIPPED | OUTPATIENT
Start: 2025-07-23 | End: 2025-07-30

## 2025-07-23 RX ORDER — MUPIROCIN 2 %
1 OINTMENT (GRAM) TOPICAL 3 TIMES DAILY
Qty: 22 G | Refills: 0 | Status: SHIPPED | OUTPATIENT
Start: 2025-07-23

## 2025-07-23 NOTE — PROGRESS NOTES
CHIEF COMPLAINT  Chief Complaint   Patient presents with    Insect Bite         HPI  Farhat Hein is a 45 y.o. male  presents with complaint of insect bite on the palm of left hand. He woke up this morning with two open areas on his palm. He reports no injury or blisters noted yesterday or last night.     Review of Systems   Constitutional:  Negative for chills, diaphoresis, fatigue and fever.   HENT: Negative.     Skin:  Positive for wound.   Hematological:  Negative for adenopathy.       Past Medical History:   Diagnosis Date    Allergic 1979    seasonal    Arthritis 2020    legs    Asthma 2024    Chronic obstructive pulmonary disease 2023    COPD (chronic obstructive pulmonary disease) 2011    GERD (gastroesophageal reflux disease) 2006    Hypertension 2006    Kidney stone 2010    Low back pain 2020    Obesity 1997    Pneumonia 2023    Renal insufficiency 2021    Stroke 2015    Substance abuse     Type 2 diabetes mellitus 2019    Urinary tract infection 03/15/2020       Family History   Problem Relation Age of Onset    Arthritis Mother     COPD Mother     Diabetes Mother     Hyperlipidemia Mother     Hypertension Mother     Alcohol abuse Father     Arthritis Father     Cancer Father         SKIN    Diabetes Father     Heart disease Father     Hyperlipidemia Father     Hypertension Father     Obesity Father     Arthritis Sister     Cancer Sister         BREAST    Diabetes Sister     Hyperlipidemia Sister     Heart disease Sister     Hypertension Sister     Obesity Sister     Sleep apnea Sister     Arthritis Brother     Cancer Brother         KIDNEY/BRAIN/SKIN    Hyperlipidemia Brother     Kidney disease Brother     Hypertension Brother     Alcohol abuse Brother     Mental illness Brother         schizophrenia    Cancer Sister         COLON    Diabetes Sister             Hyperlipidemia Sister     Hypertension Sister      Diabetes Brother     Heart disease Brother     Hyperlipidemia Brother     Hypertension Brother     Alcohol abuse Brother     Cancer Maternal Grandmother     Cancer Maternal Uncle        Social History     Socioeconomic History    Marital status:    Tobacco Use    Smoking status: Former     Current packs/day: 0.00     Average packs/day: 2.0 packs/day for 10.4 years (20.8 ttl pk-yrs)     Types: Cigarettes     Start date: 1998     Quit date: 2008     Years since quittin.1     Passive exposure: Past    Smokeless tobacco: Never   Vaping Use    Vaping status: Never Used   Substance and Sexual Activity    Alcohol use: Not Currently     Alcohol/week: 12.0 standard drinks of alcohol     Types: 12 Cans of beer per week     Comment: PER DAY    Drug use: Yes     Frequency: 7.0 times per week     Types: Marijuana    Sexual activity: Yes     Partners: Female     Birth control/protection: Condom         There were no vitals taken for this visit.    PHYSICAL EXAM  Physical Exam   Constitutional: He is oriented to person, place, and time. He appears well-developed and well-nourished. He does not have a sickly appearance. He does not appear ill. No distress.   HENT:   Head: Normocephalic and atraumatic.   Eyes: EOM are normal.   Neck: Neck normal appearance.  Pulmonary/Chest: Effort normal.  No respiratory distress.  Neurological: He is alert and oriented to person, place, and time.   Skin: Skin is dry.   He has 2 approximately 10 mm of superficial open areas on the left palm along the center crease. It looks like 2 blisters that ruptured. He reports this was not there, but woke up with it this morning. No surrounding erythema, but some dry and peeling skin around the blisters. No drainage.    Psychiatric: He has a normal mood and affect.           Diagnoses and all orders for this visit:    1. Insect bite of left hand, initial encounter (Primary)    Other orders  -     mupirocin (BACTROBAN) 2 % ointment; Apply  1 Application topically to the appropriate area as directed 3 (Three) Times a Day.  Dispense: 22 g; Refill: 0  -     doxycycline (VIBRAMYCIN) 100 MG capsule; Take 1 capsule by mouth 2 (Two) Times a Day for 7 days.  Dispense: 14 capsule; Refill: 0        Mode of visit: Video   Myself and Farhat Hein participated in this visit. The patient is located in [address] [place] [address] Dr MELENDREZ [address] [place] [address] [place] [facility]. I am located in [place], [place]. Mychart and Twilio were utilized.   You have chosen to receive care through a telehealth visit.     Does the patient consent to use a video/audio connection for your medical care today? Yes       Note Disclaimer: At [facility], we believe that sharing information builds trust and better   relationships. You are receiving this note because you recently visited [facility]. It is possible you   will see health information before a provider has talked with you about it. This kind of information can   be easy to misunderstand. To help you fully understand what it means for your health, we urge you to   discuss this note with your provider.    Nathalia Rinaldi, SAMI  07/23/2025  09:00 EDT

## 2025-07-23 NOTE — PATIENT INSTRUCTIONS
Follow up if you develop fever, chills, increased redness or pain at the site.   Clean with warm soapy water as needed and thoroughly dry.   Apply mupirocin ointment to area 2-3 times per day  Apply a band aid if working or potential to get hands dirty.   Do no use alcohol or peroxide to wounds.        Clinical References    Insect Bite, Adult  An insect bite can make your skin red, itchy, and swollen. Some insects can spread disease to people with a bite. However, most insect bites do not lead to disease, and most are not serious.  What are the causes?  Insects may bite for many reasons, including:  Hunger.  To defend themselves.  Insects that bite include:  Spiders.  Mosquitoes.  Flies.  Ticks and fleas.  Ants.  Kissing bugs.  Chiggers.  What are the signs or symptoms?  Symptoms often last for 2-4 days. However, itching can last up to 10 days. Symptoms include:  Itching or pain in the bite area.  Redness and swelling in the bite area.  An open wound.  In rare cases, a person may have a very bad allergic reaction (anaphylactic reaction) to a bite. Symptoms of an anaphylactic reaction may include:  Feeling warm in the face (flushed). Your face may turn red.  Itchy, red, swollen areas of skin (hives).  Swelling of the eyes, lips, face, mouth, tongue, or throat.  Trouble with breathing, talking, or swallowing.  High-pitched whistling sounds, most often when breathing out (wheezing).  Feeling dizzy or light-headed.  Fainting.  Pain or cramps in your belly (abdomen).  Vomiting.  Watery poop (diarrhea).  How is this treated?  Most insect bites are not serious. Symptoms often go away on their own. When treatment is advised, it may include:  Putting ice on the bite area.  Putting a cream or lotion, like calamine lotion, on the bite area. This helps with itching.  Using medicines called antihistamines.  You may also need:  A tetanus shot if you are not up to date.  An antibiotic cream or medicine. This treatment is needed  if the bite area gets infected.  Follow these instructions at home:  Bite area care    Do not scratch the bite area. It may help to cover the bite area with a bandage or close-fitting clothing.  Keep the bite area clean and dry.  Check the bite area every day for signs of infection. Check for:  More redness, swelling, or pain.  Fluid or blood.  Warmth.  Pus or a bad smell.  Wash your hands often.  Managing pain, itching, and swelling    You may put any of these on the bite area as told by your doctor:  A paste made of baking soda and water.  Cortisone cream.  Calamine lotion.  If told, put ice on the bite area. To do this:  Put ice in a plastic bag.  Place a towel between your skin and the bag.  Leave the ice on for 20 minutes, 2-3 times a day.  If your skin turns bright red, take off the ice right away to prevent skin damage. The risk of skin damage is higher if you cannot feel pain, heat, or cold.  General instructions  Apply or take over-the-counter and prescription medicines only as told by your doctor.  If you were prescribed antibiotics, take or apply them as told by your doctor. Do not stop using them even if you start to feel better.  How is this prevented?  To help you have a lower risk of insect bites:  When you are outside, wear clothes that cover your arms and legs.  Use insect repellent. The best insect repellents contain one of these:  DEET.  Picaridin.  Oil of lemon eucalyptus (OLE).  HN5016.  Consider spraying your clothing with a pesticide called permethrin. Permethrin helps prevent insect bites. It works for several weeks and for up to 5-6 clothing washes. Do not apply permethrin directly to the skin.  If your home windows do not have screens, think about putting some in.  If you will be sleeping in an area where there are mosquitoes, consider covering your sleeping area with a mosquito net.  Contact a doctor if:  You have redness, swelling, or pain in the bite area.  You have fluid or blood coming  from the bite area.  The bite area feels warm to the touch.  You have pus or a bad smell coming from the bite area.  You have a fever.  Get help right away if:  You have joint pain.  You have a rash.  You feel weak or more tired than you normally do.  You have neck pain or a headache.  You have signs of an anaphylactic reaction. Signs may include:  Swelling of your eyes, lips, face, mouth, tongue, or throat.  Feeling warm in the face.  Itchy, red, swollen areas of skin.  Trouble with breathing, talking, or swallowing.  Wheezing.  Feeling dizzy or light-headed.  Fainting.  Pain or cramps in your belly.  Vomiting or watery poop.  These symptoms may be an emergency. Get help right away. Call 911.  Do not wait to see if symptoms will go away.  Do not drive yourself to the hospital.  Summary  An insect bite can make your skin red, itchy, and swollen.  Treatment is usually not needed. Symptoms often go away on their own.  Do not scratch the bite area. Keep it clean and dry.  Use insect repellent to help prevent insect bites.  Contact a doctor if you have signs of infection.  This information is not intended to replace advice given to you by your health care provider. Make sure you discuss any questions you have with your health care provider.  Document Revised: 03/14/2023 Document Reviewed: 03/14/2023  [employer] Patient Education © 2024 [employer]  Blisters, Adult    A blister is a raised bubble of skin filled with liquid. It can form on skin that rubs or presses again and again on another surface (friction blister).   Blisters can happen on any part of the body. In most cases, they form on the hands or the feet. Long-term pressure or friction on that same spot of skin can cause the skin to harden. This hardened skin is called a callus. Blisters can also be caused by other types of injuries and infection.  What are the causes?  Blisters can be caused by friction. They can also be caused by:  An injury, such as a burn,  frostbite, or pinched skin.  An allergic reaction, such as to poison ivy.  An infection or disease, such as a cold sore or skin infection.  Chemicals, such as soaps or cleaning products.  Pressure on the skin over a bony spot or under a medical device like a cast.  Friction blisters often happen where there is a lot of heat and moisture. They may result from:  Sports.  Activities that are done over and over.  Using tools and doing other activities without wearing gloves.  Shoes that are too tight or too loose.  What are the signs or symptoms?  Blisters are bubbles of skin. They may hurt or feel itchy. They can be filled with clear liquid, blood, or pus. The type of liquid in the blister depends on what caused the blister. Before a blister forms, your skin may:  Turn red.  Feel warm.  Itch.  Be painful to the touch.  How is this diagnosed?  A blister is diagnosed with a physical exam.  How is this treated?  You may need to protect the spot where the blister has formed until your skin has healed. This may mean:  Using a bandage (dressing) to cover your blister.  Putting extra padding around and over your blister so that it does not rub on anything.  Applying antibiotic ointment.  Most blisters will heal on their own. They should break open, dry up, and go away within 1-2 weeks. Blisters that are very painful may be drained by your health care provider.  Follow these instructions at home:  Medicines  Take or apply over-the-counter and prescription medicines only as told by your provider.  If you were prescribed antibiotics, take or apply them as told by your provider. Do not stop using the antibiotic even if you start to feel better.  Skin care  Do not pop your blister. This can cause infection.  Keep your blister clean and dry. This helps to prevent infection.  Before you swim or use a hot tub, cover your blister with a waterproof dressing.  Protect the spot where the blister has formed as told by your  provider.  Follow instructions from your provider about how to take care of your blister. Make sure you:  Wash your hands with soap and water for at least 20 seconds before and after you change your dressing. If soap and water are not available, use hand .  Change your dressing as told by your provider.  Infection signs  Check your blister every day for signs of infection. Check for:  More redness, swelling, or pain.  More fluid or blood.  Warmth.  Pus or a bad smell.  General instructions  If you have a blister on your foot or toe, wear other shoes until it heals.  Avoid the activity that caused the blister until it heals.  How is this prevented?  You can take certain steps to help prevent friction blisters. Make sure you:  Wear comfortable shoes that fit well.  Always wear socks with shoes.  Wear extra socks or use tape, dressings, or pads over spots that are prone to blisters. You may also put petroleum jelly under the dressings.  Wear protective gear, such as gloves, when you take part in sports or activities that can cause blisters.  Wear loose-fitting clothes that repel moisture when you take part in sports or other activities.  Use powders as needed to keep your feet dry.  Where to find more information  [employer] Association: aad.org  Contact a health care provider if:  You have signs of an infection.  You have a fever or chills.  Your blister gets better and then gets worse.  This information is not intended to replace advice given to you by your health care provider. Make sure you discuss any questions you have with your health care provider.  Document Revised: 09/25/2023 Document Reviewed: 09/25/2023  [employer] Patient Education © 2024 [employer]

## 2025-08-06 ENCOUNTER — TELEMEDICINE (OUTPATIENT)
Dept: FAMILY MEDICINE CLINIC | Facility: TELEHEALTH | Age: 45
End: 2025-08-06
Payer: MEDICARE

## 2025-08-06 VITALS — TEMPERATURE: 99.8 F

## 2025-08-06 DIAGNOSIS — J22 LOWER RESPIRATORY INFECTION: ICD-10-CM

## 2025-08-06 DIAGNOSIS — Z87.09 HISTORY OF COPD: Primary | ICD-10-CM

## 2025-08-06 PROCEDURE — 99213 OFFICE O/P EST LOW 20 MIN: CPT | Performed by: NURSE PRACTITIONER

## 2025-08-06 RX ORDER — PREDNISONE 20 MG/1
20 TABLET ORAL 2 TIMES DAILY
Qty: 10 TABLET | Refills: 0 | Status: SHIPPED | OUTPATIENT
Start: 2025-08-06 | End: 2025-08-11

## 2025-08-06 RX ORDER — ONDANSETRON 4 MG/1
4 TABLET, ORALLY DISINTEGRATING ORAL EVERY 8 HOURS PRN
Qty: 12 TABLET | Refills: 0 | Status: SHIPPED | OUTPATIENT
Start: 2025-08-06 | End: 2025-08-10

## 2025-08-06 RX ORDER — AZITHROMYCIN 250 MG/1
TABLET, FILM COATED ORAL
Qty: 6 TABLET | Refills: 0 | Status: SHIPPED | OUTPATIENT
Start: 2025-08-06

## 2025-08-07 ENCOUNTER — TELEMEDICINE (OUTPATIENT)
Dept: FAMILY MEDICINE CLINIC | Facility: TELEHEALTH | Age: 45
End: 2025-08-07
Payer: MEDICARE

## 2025-08-07 DIAGNOSIS — J44.1 COPD WITH ACUTE EXACERBATION: Primary | ICD-10-CM

## 2025-08-07 RX ORDER — ALBUTEROL SULFATE 90 UG/1
2 INHALANT RESPIRATORY (INHALATION) EVERY 4 HOURS PRN
Qty: 18 G | Refills: 0 | Status: SHIPPED | OUTPATIENT
Start: 2025-08-07

## 2025-08-07 RX ORDER — BENZONATATE 100 MG/1
100 CAPSULE ORAL 3 TIMES DAILY PRN
Qty: 21 CAPSULE | Refills: 0 | Status: SHIPPED | OUTPATIENT
Start: 2025-08-07